# Patient Record
Sex: MALE | Race: WHITE | NOT HISPANIC OR LATINO | Employment: OTHER | URBAN - METROPOLITAN AREA
[De-identification: names, ages, dates, MRNs, and addresses within clinical notes are randomized per-mention and may not be internally consistent; named-entity substitution may affect disease eponyms.]

---

## 2018-01-12 ENCOUNTER — OFFICE VISIT CONVERTED (OUTPATIENT)
Dept: SURGERY | Facility: CLINIC | Age: 78
End: 2018-01-12
Attending: UROLOGY

## 2018-03-29 ENCOUNTER — OFFICE VISIT CONVERTED (OUTPATIENT)
Dept: CARDIOLOGY | Facility: CLINIC | Age: 78
End: 2018-03-29
Attending: INTERNAL MEDICINE

## 2018-10-30 ENCOUNTER — OFFICE VISIT CONVERTED (OUTPATIENT)
Dept: CARDIOLOGY | Facility: CLINIC | Age: 78
End: 2018-10-30
Attending: INTERNAL MEDICINE

## 2019-02-26 ENCOUNTER — OFFICE VISIT CONVERTED (OUTPATIENT)
Dept: SURGERY | Facility: CLINIC | Age: 79
End: 2019-02-26
Attending: UROLOGY

## 2019-06-07 ENCOUNTER — OFFICE VISIT CONVERTED (OUTPATIENT)
Dept: CARDIOLOGY | Facility: CLINIC | Age: 79
End: 2019-06-07
Attending: INTERNAL MEDICINE

## 2019-12-19 ENCOUNTER — CONVERSION ENCOUNTER (OUTPATIENT)
Dept: CARDIOLOGY | Facility: CLINIC | Age: 79
End: 2019-12-19

## 2019-12-19 ENCOUNTER — OFFICE VISIT CONVERTED (OUTPATIENT)
Dept: CARDIOLOGY | Facility: CLINIC | Age: 79
End: 2019-12-19
Attending: INTERNAL MEDICINE

## 2020-03-13 ENCOUNTER — CONVERSION ENCOUNTER (OUTPATIENT)
Dept: SURGERY | Facility: CLINIC | Age: 80
End: 2020-03-13

## 2020-03-13 ENCOUNTER — OFFICE VISIT CONVERTED (OUTPATIENT)
Dept: UROLOGY | Facility: CLINIC | Age: 80
End: 2020-03-13
Attending: UROLOGY

## 2020-07-23 ENCOUNTER — CONVERSION ENCOUNTER (OUTPATIENT)
Dept: CARDIOLOGY | Facility: CLINIC | Age: 80
End: 2020-07-23

## 2020-07-23 ENCOUNTER — OFFICE VISIT CONVERTED (OUTPATIENT)
Dept: CARDIOLOGY | Facility: CLINIC | Age: 80
End: 2020-07-23
Attending: INTERNAL MEDICINE

## 2020-07-31 ENCOUNTER — OFFICE VISIT CONVERTED (OUTPATIENT)
Dept: CARDIOLOGY | Facility: CLINIC | Age: 80
End: 2020-07-31
Attending: INTERNAL MEDICINE

## 2020-07-31 ENCOUNTER — HOSPITAL ENCOUNTER (OUTPATIENT)
Dept: CARDIOLOGY | Facility: HOSPITAL | Age: 80
Discharge: HOME | End: 2020-07-31
Attending: INTERNAL MEDICINE

## 2020-09-09 ENCOUNTER — OFFICE VISIT CONVERTED (OUTPATIENT)
Dept: UROLOGY | Facility: CLINIC | Age: 80
End: 2020-09-09
Attending: UROLOGY

## 2021-03-04 ENCOUNTER — CONVERSION ENCOUNTER (OUTPATIENT)
Dept: CARDIOLOGY | Facility: CLINIC | Age: 81
End: 2021-03-04

## 2021-03-04 ENCOUNTER — OFFICE VISIT CONVERTED (OUTPATIENT)
Dept: CARDIOLOGY | Facility: CLINIC | Age: 81
End: 2021-03-04
Attending: INTERNAL MEDICINE

## 2021-03-16 ENCOUNTER — CONVERSION ENCOUNTER (OUTPATIENT)
Dept: SURGERY | Facility: CLINIC | Age: 81
End: 2021-03-16

## 2021-03-16 ENCOUNTER — OFFICE VISIT CONVERTED (OUTPATIENT)
Dept: UROLOGY | Facility: CLINIC | Age: 81
End: 2021-03-16
Attending: UROLOGY

## 2021-03-16 ENCOUNTER — HOSPITAL ENCOUNTER (OUTPATIENT)
Dept: LAB | Facility: HOSPITAL | Age: 81
Discharge: HOME | End: 2021-03-16
Attending: INTERNAL MEDICINE

## 2021-03-16 LAB
BILIRUB UR QL STRIP: NEGATIVE
BNP SERPL-MCNC: 1281 PG/ML (ref 0–1800)
COLOR UR: YELLOW
CONV CLARITY OF URINE: CLEAR
CONV PROTEIN IN URINE BY AUTOMATED TEST STRIP: NEGATIVE
CONV UROBILINOGEN IN URINE BY AUTOMATED TEST STRIP: NORMAL
GLUCOSE UR QL: NEGATIVE
HGB UR QL STRIP: NEGATIVE
KETONES UR QL STRIP: NEGATIVE
LEUKOCYTE ESTERASE UR QL STRIP: NEGATIVE
NITRITE UR QL STRIP: NEGATIVE
PH UR STRIP.AUTO: 6 [PH]
SP GR UR: 1.03

## 2021-05-13 NOTE — PROGRESS NOTES
Progress Note      Patient Name: Justyn Galo   Patient ID: [id number]   Sex: Male   YOB: 1940    Primary Care Provider: Martin Monahan MD   Referring Provider: Saqib Ortega MD    Visit Date: July 31, 2020    Provider: Saqib Ortega MD   Location: [facility]   Location Address: [address] [place] [address], [facility]   [place]  [id number]   Location Phone: [phone]          Chief Complaint  · Right lower leg edema and pain.       History Of Present Illness  REFERRING CARE PROVIDER: Saqib Ortega MD   Justyn Galo is a 80 year old /White male with a history of paroxysmal atrial fibrillation, hypothyroidism, CVA, and sleep apnea who, after being seen last time, developed some pain in his right foot along with edema and swelling. He was seen in the emergency room and treated for cellulitis. The patient, prior to this, had not been having any ambulatory discomfort in the leg and started rather abruptly about a week ago. He denies fever, or chills.   PAST MEDICAL HISTORY: CVA; Paroxysmal atrial fibrillation; Hypothyroidism.   FAMILY HISTORY: Positive for hypertension and heart disease. Negative for diabetes mellitus.   PSYCHOSOCIAL HISTORY: Previously smoked, but quit. Rarely consumes alcohol. Admits mood changes and depression.   CURRENT MEDICATIONS: Metoprolol 50 mg b.i.d.; amiodarone 200 mg b.i.d.; Xarelto which was originally listed at 10 mg but in talking to them believe, he states, he was actually taking 20 mg daily; escitalopram 10 mg daily; tamsulosin 0.4 mg b.i.d.; levothyroxine 50 mcg daily; finasteride 5 mg daily; Centrum Silver daily; hydrocodone-APAP 5-325 mg 1-2 q. 6 h. p.r.n.; cephalexin 500 mg 1 capsules q. 6 h.; trimethoprim sulfamethoxazole 1 tablet q. 12 h. x10 days.       Review of Systems  · Cardiovascular  o Admits  o : palpitations (fast, fluttering, or skipping beats), swelling (feet, ankles, hands), shortness of breath while walking or lying  "flat  o Denies  o : chest pain or angina pectoris   · Respiratory  o Denies  o : chronic or frequent cough, asthma or wheezing      Vitals  Date Time BP Position Site L\R Cuff Size HR RR TEMP (F) WT  HT  BMI kg/m2 BSA m2 O2 Sat        07/31/2020 10:55 /68 Sitting    77 - R   189lbs 4oz 6'  2\" 24.3 2.12           Physical Examination  · Constitutional  o Appearance  o : Awake, alert, in no acute distress.   · Eyes  o Conjunctivae  o : Normal.  · Ears, Nose, Mouth and Throat  o Oral Cavity  o :   § Oral Mucosa  § : Normal.  · Neck  o Inspection/Palpation  o : No JVD. Good carotid upstroke. No thyromegaly.  · Respiratory  o Respiratory  o : Good respiratory effort. Clear to percussion and auscultation.  · Cardiovascular  o Heart  o :   § Auscultation of Heart  § : Irregularly irregular.  o Peripheral Vascular System  o :   § Extremities  § : Patient's right foot is erythematous with a somewhat clearly demarcated area over mid way up his foot. There are no areas of obvious skin breakdown. Not able to palpate his dorsalis pedis or posterior tibialis well on the right. On the left side, they were mildly able to be palpated, +1 bilaterally.   · Gastrointestinal  o Abdominal Examination  o : Soft. No tenderness or masses felt. No hepatosplenomegaly. Abdominal aorta is not palpable.  · Imaging  o Imaging  o : Foot X-ray from the 24th shows soft tissue changes around the 1st metatarsal phalangeal joint; no acute fracture; possibly inflammatory process.  · Labs  o Labs  o : WBC 7.34.          Assessment     ASSESSMENT & PLAN:    Right foot swelling and erythema.  Patient has symptomatically improved.  Pain is resolved.  He states swelling is improved, as well, although this is my first time visualizing the area.  Patient still has a few days left on his antibiotics, although there is no obvious area of skin breakdown from a cellulitis standpoint.  Given his clinical response, this does seem the possible underlying " issue.  Patient also does have mmbrnyyjn-jf-gurivme pulses, though, but no ongoing pain.  We will get a lower extremity ultrasound of his legs to evaluate for peripheral vascular disease, as well.  In the meantime, instructed him to make sure that he is taking 20 mg of Xarelto when he goes home and that he is taking it with a meal.        Saqib Ortega MD  JH:vm             Electronically Signed by: Candace Felix-, Other -Author on August 4, 2020 09:05:46 AM  Electronically Co-signed by: Saqib Ortega MD -Reviewer on August 10, 2020 02:10:59 PM

## 2021-05-13 NOTE — PROGRESS NOTES
Progress Note      Patient Name: Justyn Galo   Patient ID: [id number]   Sex: Male   YOB: 1940    Primary Care Provider: Martin Monahan MD    Visit Date: September 9, 2020    Provider: Lukas Bran MD   Location: [facility]   Location Address: [address] [place] [address]  [id number]   Location Phone: [phone]          Chief Complaint  · pt here for urologic issues      History Of Present Illness     78 yo male presents in follow up. He is seen for LUTS and incontience     Patient was started on finasteride and increased Flomax 0.8 mg at his last visit.    currently on Flomax 0.8 mg daily and  Finasteride 5 mg daily.  Has been on this greater than 1 year.  Not sure if this medication is helping or not.  ok stream.  intermittency of stream. Nocturia X  3-2 .  frequency.  With a slow stream and some hesitancy with starting and stopping.    Patient is been having a lot of trouble with falling, he fell 3 times last week.    No Gross hematuria.    Patient wearing 1 pad daily.  Patient having some urge incontinence at times.    Not sure if Flomax is causing any of his dizziness or not.    PVR     8/20  191   3/20  100    No history of kidney stone.    No urologic family history,   Has never had any urologic surgery.    h/o CVA.  Patient does not smoke.  On Xarelto for atrial fibrillation.                 Past Medical History  Atrial Fibrillation; Hypertension; Irregular Heart Beat; Stroke; Urinary frequency; Urinary incontinence         Past Surgical History  Cataract surgery; Leg Surgery         Medication List  escitalopram oxalate 10 mg oral tablet; finasteride 5 mg oral tablet; Flomax 0.4 mg oral capsule; levothyroxine 25 mcg oral tablet; metoprolol succinate 25 mg oral tablet extended release 24 hr; Toprol XL 25 mg oral tablet extended release 24 hr; Xarelto 20 mg oral tablet         Allergy List  Claritin       Allergies Reconciled  [facility]  "History  Cerebrovascular disease; Heart Attack (MI)         Social History  Tobacco (Former)         Review of Systems  · Constitutional  o Denies  o : chills, fever  · Gastrointestinal  o Denies  o : nausea, vomiting      Vitals  Date Time BP Position Site L\R Cuff Size HR RR TEMP (F) WT  HT  BMI kg/m2 BSA m2 O2 Sat HC       09/09/2020 02:47 PM       17  189lbs 0oz 6'  2\" 24.27 2.12           Physical Examination  · Constitutional  o Appearance  o : Well-appearing, well-developed, in no acute distress  · Respiratory  o Respiratory Effort  o : Unlabored breathing  · Cardiovascular  o Heart  o :   § Auscultation of Heart  § : Regular rate and rhythm, no murmurs  · Neurologic  o Mental Status Examination  o :   § Orientation  § : Grossly oriented to person, place and time, judgment and insight intact, normal mood and affect          Results  · In-Office Procedures  o Surgical procedure  § IOP - Bladder Scan/Residual Urine (00775)   § Specimen vol Ur: 191       Assessment  · Lower urinary tract symptoms     788.99/R39.9  · Benign prostatic hyperplasia with weak urinary stream       Benign prostatic hyperplasia with lower urinary tract symptoms     600.01/N40.1  Poor urinary stream     600.01/R39.12    Problems Reconciled  Plan  · Medications  o Medications have been Reconciled  o Transition of Care or Provider Policy  · Instructions  o Electronically Identified Patient Education Materials Provided Electronically       BPH    Patient having more trouble with falls recently.  After discussion I am not sure if it is from Flomax or not but we will take him back down to Flomax 0.4 mg daily.    Continue also finasteride 5 mg daily    Did discuss TURP risk and benefits today.  Because of patient's age and his health at this time we will hold off on any procedures.  He will let me know if he gets worse in the coming months      Follow-up in 6 months with [facility] before    Greater than 15 minutes was used in counseling and " coordination of care, with greater than 51% of this in face-to-face counseling             Electronically Signed by: Lukas Bran MD -Author on September 9, 2020 04:51:44 PM

## 2021-05-13 NOTE — PROGRESS NOTES
Progress Note      Patient Name: Justyn Galo   Patient ID: [id number]   Sex: Male   YOB: 1940    Primary Care Provider: Martin Monahan MD   Referring Provider: Saqib Ortega MD    Visit Date: July 23, 2020    Provider: Saqib Ortega MD   Location: [facility]   Location Address: [address] [place] [address], [facility]   [place]  [id number]   Location Phone: [phone]          Chief Complaint  · Tachycardia   · Not feeling well      History Of Present Illness  REFERRING CARE PROVIDER: Martin Monahan MD   Justyn Galo is a 80 year old /White gentleman with a previous history of paroxysmal atrial fibrillation, hypothyroidism, CVA and sleep apnea, who has had issues with elevated heart rate recently, dizziness especially with position changes and more episodes of falling. He reports increased shortness of breath and just not feeling well in general. He denies any chest pain problems.   PAST MEDICAL HISTORY: Paroxysmal atrial fibrillation; previous cerebrovascular accident; hypothyroidism.   FAMILY HISTORY: Positive for hypertension and heart disease. Negative for diabetes.   PSYCHOSOCIAL HISTORY: Positive for mood changes and depression. He rarely drinks alcohol. He previously used tobacco but quit.   CURRENT MEDICATIONS: include Metoprolol 50 mg b.i.d.; Xarelto 20 mg daily; Escitalopram; Tamsulosin; Synthroid 50 mcg daily; Finasteride 5 mg daily; Centrum Silver. The dosage and frequency of the medications were reviewed with the patient.       Review of Systems  · Cardiovascular  o Admits  o : palpitations (fast, fluttering, or skipping beats), swelling (feet, ankles, hands), shortness of breath while walking or lying flat  o Denies  o : chest pain or angina pectoris   · Respiratory  o Denies  o : chronic or frequent cough, asthma or wheezing      Vitals  Date Time BP Position Site L\R Cuff Size HR RR TEMP (F) WT  HT  BMI kg/m2 BSA m2 O2 Sat HC       07/23/2020 01:20 PM  "144/100 Sitting    136 - R   195lbs 16oz 6'  2\" 25.16 2.15     07/23/2020 01:20 /96 Sitting    120 - R                 Physical Examination  · Constitutional  o Appearance  o : Awake, alert, in no acute distress.   · Eyes  o Conjunctivae  o : Normal.  · Ears, Nose, Mouth and Throat  o Oral Cavity  o :   § Oral Mucosa  § : Normal.  · Neck  o Inspection/Palpation  o : No JVD. Good carotid upstroke. No thyromegaly.  · Respiratory  o Respiratory  o : Good respiratory effort. Clear to percussion and auscultation.  · Cardiovascular  o Heart  o :   § Auscultation of Heart  § : S1, S2 normal. Irregularly irregular without murmurs, gallops, or rubs.  o Peripheral Vascular System  o :   § Extremities  § : Good femoral and pedal pulses. +1 lower-extremity edema.  · Gastrointestinal  o Abdominal Examination  o : Soft. No tenderness or masses felt. No hepatosplenomegaly. Abdominal aorta is not palpable.          Assessment     ASSESSMENT AND PLAN:    1.  Atrial fibrillation, persistent now in nature and elevated rate:  Discussed with him the possibility of a        cardioversion.  He wished to try chemically converting first.  Will start him on Amlodipine 200 mg b.i.d. and       have him follow back up next week to see if he has returned back to normal sinus rhythm or not.  Patient is       on Xarelto for CVA prevention.  2.  CVA.    MD Sandra Ji/kendrick         This note was transcribed by Kim Maxwell.  kendrick/sandra   The above service was transcribed by Kim Maxwell, and I attest to the accuracy of the note.  SANDRA.               Electronically Signed by: Kim Maxwell-, -Author on July 27, 2020 10:16:30 AM  Electronically Co-signed by: Saqib Ortega MD -Reviewer on July 27, 2020 10:21:03 AM  "

## 2021-05-14 VITALS — BODY MASS INDEX: 26.37 KG/M2 | RESPIRATION RATE: 13 BRPM | WEIGHT: 205.5 LBS | HEIGHT: 74 IN

## 2021-05-14 VITALS — BODY MASS INDEX: 24.26 KG/M2 | WEIGHT: 189 LBS | HEIGHT: 74 IN | RESPIRATION RATE: 17 BRPM

## 2021-05-14 VITALS
SYSTOLIC BLOOD PRESSURE: 106 MMHG | HEIGHT: 74 IN | WEIGHT: 208 LBS | HEART RATE: 98 BPM | BODY MASS INDEX: 26.69 KG/M2 | DIASTOLIC BLOOD PRESSURE: 70 MMHG

## 2021-05-14 NOTE — PROGRESS NOTES
Progress Note      Patient Name: Justyn Galo   Patient ID: [id number]   Sex: Male   YOB: 1940    Primary Care Provider: Martin Monahan MD    Visit Date: March 16, 2021    Provider: Lukas Bran MD   Location: [facility]   Location Address: [address] [place] [address]  [id number]   Location Phone: [phone]          Chief Complaint  · urological issues      History Of Present Illness     79 yo male presents in follow up. He is seen for LUTS and incontinence     Patient is being worked up by cardiology for shortness of air currently.     on finasteride and Flomax 0.4 mg at his last visit.  Was on 0.8 but had to drop back down as we are little worried this is causing him to have some falls.    Patient can have to wait for initiation of stream several minutes sometimes.  No Straining.  Ok stream.  intermittency of stream. Nocturia X  3-2 .  frequency.   slow stream and some hesitancy with starting and stopping. 1 pad a day.  About the same.    Incontinence is insensate.    Had trouble with falls back last year has been doing better.    No Gross hematuria/UTI    PVR     3/21  108  8/20  191   3/20  100    No history of kidney stone.    No urologic family history,   Has never had any urologic surgery.    No CAD. h/o CVA.  Patient does not smoke.  On Xarelto for atrial fibrillation.                 Past Medical History  Atrial Fibrillation; Hypertension; Irregular Heart Beat; Stroke; Urinary Frequency; Urinary incontinence         Past Surgical History  Cataract surgery; Leg Surgery         Medication List  escitalopram oxalate 10 mg oral tablet; finasteride 5 mg oral tablet; Flomax 0.4 mg oral capsule; levothyroxine 25 mcg oral tablet; metoprolol succinate 25 mg oral tablet extended release 24 hr; Toprol XL 25 mg oral tablet extended release 24 hr; Xarelto 20 mg oral tablet         Allergy List  Claritin       Allergies Reconciled  Family Medical History  Cerebrovascular  "disease; Heart Attack (MI)         Social History  Alcohol (Current some day); Tobacco (Former)         Review of Systems  · Constitutional  o Denies  o : fatigue, night sweats  · Eyes  o Denies  o : double vision, blurred vision  · HENT  o Denies  o : vertigo, recent head injury  · Breasts  o Denies  o : abnormal changes in breast size, additional breast symptoms except as noted in the HPI  · Cardiovascular  o Denies  o : chest pain, irregular heart beats  · Respiratory  o Denies  o : shortness of breath, productive cough  · Gastrointestinal  o Denies  o : nausea, vomiting  · Genitourinary  o Admits  o : urgency, nocturia, incontinence, difficulty voiding, decreased stream  o Denies  o : dysuria, urinary retention  · Integument  o Denies  o : hair growth change, new skin lesions  · Neurologic  o Denies  o : altered mental status, seizures  · Musculoskeletal  o Denies  o : joint swelling, limitation of motion  · Endocrine  o Denies  o : cold intolerance, heat intolerance  · Heme-Lymph  o Denies  o : petechiae, lymph node enlargement or tenderness  · Allergic-Immunologic  o Denies  o : frequent illnesses      Vitals  Date Time BP Position Site L\R Cuff Size HR RR TEMP (F) WT  HT  BMI kg/m2 BSA m2 O2 Sat FR L/min FiO2        03/16/2021 10:54 AM       13  205lbs 8oz 6'  2\" 26.38 2.21             Physical Examination  · Constitutional  o Appearance  o : Well-appearing, well-developed, in no acute distress  · Respiratory  o Respiratory Effort  o : Unlabored breathing  · Neurologic  o Mental Status Examination  o :   § Orientation  § : Grossly oriented to person, place and time, judgment and insight intact, normal mood and affect          Results  · In-Office Procedures  o Surgical procedure  § IOP - Bladder Scan/Residual Urine (78301)   § Specimen vol Ur: 108   o Lab procedure  § Automated Dipstick Urinalysis (Surg Spec) WITHOUT Micro HMH (81782)   § Color Ur: Yellow   § Clarity Ur: Clear   § Glucose Ur Ql Strip: " Negative   § Bilirub Ur Ql Strip: Negative   § Ketones Ur Ql Strip: Negative   § Sp Gr Ur Qn: 1.030   § Hgb Ur Ql Strip: Negative   § pH Ur-LsCnc: 6.0   § Prot Ur Ql Strip: Negative   § Urobilinogen Ur Strip-mCnc: 0.2 E.U./dL   § Nitrite Ur Ql Strip: Negative   § WBC Est Ur Ql Strip: Negative       Assessment  · Lower urinary tract symptoms     788.99/R39.9  · Benign prostatic hyperplasia with weak urinary stream       Benign prostatic hyperplasia with lower urinary tract symptoms     600.01/N40.1  Poor urinary stream     600.01/R39.12      Plan  · Medications  o Medications have been Reconciled  o Transition of Care or Provider Policy  · Instructions  o Electronically Identified Patient Education Materials Provided Electronically       BPH    Cont Flomax 0.4 and finasteride 5 daily.  Refilled today.    Patient having trouble with shortness of air and other issues with his medical comorbidities.  I did discuss at this time because he has several things going on I think staying on medication is most reasonable option unless he starts getting worse for BPH symptoms we will continue meds.    Follow-up in 1 year with [facility]                 Electronically Signed by: Lukas Bran MD -Author on March 16, 2021 11:27:08 AM

## 2021-05-14 NOTE — PROGRESS NOTES
"   Progress Note      Patient Name: Justyn Galo   Patient ID: [id number]   Sex: Male   YOB: 1940    Primary Care Provider: Martin Monahan MD    Visit Date: March 4, 2021    Provider: Saqib Ortega MD   Location: [facility]   Location Address: [address] [place] [address], [facility]   [place], [place]  [id number]   Location Phone: [phone]          Chief Complaint     Atrial fibrillation.       History Of Present Illness  REFERRING CARE PROVIDER: REFERRING CARE PROVIDER NAME   Justyn Galo is a 80 year old /White male with paroxysmal atrial fibrillation, hyperthyroidism, CVA, and sleep apnea who has been having some dizziness and fall spells. The dizziness seems to be somewhat positional and worsening with head turns. The patient does report some PND symptoms as well and shortness of breath at night.   PAST MEDICAL HISTORY: CVA; Paroxysmal atrial fibrillation; Hypothyroidism.   PSYCHOSOCIAL HISTORY: Rarely uses alcohol. Previous use of tobacco, but quit.   CURRENT MEDICATIONS: Medication list was reviewed and is as documented.      ALLERGIES: No known drug allergies.       Review of Systems  · Cardiovascular  o Admits  o : swelling (feet, ankles, hands), shortness of breath while walking or lying flat  o Denies  o : palpitations (fast, fluttering, or skipping beats), chest pain or angina pectoris   · Respiratory  o Denies  o : chronic or frequent cough      Vitals  Date Time BP Position Site L\R Cuff Size HR RR TEMP (F) WT  HT  BMI kg/m2 BSA m2 O2 Sat FR L/min FiO2 HC       03/04/2021 01:05 /70 Sitting    98 - R   208lbs 0oz 6'  2\" 26.71 2.22             Physical Examination  · Constitutional  o Appearance  o : Awake, alert, in no acute distress.   · Eyes  o Conjunctivae  o : Normal.  · Ears, Nose, Mouth and Throat  o Oral Cavity  o :   § Oral Mucosa  § : Normal.  · Neck  o Inspection/Palpation  o : No JVD. Good carotid upstroke. No thyromegaly.  · Respiratory  o Respiratory  o : Good " respiratory effort. Clear to percussion and auscultation.  · Cardiovascular  o Heart  o :   § Auscultation of Heart  § : S1, S2 normal. Regular rate and rhythm without murmurs, gallops, or rubs.  o Peripheral Vascular System  o :   § Extremities  § : Good femoral and pedal pulses. Lower extremity edema on the left plus 1, trace on the right.  · Gastrointestinal  o Abdominal Examination  o : Soft. No tenderness or masses felt. No hepatosplenomegaly. Abdominal aorta is not palpable.          Assessment     1.  Paroxysmal atrial fibrillation. Symptomatic, remains in normal sinus rhythm. Xarelto for CVA prevention.  2.  Dizziness. Symptoms have resolved. Since decreasing tamsulosin, feel they may have been orthostatic.  3.  Shortness of breath and some PND symptoms. We will check a ProBNP level.        Saqib Ortega MD  /               Electronically Signed by: Monique Marie-, OT -Author on March 10, 2021 11:52:11 AM  Electronically Co-signed by: Saqib Ortega MD -Reviewer on March 18, 2021 04:36:26 PM

## 2021-05-15 VITALS
HEIGHT: 74 IN | BODY MASS INDEX: 25.93 KG/M2 | HEART RATE: 120 BPM | WEIGHT: 202 LBS | DIASTOLIC BLOOD PRESSURE: 118 MMHG | SYSTOLIC BLOOD PRESSURE: 150 MMHG

## 2021-05-15 VITALS
BODY MASS INDEX: 25.67 KG/M2 | SYSTOLIC BLOOD PRESSURE: 130 MMHG | HEIGHT: 74 IN | WEIGHT: 200 LBS | HEART RATE: 96 BPM | DIASTOLIC BLOOD PRESSURE: 88 MMHG

## 2021-05-15 VITALS — HEIGHT: 74 IN | BODY MASS INDEX: 26.31 KG/M2 | RESPIRATION RATE: 16 BRPM | WEIGHT: 205 LBS

## 2021-05-15 VITALS
SYSTOLIC BLOOD PRESSURE: 144 MMHG | BODY MASS INDEX: 25.15 KG/M2 | HEIGHT: 74 IN | WEIGHT: 196 LBS | DIASTOLIC BLOOD PRESSURE: 100 MMHG | HEART RATE: 136 BPM

## 2021-05-15 VITALS
HEIGHT: 74 IN | BODY MASS INDEX: 24.29 KG/M2 | WEIGHT: 189.25 LBS | DIASTOLIC BLOOD PRESSURE: 68 MMHG | HEART RATE: 77 BPM | SYSTOLIC BLOOD PRESSURE: 105 MMHG

## 2021-05-16 VITALS — WEIGHT: 201 LBS | HEIGHT: 74 IN | BODY MASS INDEX: 25.8 KG/M2 | RESPIRATION RATE: 12 BRPM

## 2021-05-16 VITALS
WEIGHT: 191 LBS | HEART RATE: 106 BPM | SYSTOLIC BLOOD PRESSURE: 112 MMHG | DIASTOLIC BLOOD PRESSURE: 78 MMHG | BODY MASS INDEX: 24.51 KG/M2 | HEIGHT: 74 IN

## 2021-05-16 VITALS
DIASTOLIC BLOOD PRESSURE: 82 MMHG | BODY MASS INDEX: 25.28 KG/M2 | SYSTOLIC BLOOD PRESSURE: 130 MMHG | WEIGHT: 197 LBS | HEIGHT: 74 IN | HEART RATE: 60 BPM

## 2021-05-16 VITALS — BODY MASS INDEX: 25.03 KG/M2 | WEIGHT: 195 LBS | RESPIRATION RATE: 16 BRPM | HEIGHT: 74 IN

## 2021-09-30 ENCOUNTER — APPOINTMENT (OUTPATIENT)
Dept: GENERAL RADIOLOGY | Facility: HOSPITAL | Age: 81
End: 2021-09-30

## 2021-09-30 ENCOUNTER — APPOINTMENT (OUTPATIENT)
Dept: CT IMAGING | Facility: HOSPITAL | Age: 81
End: 2021-09-30

## 2021-09-30 ENCOUNTER — HOSPITAL ENCOUNTER (EMERGENCY)
Facility: HOSPITAL | Age: 81
Discharge: HOME | End: 2021-09-30
Attending: EMERGENCY MEDICINE | Admitting: EMERGENCY MEDICINE

## 2021-09-30 VITALS
HEIGHT: 74 IN | WEIGHT: 205 LBS | RESPIRATION RATE: 18 BRPM | HEART RATE: 86 BPM | BODY MASS INDEX: 26.31 KG/M2 | OXYGEN SATURATION: 94 % | TEMPERATURE: 98.5 F | SYSTOLIC BLOOD PRESSURE: 132 MMHG | DIASTOLIC BLOOD PRESSURE: 98 MMHG
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
## 2021-09-30 DIAGNOSIS — S00.83XA CONTUSION OF FACE, INITIAL ENCOUNTER: ICD-10-CM

## 2021-09-30 LAB
ALBUMIN SERPL-MCNC: 4 G/DL (ref 3.5–5.2)
ALBUMIN/GLOB SERPL: 1.4 G/DL
ALP SERPL-CCNC: 116 U/L (ref 39–117)
ALT SERPL W P-5'-P-CCNC: 13 U/L (ref 1–41)
ANION GAP SERPL CALCULATED.3IONS-SCNC: 10.3 MMOL/L (ref 5–15)
ANISOCYTOSIS BLD QL: NORMAL
AST SERPL-CCNC: 23 U/L (ref 1–40)
BASOPHILS # BLD AUTO: 0.03 10*3/MM3 (ref 0–0.2)
BASOPHILS NFR BLD AUTO: 0.4 % (ref 0–1.5)
BILIRUB SERPL-MCNC: 1.2 MG/DL (ref 0–1.2)
BILIRUB UR QL STRIP: NEGATIVE
BUN SERPL-MCNC: 14 MG/DL (ref 8–23)
BUN/CREAT SERPL: 13.5 (ref 7–25)
CALCIUM SPEC-SCNC: 9.3 MG/DL (ref 8.6–10.5)
CHLORIDE SERPL-SCNC: 103 MMOL/L (ref 98–107)
CLARITY UR: CLEAR
CO2 SERPL-SCNC: 24.7 MMOL/L (ref 22–29)
COLOR UR: YELLOW
CREAT SERPL-MCNC: 1.04 MG/DL (ref 0.76–1.27)
DEPRECATED RDW RBC AUTO: 49.9 FL (ref 37–54)
EOSINOPHIL # BLD AUTO: 0.3 10*3/MM3 (ref 0–0.4)
EOSINOPHIL NFR BLD AUTO: 3.9 % (ref 0.3–6.2)
ERYTHROCYTE [DISTWIDTH] IN BLOOD BY AUTOMATED COUNT: 13.1 % (ref 12.3–15.4)
GFR SERPL CREATININE-BSD FRML MDRD: 69 ML/MIN/1.73
GLOBULIN UR ELPH-MCNC: 2.9 GM/DL
GLUCOSE SERPL-MCNC: 104 MG/DL (ref 65–99)
GLUCOSE UR STRIP-MCNC: NEGATIVE MG/DL
HCT VFR BLD AUTO: 34.9 % (ref 37.5–51)
HGB BLD-MCNC: 11.6 G/DL (ref 13–17.7)
HGB UR QL STRIP.AUTO: NEGATIVE
HOLD SPECIMEN: NORMAL
HOLD SPECIMEN: NORMAL
IMM GRANULOCYTES # BLD AUTO: 0.02 10*3/MM3 (ref 0–0.05)
IMM GRANULOCYTES NFR BLD AUTO: 0.3 % (ref 0–0.5)
KETONES UR QL STRIP: NEGATIVE
LEUKOCYTE ESTERASE UR QL STRIP.AUTO: NEGATIVE
LYMPHOCYTES # BLD AUTO: 1.58 10*3/MM3 (ref 0.7–3.1)
LYMPHOCYTES NFR BLD AUTO: 20.4 % (ref 19.6–45.3)
MACROCYTES BLD QL SMEAR: NORMAL
MAGNESIUM SERPL-MCNC: 1.9 MG/DL (ref 1.6–2.4)
MCH RBC QN AUTO: 34.7 PG (ref 26.6–33)
MCHC RBC AUTO-ENTMCNC: 33.2 G/DL (ref 31.5–35.7)
MCV RBC AUTO: 104.5 FL (ref 79–97)
MONOCYTES # BLD AUTO: 0.65 10*3/MM3 (ref 0.1–0.9)
MONOCYTES NFR BLD AUTO: 8.4 % (ref 5–12)
NEUTROPHILS NFR BLD AUTO: 5.18 10*3/MM3 (ref 1.7–7)
NEUTROPHILS NFR BLD AUTO: 66.6 % (ref 42.7–76)
NITRITE UR QL STRIP: NEGATIVE
NRBC BLD AUTO-RTO: 0 /100 WBC (ref 0–0.2)
PH UR STRIP.AUTO: 6 [PH] (ref 5–8)
PLATELET # BLD AUTO: 126 10*3/MM3 (ref 140–450)
PMV BLD AUTO: 10.5 FL (ref 6–12)
POTASSIUM SERPL-SCNC: 4.1 MMOL/L (ref 3.5–5.2)
PROT SERPL-MCNC: 6.9 G/DL (ref 6–8.5)
PROT UR QL STRIP: NEGATIVE
QT INTERVAL: 347 MS
RBC # BLD AUTO: 3.34 10*6/MM3 (ref 4.14–5.8)
SMALL PLATELETS BLD QL SMEAR: NORMAL
SODIUM SERPL-SCNC: 138 MMOL/L (ref 136–145)
SP GR UR STRIP: 1.01 (ref 1–1.03)
TROPONIN T SERPL-MCNC: <0.01 NG/ML (ref 0–0.03)
UROBILINOGEN UR QL STRIP: NORMAL
WBC # BLD AUTO: 7.76 10*3/MM3 (ref 3.4–10.8)
WBC MORPH BLD: NORMAL
WHOLE BLOOD HOLD SPECIMEN: NORMAL
WHOLE BLOOD HOLD SPECIMEN: NORMAL

## 2021-09-30 PROCEDURE — 93005 ELECTROCARDIOGRAM TRACING: CPT

## 2021-09-30 PROCEDURE — 99284 EMERGENCY DEPT VISIT MOD MDM: CPT

## 2021-09-30 PROCEDURE — 70450 CT HEAD/BRAIN W/O DYE: CPT

## 2021-09-30 PROCEDURE — 85025 COMPLETE CBC W/AUTO DIFF WBC: CPT

## 2021-09-30 PROCEDURE — 70486 CT MAXILLOFACIAL W/O DYE: CPT

## 2021-09-30 PROCEDURE — 71045 X-RAY EXAM CHEST 1 VIEW: CPT

## 2021-09-30 PROCEDURE — 83735 ASSAY OF MAGNESIUM: CPT

## 2021-09-30 PROCEDURE — 80053 COMPREHEN METABOLIC PANEL: CPT

## 2021-09-30 PROCEDURE — 93010 ELECTROCARDIOGRAM REPORT: CPT | Performed by: INTERNAL MEDICINE

## 2021-09-30 PROCEDURE — 93005 ELECTROCARDIOGRAM TRACING: CPT | Performed by: EMERGENCY MEDICINE

## 2021-09-30 PROCEDURE — 85007 BL SMEAR W/DIFF WBC COUNT: CPT

## 2021-09-30 PROCEDURE — 81003 URINALYSIS AUTO W/O SCOPE: CPT | Performed by: EMERGENCY MEDICINE

## 2021-09-30 PROCEDURE — 84484 ASSAY OF TROPONIN QUANT: CPT

## 2021-09-30 RX ORDER — SODIUM CHLORIDE 0.9 % (FLUSH) 0.9 %
10 SYRINGE (ML) INJECTION AS NEEDED
Status: DISCONTINUED | OUTPATIENT
Start: 2021-09-30 | End: 2021-09-30 | Stop reason: HOSPADM

## 2021-10-14 ENCOUNTER — OFFICE VISIT (OUTPATIENT)
Dept: CARDIOLOGY | Facility: CLINIC | Age: 81
End: 2021-10-14

## 2021-10-14 VITALS
SYSTOLIC BLOOD PRESSURE: 147 MMHG | BODY MASS INDEX: 26.44 KG/M2 | WEIGHT: 206 LBS | HEIGHT: 74 IN | HEART RATE: 95 BPM | DIASTOLIC BLOOD PRESSURE: 95 MMHG

## 2021-10-14 DIAGNOSIS — I10 ESSENTIAL HYPERTENSION: ICD-10-CM

## 2021-10-14 DIAGNOSIS — I48.0 PAROXYSMAL ATRIAL FIBRILLATION (HCC): Primary | ICD-10-CM

## 2021-10-14 DIAGNOSIS — R55 SYNCOPE AND COLLAPSE: ICD-10-CM

## 2021-10-14 PROBLEM — Z86.73 HISTORY OF CVA (CEREBROVASCULAR ACCIDENT): Status: ACTIVE | Noted: 2021-10-14

## 2021-10-14 PROBLEM — E03.9 HYPOTHYROIDISM: Status: ACTIVE | Noted: 2021-10-14

## 2021-10-14 PROCEDURE — 93000 ELECTROCARDIOGRAM COMPLETE: CPT | Performed by: INTERNAL MEDICINE

## 2021-10-14 PROCEDURE — 99214 OFFICE O/P EST MOD 30 MIN: CPT | Performed by: NURSE PRACTITIONER

## 2021-10-14 RX ORDER — METOPROLOL SUCCINATE 100 MG/1
100 TABLET, EXTENDED RELEASE ORAL 2 TIMES DAILY
Qty: 180 TABLET | Refills: 3 | Status: SHIPPED | OUTPATIENT
Start: 2021-10-14 | End: 2022-07-20

## 2021-10-14 RX ORDER — ACETAMINOPHEN 500 MG
500 TABLET ORAL EVERY 6 HOURS PRN
COMMUNITY

## 2021-10-14 NOTE — PROGRESS NOTES
"Chief Complaint  Atrial Fibrillation and Hypertension    Subjective            History of Present Illness  Justyn Galo is a 81-year-old white/ male patient who presents to the office today with complaint of syncope and collapse.  His wife is with him to help provide information.  The wife reports three episodes in the last month where the patient has become dizzy and falling.  She gives examples like \"got up from kitchen chair and fell\", \"just walking and was assisted to sitting position\", and \"taking out garbage and fell due to dizziness.\"  They deny any changes in medications.  He reports compliance with all of his medications.  He reports when he checks his blood pressure at home it is \"normal\".  He denies any chest pain, shortness of breath, or palpitations.    PMH  Past Medical History:   Diagnosis Date   • BPH (benign prostatic hyperplasia)    • Disease of thyroid gland    • Essential hypertension 10/14/2021   • History of CVA (cerebrovascular accident)    • Paroxysmal atrial fibrillation 10/14/2021         ALLERGY  Allergies   Allergen Reactions   • Loratadine Other (See Comments)     Nose bleeds          SURGICALHX  Past Surgical History:   Procedure Laterality Date   • LEG SURGERY Right           SOC  Social History     Socioeconomic History   • Marital status:    Tobacco Use   • Smoking status: Former Smoker     Quit date: 10/14/1961     Years since quittin.0   • Smokeless tobacco: Never Used   Vaping Use   • Vaping Use: Never used   Substance and Sexual Activity   • Alcohol use: Never   • Drug use: Never   • Sexual activity: Defer         FAMHX  History reviewed. No pertinent family history.       MEDSIGONLY  Current Outpatient Medications on File Prior to Visit   Medication Sig   • acetaminophen (TYLENOL) 500 MG tablet Take 500 mg by mouth Every 6 (Six) Hours As Needed for Mild Pain .   • Cholecalciferol 125 MCG (5000 UT) tablet    • escitalopram (LEXAPRO) 20 MG tablet    • " "ezetimibe (ZETIA) 10 MG tablet    • finasteride (PROSCAR) 5 MG tablet finasteride 5 mg oral tablet take 1 tablet (5 mg) by oral route once daily for 90 days 3/16/2021  Active   • levothyroxine (SYNTHROID, LEVOTHROID) 50 MCG tablet    • metoprolol succinate XL (TOPROL-XL) 50 MG 24 hr tablet Take 50 mg by mouth 2 (two) times a day.   • multivitamin with minerals (MULTIVITAMIN ADULT PO) Take 1 tablet by mouth Daily.   • rivaroxaban (Xarelto) 20 MG tablet 20 mg.   • tamsulosin (Flomax) 0.4 MG capsule 24 hr capsule Flomax 0.4 mg oral capsule take 1 capsule (0.4 mg) by oral route once daily 1/2 hour following the same meal each day for 90 days 3/16/2021  Active     No current facility-administered medications on file prior to visit.         Objective   /95   Pulse 95   Ht 188 cm (74\")   Wt 93.4 kg (206 lb)   BMI 26.45 kg/m²       Physical Exam  HENT:      Head: Normocephalic.   Cardiovascular:      Rate and Rhythm: Normal rate. Rhythm irregular.      Pulses: Normal pulses.      Heart sounds: Normal heart sounds.   Pulmonary:      Effort: Pulmonary effort is normal.      Breath sounds: Normal breath sounds.   Musculoskeletal:      Cervical back: Neck supple.      Right lower le+ Edema present.      Left lower le+ Pitting Edema present.   Skin:     General: Skin is dry.      Capillary Refill: Capillary refill takes less than 2 seconds.   Neurological:      Mental Status: He is alert and oriented to person, place, and time. Mental status is at baseline.   Psychiatric:         Mood and Affect: Mood normal.         Behavior: Behavior normal.       ECG 12 Lead    Date/Time: 10/14/2021 9:55 AM  Performed by: Peggy Ordonez APRN  Authorized by: Saqib Ortega MD   Comparison: compared with previous ECG   Comparison to previous ECG: Now showing atrial fibrillation (not new), was in sinus rhythm previously  Rhythm: atrial fibrillation  Rate: normal  BPM: 94  ST Segments: ST segments normal  T Waves: T waves " normal    Clinical impression: abnormal EKG          Result Review :   The following data was reviewed by: KAYLEY Barksdale on 10/14/2021:  No results found for: PROBNP  CMP    CMP 9/30/21   Glucose 104 (A)   BUN 14   Creatinine 1.04   eGFR Non African Am 69   Sodium 138   Potassium 4.1   Chloride 103   Calcium 9.3   Albumin 4.00   Total Bilirubin 1.2   Alkaline Phosphatase 116   AST (SGOT) 23   ALT (SGPT) 13   (A) Abnormal value            CBC w/diff    CBC w/Diff 9/30/21   WBC 7.76   RBC 3.34 (A)   Hemoglobin 11.6 (A)   Hematocrit 34.9 (A)   .5 (A)   MCH 34.7 (A)   MCHC 33.2   RDW 13.1   Platelets 126 (A)   Neutrophil Rel % 66.6   Immature Granulocyte Rel % 0.3   Lymphocyte Rel % 20.4   Monocyte Rel % 8.4   Eosinophil Rel % 3.9   Basophil Rel % 0.4   (A) Abnormal value             No results found for: TSH   No results found for: FREET4   No results found for: DDIMERQUANT  Magnesium   Date Value Ref Range Status   09/30/2021 1.9 1.6 - 2.4 mg/dL Final      No results found for: DIGOXIN   Lab Results   Component Value Date    TROPONINT <0.010 09/30/2021           LIPID PANEL   2021-04-21     CHOLESTEROL, TOTAL 197   <200   HDL CHOLESTEROL 40   > OR = 40   TRIGLYCERIDES 201   <150   LDL-CHOLESTEROL 125       CHOL/HDLC RATIO 4.9   <5.0   NON HDL CHOLESTEROL 157   <130            Assessment and Plan    Diagnoses and all orders for this visit:    1. Paroxysmal atrial fibrillation (Primary)  Currently somewhat rate controlled with EKG in office showing atrial fibrillation with rate of 94 bpm.  Increase metoprolol dose from 50 mg daily to 100 mg daily.  Continue Xarelto for CVA prevention.    2. Essential hypertension  Elevated in office today, check blood pressure twice a day for the next two weeks, blood pressure log provided for patient.  Will review log once available to me and will make any necessary medication adjustments at that time.    3. Syncope and collapse  Since this is a recurring issue will  obtain 24-hour Holter monitor to evaluate for any tachycardia arrhythmia, carotid ultrasound to evaluate for any stenosis, and echocardiogram since he does have bilateral lower extremity on exam to evaluate left ventricular systolic function.  -     Holter Monitor - 24 Hour; Future  -     Duplex Carotid Ultrasound CAR; Future  -     Adult Transthoracic Echo Complete W/ Cont if Necessary Per Protocol; Future    Other orders  -     metoprolol succinate XL (TOPROL-XL) 100 MG 24 hr tablet; Take 1 tablet by mouth 2 (two) times a day.  Dispense: 180 tablet; Refill: 3  -     ECG 12 Lead            Follow Up   No follow-ups on file.    Patient was given instructions and counseling regarding his condition or for health maintenance advice. Please see specific information pulled into the AVS if appropriate.     Justyn Galo  reports that he quit smoking about 60 years ago. He has never used smokeless tobacco.         Peggy Ordonez, APRN  10/14/21  09:58 EDT    Dictated Utilizing Dragon Dictation

## 2021-10-14 NOTE — PATIENT INSTRUCTIONS
"Low-Sodium Eating Plan  Sodium, which is an element that makes up salt, helps you maintain a healthy balance of fluids in your body. Too much sodium can increase your blood pressure and cause fluid and waste to be held in your body.  Your health care provider or dietitian may recommend following this plan if you have high blood pressure (hypertension), kidney disease, liver disease, or heart failure. Eating less sodium can help lower your blood pressure, reduce swelling, and protect your heart, liver, and kidneys.  What are tips for following this plan?  Reading food labels  · The Nutrition Facts label lists the amount of sodium in one serving of the food. If you eat more than one serving, you must multiply the listed amount of sodium by the number of servings.  · Choose foods with less than 140 mg of sodium per serving.  · Avoid foods with 300 mg of sodium or more per serving.  Shopping    · Look for lower-sodium products, often labeled as \"low-sodium\" or \"no salt added.\"  · Always check the sodium content, even if foods are labeled as \"unsalted\" or \"no salt added.\"  · Buy fresh foods.  ? Avoid canned foods and pre-made or frozen meals.  ? Avoid canned, cured, or processed meats.  · Buy breads that have less than 80 mg of sodium per slice.    Cooking    · Eat more home-cooked food and less restaurant, buffet, and fast food.  · Avoid adding salt when cooking. Use salt-free seasonings or herbs instead of table salt or sea salt. Check with your health care provider or pharmacist before using salt substitutes.  · Cook with plant-based oils, such as canola, sunflower, or olive oil.    Meal planning  · When eating at a restaurant, ask that your food be prepared with less salt or no salt, if possible. Avoid dishes labeled as brined, pickled, cured, smoked, or made with soy sauce, miso, or teriyaki sauce.  · Avoid foods that contain MSG (monosodium glutamate). MSG is sometimes added to Chinese food, bouillon, and some " "canned foods.  · Make meals that can be grilled, baked, poached, roasted, or steamed. These are generally made with less sodium.  General information  Most people on this plan should limit their sodium intake to 1,500-2,000 mg (milligrams) of sodium each day.  What foods should I eat?  Fruits  Fresh, frozen, or canned fruit. Fruit juice.  Vegetables  Fresh or frozen vegetables. \"No salt added\" canned vegetables. \"No salt added\" tomato sauce and paste. Low-sodium or reduced-sodium tomato and vegetable juice.  Grains  Low-sodium cereals, including oats, puffed wheat and rice, and shredded wheat. Low-sodium crackers. Unsalted rice. Unsalted pasta. Low-sodium bread. Whole-grain breads and whole-grain pasta.  Meats and other proteins  Fresh or frozen (no salt added) meat, poultry, seafood, and fish. Low-sodium canned tuna and salmon. Unsalted nuts. Dried peas, beans, and lentils without added salt. Unsalted canned beans. Eggs. Unsalted nut butters.  Dairy  Milk. Soy milk. Cheese that is naturally low in sodium, such as ricotta cheese, fresh mozzarella, or Swiss cheese. Low-sodium or reduced-sodium cheese. Cream cheese. Yogurt.  Seasonings and condiments  Fresh and dried herbs and spices. Salt-free seasonings. Low-sodium mustard and ketchup. Sodium-free salad dressing. Sodium-free light mayonnaise. Fresh or refrigerated horseradish. Lemon juice. Vinegar.  Other foods  Homemade, reduced-sodium, or low-sodium soups. Unsalted popcorn and pretzels. Low-salt or salt-free chips.  The items listed above may not be a complete list of foods and beverages you can eat. Contact a dietitian for more information.  What foods should I avoid?  Vegetables  Sauerkraut, pickled vegetables, and relishes. Olives. French fries. Onion rings. Regular canned vegetables (not low-sodium or reduced-sodium). Regular canned tomato sauce and paste (not low-sodium or reduced-sodium). Regular tomato and vegetable juice (not low-sodium or reduced-sodium). " Frozen vegetables in sauces.  Grains  Instant hot cereals. Bread stuffing, pancake, and biscuit mixes. Croutons. Seasoned rice or pasta mixes. Noodle soup cups. Boxed or frozen macaroni and cheese. Regular salted crackers. Self-rising flour.  Meats and other proteins  Meat or fish that is salted, canned, smoked, spiced, or pickled. Precooked or cured meat, such as sausages or meat loaves. Garibay. Ham. Pepperoni. Hot dogs. Corned beef. Chipped beef. Salt pork. Jerky. Pickled herring. Anchovies and sardines. Regular canned tuna. Salted nuts.  Dairy  Processed cheese and cheese spreads. Hard cheeses. Cheese curds. Blue cheese. Feta cheese. String cheese. Regular cottage cheese. Buttermilk. Canned milk.  Fats and oils  Salted butter. Regular margarine. Ghee. Garibay fat.  Seasonings and condiments  Onion salt, garlic salt, seasoned salt, table salt, and sea salt. Canned and packaged gravies. Worcestershire sauce. Tartar sauce. Barbecue sauce. Teriyaki sauce. Soy sauce, including reduced-sodium. Steak sauce. Fish sauce. Oyster sauce. Cocktail sauce. Horseradish that you find on the shelf. Regular ketchup and mustard. Meat flavorings and tenderizers. Bouillon cubes. Hot sauce. Pre-made or packaged marinades. Pre-made or packaged taco seasonings. Relishes. Regular salad dressings. Salsa.  Other foods  Salted popcorn and pretzels. Corn chips and puffs. Potato and tortilla chips. Canned or dried soups. Pizza. Frozen entrees and pot pies.  The items listed above may not be a complete list of foods and beverages you should avoid. Contact a dietitian for more information.  Summary  · Eating less sodium can help lower your blood pressure, reduce swelling, and protect your heart, liver, and kidneys.  · Most people on this plan should limit their sodium intake to 1,500-2,000 mg (milligrams) of sodium each day.  · Canned, boxed, and frozen foods are high in sodium. Restaurant foods, fast foods, and pizza are also very high in sodium.  You also get sodium by adding salt to food.  · Try to cook at home, eat more fresh fruits and vegetables, and eat less fast food and canned, processed, or prepared foods.  This information is not intended to replace advice given to you by your health care provider. Make sure you discuss any questions you have with your health care provider.  Document Revised: 01/22/2021 Document Reviewed: 11/18/2020  [employer] Patient Education © 2021 [employer]

## 2021-11-09 ENCOUNTER — TELEPHONE (OUTPATIENT)
Dept: CARDIOLOGY | Facility: CLINIC | Age: 81
End: 2021-11-09

## 2021-11-09 NOTE — TELEPHONE ENCOUNTER
----- Message from KAYLEY Peralta sent at 11/9/2021  9:56 AM EST -----  Notify pt holter result: Baseline rhythm was atrial fibrillation (note new) with average heart rate of 101/maximum heart rate was 138/ minimum heart rate 52 bpm  PVCs were 842 (occassional)  Would recommend increasing metoprolol dose to 100mg BID since average HR over 100 bpm.  Carotid u/s and echo pending

## 2021-11-10 ENCOUNTER — HOSPITAL ENCOUNTER (OUTPATIENT)
Dept: CARDIOLOGY | Facility: HOSPITAL | Age: 81
Discharge: HOME | End: 2021-11-10
Admitting: NURSE PRACTITIONER

## 2021-11-10 DIAGNOSIS — R55 SYNCOPE AND COLLAPSE: ICD-10-CM

## 2021-11-10 LAB
BH CV ECHO MEAS - AO MAX PG: 5 MMHG
BH CV ECHO MEAS - AO MEAN PG: 3 MMHG
BH CV ECHO MEAS - AO ROOT DIAM: 3.1 CM
BH CV ECHO MEAS - AO V2 MAX: 115 CM/SEC
BH CV ECHO MEAS - AVA PLANIMETRY TRACED: 1.4 CM2
BH CV ECHO MEAS - EF(MOD-BP): 53 %
BH CV ECHO MEAS - LA DIMENSION(2D): 4.6 CM
BH CV ECHO MEAS - LV V1 MAX: 50 CM/SEC
BH CV ECHO MEAS - LVIDD: 5 CM
BH CV ECHO MEAS - LVIDS: 2.8 CM
BH CV ECHO MEAS - LVOT DIAM: 2 CM
BH CV ECHO MEAS - MV DEC TIME: 151 MSEC
BH CV ECHO MEAS - MV E MAX VEL: 79 CM/SEC
IVRT: 45 MSEC
LEFT ATRIUM VOLUME INDEX: 35 ML/M2
MAXIMAL PREDICTED HEART RATE: 140 BPM
STRESS TARGET HR: 119 BPM

## 2021-11-10 PROCEDURE — 93306 TTE W/DOPPLER COMPLETE: CPT

## 2021-11-10 PROCEDURE — 93306 TTE W/DOPPLER COMPLETE: CPT | Performed by: SPECIALIST

## 2021-11-11 ENCOUNTER — TELEPHONE (OUTPATIENT)
Dept: CARDIOLOGY | Facility: CLINIC | Age: 81
End: 2021-11-11

## 2021-11-11 NOTE — TELEPHONE ENCOUNTER
----- Message from KAYLEY Peralta sent at 11/10/2021 12:28 PM EST -----  Notify pt echo result: Normal left ventricular systolic function. EF 53%.  Biatrial enlargement. Mild mitral regurgitation. Moderate tricuspid regurgitation. Will monitor with repeat echo in 1 year, no sign of heart failure. Keep January follow up

## 2021-12-28 ENCOUNTER — TELEPHONE (OUTPATIENT)
Dept: CARDIOLOGY | Facility: CLINIC | Age: 81
End: 2021-12-28

## 2021-12-28 NOTE — TELEPHONE ENCOUNTER
----- Message from KAYLEY Peralta sent at 12/28/2021 10:07 AM EST -----  Notify pt carotid u/s show: Mild plaquing bilaterally.  There is no significant internal carotid and vertebral stenosis. Keep January follow up

## 2021-12-31 ENCOUNTER — HOSPITAL ENCOUNTER (EMERGENCY)
Facility: HOSPITAL | Age: 81
Discharge: HOME | End: 2021-12-31
Attending: EMERGENCY MEDICINE | Admitting: EMERGENCY MEDICINE

## 2021-12-31 VITALS
HEIGHT: 74 IN | WEIGHT: 206 LBS | TEMPERATURE: 97.3 F | RESPIRATION RATE: 16 BRPM | BODY MASS INDEX: 26.44 KG/M2 | SYSTOLIC BLOOD PRESSURE: 149 MMHG | DIASTOLIC BLOOD PRESSURE: 91 MMHG | OXYGEN SATURATION: 94 % | HEART RATE: 83 BPM

## 2021-12-31 DIAGNOSIS — R39.11 BENIGN PROSTATIC HYPERPLASIA WITH URINARY HESITANCY: ICD-10-CM

## 2021-12-31 DIAGNOSIS — N40.1 BENIGN PROSTATIC HYPERPLASIA WITH URINARY HESITANCY: ICD-10-CM

## 2021-12-31 DIAGNOSIS — R53.1 GENERALIZED WEAKNESS: Primary | ICD-10-CM

## 2021-12-31 LAB
ALBUMIN SERPL-MCNC: 4.5 G/DL (ref 3.5–5.2)
ALBUMIN/GLOB SERPL: 1.5 G/DL
ALP SERPL-CCNC: 128 U/L (ref 39–117)
ALT SERPL W P-5'-P-CCNC: 13 U/L (ref 1–41)
ANION GAP SERPL CALCULATED.3IONS-SCNC: 15.5 MMOL/L (ref 5–15)
AST SERPL-CCNC: 25 U/L (ref 1–40)
BASOPHILS # BLD AUTO: 0.02 10*3/MM3 (ref 0–0.2)
BASOPHILS NFR BLD AUTO: 0.3 % (ref 0–1.5)
BILIRUB SERPL-MCNC: 1 MG/DL (ref 0–1.2)
BILIRUB UR QL STRIP: NEGATIVE
BUN SERPL-MCNC: 24 MG/DL (ref 8–23)
BUN/CREAT SERPL: 21.8 (ref 7–25)
CALCIUM SPEC-SCNC: 9.9 MG/DL (ref 8.6–10.5)
CHLORIDE SERPL-SCNC: 94 MMOL/L (ref 98–107)
CLARITY UR: CLEAR
CO2 SERPL-SCNC: 25.5 MMOL/L (ref 22–29)
COLOR UR: YELLOW
CREAT SERPL-MCNC: 1.1 MG/DL (ref 0.76–1.27)
DEPRECATED RDW RBC AUTO: 49.3 FL (ref 37–54)
EOSINOPHIL # BLD AUTO: 0.15 10*3/MM3 (ref 0–0.4)
EOSINOPHIL NFR BLD AUTO: 1.9 % (ref 0.3–6.2)
ERYTHROCYTE [DISTWIDTH] IN BLOOD BY AUTOMATED COUNT: 13.4 % (ref 12.3–15.4)
GFR SERPL CREATININE-BSD FRML MDRD: 64 ML/MIN/1.73
GLOBULIN UR ELPH-MCNC: 3.1 GM/DL
GLUCOSE SERPL-MCNC: 96 MG/DL (ref 65–99)
GLUCOSE UR STRIP-MCNC: NEGATIVE MG/DL
HCT VFR BLD AUTO: 38.7 % (ref 37.5–51)
HGB BLD-MCNC: 13.1 G/DL (ref 13–17.7)
HGB UR QL STRIP.AUTO: NEGATIVE
HOLD SPECIMEN: NORMAL
HOLD SPECIMEN: NORMAL
IMM GRANULOCYTES # BLD AUTO: 0.02 10*3/MM3 (ref 0–0.05)
IMM GRANULOCYTES NFR BLD AUTO: 0.3 % (ref 0–0.5)
KETONES UR QL STRIP: NEGATIVE
LEUKOCYTE ESTERASE UR QL STRIP.AUTO: NEGATIVE
LYMPHOCYTES # BLD AUTO: 1.3 10*3/MM3 (ref 0.7–3.1)
LYMPHOCYTES NFR BLD AUTO: 16.8 % (ref 19.6–45.3)
MCH RBC QN AUTO: 33.9 PG (ref 26.6–33)
MCHC RBC AUTO-ENTMCNC: 33.9 G/DL (ref 31.5–35.7)
MCV RBC AUTO: 100.3 FL (ref 79–97)
MONOCYTES # BLD AUTO: 0.77 10*3/MM3 (ref 0.1–0.9)
MONOCYTES NFR BLD AUTO: 9.9 % (ref 5–12)
NEUTROPHILS NFR BLD AUTO: 5.49 10*3/MM3 (ref 1.7–7)
NEUTROPHILS NFR BLD AUTO: 70.8 % (ref 42.7–76)
NITRITE UR QL STRIP: NEGATIVE
NRBC BLD AUTO-RTO: 0 /100 WBC (ref 0–0.2)
PH UR STRIP.AUTO: 5.5 [PH] (ref 5–8)
PLATELET # BLD AUTO: 147 10*3/MM3 (ref 140–450)
PMV BLD AUTO: 9.6 FL (ref 6–12)
POTASSIUM SERPL-SCNC: 4.6 MMOL/L (ref 3.5–5.2)
PROT SERPL-MCNC: 7.6 G/DL (ref 6–8.5)
PROT UR QL STRIP: NEGATIVE
RBC # BLD AUTO: 3.86 10*6/MM3 (ref 4.14–5.8)
SODIUM SERPL-SCNC: 135 MMOL/L (ref 136–145)
SP GR UR STRIP: 1.02 (ref 1–1.03)
UROBILINOGEN UR QL STRIP: NORMAL
WBC NRBC COR # BLD: 7.75 10*3/MM3 (ref 3.4–10.8)
WHOLE BLOOD HOLD SPECIMEN: NORMAL
WHOLE BLOOD HOLD SPECIMEN: NORMAL

## 2021-12-31 PROCEDURE — 51798 US URINE CAPACITY MEASURE: CPT

## 2021-12-31 PROCEDURE — 81003 URINALYSIS AUTO W/O SCOPE: CPT | Performed by: EMERGENCY MEDICINE

## 2021-12-31 PROCEDURE — 99283 EMERGENCY DEPT VISIT LOW MDM: CPT

## 2021-12-31 PROCEDURE — 80053 COMPREHEN METABOLIC PANEL: CPT | Performed by: EMERGENCY MEDICINE

## 2021-12-31 PROCEDURE — 85025 COMPLETE CBC W/AUTO DIFF WBC: CPT | Performed by: EMERGENCY MEDICINE

## 2021-12-31 RX ADMIN — SODIUM CHLORIDE 500 ML: 9 INJECTION, SOLUTION INTRAVENOUS at 21:03

## 2022-01-01 NOTE — ED PROVIDER NOTES
Time: 7:03 PM EST  Arrived by: private car  Chief Complaint: Urinary urgency/dysuria/retention  History provided by: Patient and spouse  History is limited by: Poor memory      History of Present Illness:  Patient is a 81 y.o. year old male that presents to the emergency department with difficulty urinating starting this morning.  Patient did also have some mild generalized weakness starting yesterday/last night.  He had felt fine yesterday had been out throughout most of the day per the wife but did get his booster for Covid yesterday.  Afebrile with no increasing cough no nausea vomiting or abdominal pain.  Today patient is having difficulty creating any urine.  Has urinated here in our emergency department but wife states he may not have produced urine since this morning until that time.  No aggravating or alleviating factors.  Now today patient is too weak to stand.  He did have a minor fall this morning but reports no LOC and has 0 symptoms of pain from his fall.  Does not feel like he had any injury from his fall.  Specifically he denies headache or any other pain.    HPI    Similar Symptoms Previously: Yes  Recently seen: Yes      Patient Care Team  Primary Care Provider: Martin Monahan MD    Past Medical History:     Allergies   Allergen Reactions   • Loratadine Other (See Comments)     Nose bleeds     Past Medical History:   Diagnosis Date   • BPH (benign prostatic hyperplasia)    • Disease of thyroid gland    • Essential hypertension 10/14/2021   • History of CVA (cerebrovascular accident) 2012   • Paroxysmal atrial fibrillation 10/14/2021     Past Surgical History:   Procedure Laterality Date   • LEG SURGERY Right      History reviewed. No pertinent family history.    Home Medications:  Prior to Admission medications    Medication Sig Start Date End Date Taking? Authorizing Provider   acetaminophen (TYLENOL) 500 MG tablet Take 500 mg by mouth Every 6 (Six) Hours As Needed for Mild Pain .     Provider, MD Maribel   Cholecalciferol 125 MCG (5000 UT) tablet     Emergency, Nurse EVELYN Marin   escitalopram (LEXAPRO) 20 MG tablet  21   Emergency, Nurse EVELYN Marin   ezetimibe (ZETIA) 10 MG tablet  21   Emergency, Nurse EVELYN Marin   finasteride (PROSCAR) 5 MG tablet finasteride 5 mg oral tablet take 1 tablet (5 mg) by oral route once daily for 90 days 3/16/2021  Active 3/16/21   [place]ica, Nurse Tania RN   levothyroxine (SYNTHROID, LEVOTHROID) 50 MCG tablet  21   Emergency, Nurse Tania RN   metoprolol succinate XL (TOPROL-XL) 100 MG 24 hr tablet Take 1 tablet by mouth 2 (two) times a day. 10/14/21   Peggy Ordonez APRN   multivitamin with minerals (MULTIVITAMIN ADULT PO) Take 1 tablet by mouth Daily.    Emergency, Nurse EVELYN Marin   rivaroxaban (Xarelto) 20 MG tablet 20 mg.    Emergency, Nurse EVELYN Marin   tamsulosin (Flomax) 0.4 MG capsule 24 hr capsule Flomax 0.4 mg oral capsule take 1 capsule (0.4 mg) by oral route once daily 1/2 hour following the same meal each day for 90 days 3/16/2021  Active 3/16/21   Monica, Nurse EVELYN Marin        Social History:   Social History     Tobacco Use   • Smoking status: Former Smoker     Quit date: 10/14/1961     Years since quittin.2   • Smokeless tobacco: Never Used   Vaping Use   • Vaping Use: Never used   Substance Use Topics   • Alcohol use: Never   • Drug use: Never     Recent travel: no     Review of Systems:  Review of Systems   Constitutional: Positive for fatigue. Negative for chills and fever.   HENT: Negative for congestion, rhinorrhea and sore throat.    Eyes: Negative for pain and visual disturbance.   Respiratory: Negative for apnea, cough, chest tightness and shortness of breath.    Cardiovascular: Negative for chest pain and palpitations.   Gastrointestinal: Negative for abdominal pain, diarrhea, nausea and vomiting.   Genitourinary: Positive for decreased urine volume, difficulty urinating, dysuria and urgency.   Musculoskeletal:  "Negative for joint swelling and myalgias.   Skin: Negative for color change.   Neurological: Negative for seizures and headaches.   Psychiatric/Behavioral: Negative.    All other systems reviewed and are negative.       Physical Exam:  /91   Pulse 83   Temp 97.3 °F (36.3 °C) (Oral)   Resp 16   Ht 188 cm (74\")   Wt 93.4 kg (206 lb)   SpO2 94%   BMI 26.45 kg/m²     Physical Exam  Vitals and nursing note reviewed.   Constitutional:       Appearance: Normal appearance.   HENT:      Head: Normocephalic and atraumatic.      Nose: Nose normal.      Mouth/Throat:      Mouth: Mucous membranes are dry.   Eyes:      Extraocular Movements: Extraocular movements intact.      Pupils: Pupils are equal, round, and reactive to light.   Cardiovascular:      Rate and Rhythm: Normal rate and regular rhythm.      Heart sounds: Normal heart sounds.   Pulmonary:      Effort: Pulmonary effort is normal.      Breath sounds: Normal breath sounds.   Abdominal:      General: Bowel sounds are normal.      Palpations: Abdomen is soft.      Tenderness: There is no abdominal tenderness.   Musculoskeletal:         General: No swelling. Normal range of motion.      Cervical back: Normal range of motion and neck supple.   Skin:     General: Skin is warm and dry.      Coloration: Skin is not jaundiced.   Neurological:      General: No focal deficit present.      Mental Status: He is alert and oriented to person, place, and time. Mental status is at baseline.   Psychiatric:         Mood and Affect: Mood normal.         Behavior: Behavior normal.         Judgment: Judgment normal.                Medications in the Emergency Department:  Medications   sodium chloride 0.9 % bolus 500 mL (0 mL Intravenous Stopped 12/31/21 2153)        Labs  Lab Results (last 24 hours)     Procedure Component Value Units Date/Time    CBC & Differential [[id number]]  (Abnormal) Collected: 12/31/21 1900    Specimen: Blood Updated: 12/31/21 1907    Narrative:      " The following orders were created for panel order CBC & Differential.  Procedure                               Abnormality         Status                     ---------                               -----------         ------                     CBC Auto Differential[[id number]]        Abnormal            Final result                 Please view results for these tests on the individual orders.    Comprehensive Metabolic Panel [[id number]]  (Abnormal) Collected: 12/31/21 1900    Specimen: Blood Updated: 12/31/21 1931     Glucose 96 mg/dL      BUN 24 mg/dL      Creatinine 1.10 mg/dL      Sodium 135 mmol/L      Potassium 4.6 mmol/L      Chloride 94 mmol/L      CO2 25.5 mmol/L      Calcium 9.9 mg/dL      Total Protein 7.6 g/dL      Albumin 4.50 g/dL      ALT (SGPT) 13 U/L      AST (SGOT) 25 U/L      Alkaline Phosphatase 128 U/L      Total Bilirubin 1.0 mg/dL      eGFR Non African Amer 64 mL/min/1.73      Globulin 3.1 gm/dL      A/G Ratio 1.5 g/dL      BUN/Creatinine Ratio 21.8     Anion Gap 15.5 mmol/L     Narrative:      GFR Normal >60  Chronic Kidney Disease <60  Kidney Failure <15      CBC Auto Differential [[id number]]  (Abnormal) Collected: 12/31/21 1900    Specimen: Blood Updated: 12/31/21 1907     WBC 7.75 10*3/mm3      RBC 3.86 10*6/mm3      Hemoglobin 13.1 g/dL      Hematocrit 38.7 %      .3 fL      MCH 33.9 pg      MCHC 33.9 g/dL      RDW 13.4 %      RDW-SD 49.3 fl      MPV 9.6 fL      Platelets 147 10*3/mm3      Neutrophil % 70.8 %      Lymphocyte % 16.8 %      Monocyte % 9.9 %      Eosinophil % 1.9 %      Basophil % 0.3 %      Immature Grans % 0.3 %      Neutrophils, Absolute 5.49 10*3/mm3      Lymphocytes, Absolute 1.30 10*3/mm3      Monocytes, Absolute 0.77 10*3/mm3      Eosinophils, Absolute 0.15 10*3/mm3      Basophils, Absolute 0.02 10*3/mm3      Immature Grans, Absolute 0.02 10*3/mm3      nRBC 0.0 /100 WBC     Urinalysis With Culture If Indicated - Urine, Clean Catch [[id number]]  (Normal)  Collected: 12/31/21 2007    Specimen: Urine, Clean Catch Updated: 12/31/21 2019     Color, UA Yellow     Appearance, UA Clear     pH, UA 5.5     Specific Gravity, UA 1.021     Glucose, UA Negative     Ketones, UA Negative     Bilirubin, UA Negative     Blood, UA Negative     Protein, UA Negative     Leuk Esterase, UA Negative     Nitrite, UA Negative     Urobilinogen, UA 0.2 E.U./dL    Narrative:      Urine microscopic not indicated.           Imaging:  No Radiology Exams Resulted Within Past 24 Hours    Procedures:  Procedures    Progress                            Medical Decision Making:  MDM  Number of Diagnoses or Management Options     Amount and/or Complexity of Data Reviewed  Clinical lab tests: reviewed  Decide to obtain previous medical records or to obtain history from someone other than the patient: yes  Independent visualization of images, tracings, or specimens: yes    Risk of Complications, Morbidity, and/or Mortality  Presenting problems: moderate  Management options: low    Patient Progress  Patient progress: stable       Final diagnoses:   Generalized weakness   Benign prostatic hyperplasia with urinary hesitancy        Disposition:  ED Disposition     ED Disposition Condition Comment    Discharge Stable           This medical record created using voice recognition software and may contain unintended errors.         Angel Putnam MD  12/31/21 1756

## 2022-01-01 NOTE — ED NOTES
Bladder scanned pt, 200-215ml residual. Pt was able to urinate 50ml independently.      Puneet Ybarra, RN  12/31/21 2021

## 2022-01-01 NOTE — DISCHARGE INSTRUCTIONS
Return to the emergency department immediately for fever, inability to pass urine, uncontrolled vomiting.  Stay well-hydrated.  Continue to take your Flomax as already prescribed.

## 2022-01-19 ENCOUNTER — TELEPHONE (OUTPATIENT)
Dept: UROLOGY | Facility: CLINIC | Age: 82
End: 2022-01-19

## 2022-01-19 DIAGNOSIS — R31.0 GROSS HEMATURIA: Primary | ICD-10-CM

## 2022-01-19 NOTE — TELEPHONE ENCOUNTER
Pt is having gross hematuria and trouble urinating. Want to be seen asap or talk to someone about what to do.

## 2022-01-20 ENCOUNTER — LAB (OUTPATIENT)
Dept: LAB | Facility: HOSPITAL | Age: 82
End: 2022-01-20

## 2022-01-20 ENCOUNTER — OFFICE VISIT (OUTPATIENT)
Dept: CARDIOLOGY | Facility: CLINIC | Age: 82
End: 2022-01-20

## 2022-01-20 VITALS
BODY MASS INDEX: 25.93 KG/M2 | WEIGHT: 202 LBS | HEIGHT: 74 IN | SYSTOLIC BLOOD PRESSURE: 137 MMHG | HEART RATE: 107 BPM | DIASTOLIC BLOOD PRESSURE: 109 MMHG

## 2022-01-20 DIAGNOSIS — I10 ESSENTIAL HYPERTENSION: ICD-10-CM

## 2022-01-20 DIAGNOSIS — I48.19 ATRIAL FIBRILLATION, PERSISTENT: Primary | ICD-10-CM

## 2022-01-20 DIAGNOSIS — R31.0 GROSS HEMATURIA: ICD-10-CM

## 2022-01-20 LAB
BACTERIA UR QL AUTO: NORMAL /HPF
BILIRUB UR QL STRIP: NEGATIVE
CLARITY UR: CLEAR
COD CRY URNS QL: NORMAL /HPF
COLOR UR: YELLOW
GLUCOSE UR STRIP-MCNC: NEGATIVE MG/DL
HGB UR QL STRIP.AUTO: ABNORMAL
HYALINE CASTS UR QL AUTO: NORMAL /LPF
KETONES UR QL STRIP: NEGATIVE
LEUKOCYTE ESTERASE UR QL STRIP.AUTO: NEGATIVE
NITRITE UR QL STRIP: NEGATIVE
PH UR STRIP.AUTO: 5.5 [PH] (ref 5–8)
PROT UR QL STRIP: NEGATIVE
RBC # UR STRIP: NORMAL /HPF
REF LAB TEST METHOD: NORMAL
SP GR UR STRIP: 1.02 (ref 1–1.03)
SQUAMOUS #/AREA URNS HPF: NORMAL /HPF
STARCH GRANULES URNS QL MICRO: NORMAL /HPF
UROBILINOGEN UR QL STRIP: ABNORMAL
WBC # UR STRIP: NORMAL /HPF

## 2022-01-20 PROCEDURE — 99214 OFFICE O/P EST MOD 30 MIN: CPT | Performed by: INTERNAL MEDICINE

## 2022-01-20 PROCEDURE — 87086 URINE CULTURE/COLONY COUNT: CPT

## 2022-01-20 PROCEDURE — 81001 URINALYSIS AUTO W/SCOPE: CPT

## 2022-01-20 RX ORDER — DILTIAZEM HYDROCHLORIDE 120 MG/1
120 CAPSULE, EXTENDED RELEASE ORAL DAILY
Qty: 90 CAPSULE | Refills: 3 | Status: SHIPPED | OUTPATIENT
Start: 2022-01-20 | End: 2022-07-20

## 2022-01-20 NOTE — PROGRESS NOTES
Chief Complaint  E.R  follow up and Atrial Fibrillation    Subjective    She has had persistent episodes of weakness and falls about 3 episodes since being seen last.  He was seen in the emergency room on December 31 cardiovascular work-up that time was unremarkable other than being atrial fibrillation.  The patient also has reported some hematuria symptoms the wife reports the patient has been fatigued and worn out more lethargic after the episodes of falls especially.    Past Medical History:   Diagnosis Date   • BPH (benign prostatic hyperplasia)    • Disease of thyroid gland    • Essential hypertension 10/14/2021   • History of CVA (cerebrovascular accident) 2012   • Paroxysmal atrial fibrillation 10/14/2021         Current Outpatient Medications:   •  acetaminophen (TYLENOL) 500 MG tablet, Take 500 mg by mouth Every 6 (Six) Hours As Needed for Mild Pain ., Disp: , Rfl:   •  Cholecalciferol 125 MCG (5000 UT) tablet, , Disp: , Rfl:   •  escitalopram (LEXAPRO) 20 MG tablet, , Disp: , Rfl:   •  ezetimibe (ZETIA) 10 MG tablet, , Disp: , Rfl:   •  finasteride (PROSCAR) 5 MG tablet, finasteride 5 mg oral tablet take 1 tablet (5 mg) by oral route once daily for 90 days 3/16/2021  Active, Disp: , Rfl:   •  levothyroxine (SYNTHROID, LEVOTHROID) 50 MCG tablet, , Disp: , Rfl:   •  metoprolol succinate XL (TOPROL-XL) 100 MG 24 hr tablet, Take 1 tablet by mouth 2 (two) times a day., Disp: 180 tablet, Rfl: 3  •  multivitamin with minerals (MULTIVITAMIN ADULT PO), Take 1 tablet by mouth Daily., Disp: , Rfl:   •  rivaroxaban (Xarelto) 20 MG tablet, 20 mg., Disp: , Rfl:   •  tamsulosin (Flomax) 0.4 MG capsule 24 hr capsule, Flomax 0.4 mg oral capsule take 1 capsule (0.4 mg) by oral route once daily 1/2 hour following the same meal each day for 90 days 3/16/2021  Active, Disp: , Rfl:   •  dilTIAZem XR (DILACOR XR) 120 MG 24 hr capsule, Take 1 capsule by mouth Daily., Disp: 90 capsule, Rfl: 3    There are no discontinued  "medications.  Allergies   Allergen Reactions   • Loratadine Other (See Comments)     Nose bleeds        Social History     Tobacco Use   • Smoking status: Former Smoker     Quit date: 10/14/1961     Years since quittin.3   • Smokeless tobacco: Never Used   Vaping Use   • Vaping Use: Never used   Substance Use Topics   • Alcohol use: Never   • Drug use: Never       History reviewed. No pertinent family history.     Objective     BP (!) 137/109 (BP Location: Right arm, Patient Position: Sitting)   Pulse 107   Ht 188 cm (74\")   Wt 91.6 kg (202 lb)   BMI 25.94 kg/m²       Physical Exam    General Appearance:   · no acute distress  · Alert and oriented x3  HENT:   · lips not cyanotic  · Atraumatic  Neck:  · No jvd   · supple  Respiratory:  · no respiratory distress  · normal breath sounds  · no rales  Cardiovascular:  · Irregularly irregular  · no S3, no S4   · no murmur  · no rub  Extremities  · No cyanosis  · lower extremity edema: Mild  Skin:   · warm, dry  · No rashes      Result Review :     No results found for: PROBNP  CMP    CMP 21   Glucose 104 (A) 96   BUN 14 24 (A)   Creatinine 1.04 1.10   eGFR Non African Am 69 64   Sodium 138 135 (A)   Potassium 4.1 4.6   Chloride 103 94 (A)   Calcium 9.3 9.9   Albumin 4.00 4.50   Total Bilirubin 1.2 1.0   Alkaline Phosphatase 116 128 (A)   AST (SGOT) 23 25   ALT (SGPT) 13 13   (A) Abnormal value            CBC w/diff    CBC w/Diff 21   WBC 7.76 7.75   RBC 3.34 (A) 3.86 (A)   Hemoglobin 11.6 (A) 13.1   Hematocrit 34.9 (A) 38.7   .5 (A) 100.3 (A)   MCH 34.7 (A) 33.9 (A)   MCHC 33.2 33.9   RDW 13.1 13.4   Platelets 126 (A) 147   Neutrophil Rel % 66.6 70.8   Immature Granulocyte Rel % 0.3 0.3   Lymphocyte Rel % 20.4 16.8 (A)   Monocyte Rel % 8.4 9.9   Eosinophil Rel % 3.9 1.9   Basophil Rel % 0.4 0.3   (A) Abnormal value             No results found for: TSH   No results found for: FREET4   No results found for: DDIMERQUANT  Magnesium "   Date Value Ref Range Status   09/30/2021 1.9 1.6 - 2.4 mg/dL Final      No results found for: DIGOXIN   Lab Results   Component Value Date    TROPONINT <0.010 09/30/2021             No results found for: POCTROP    Results for orders placed during the hospital encounter of 11/10/21    Adult Transthoracic Echo Complete W/ Cont if Necessary Per Protocol    Interpretation Summary  Fibrocalcific mitral and aortic valves.  Normal left ventricular systolic function.  Biatrial enlargement.  Mild mitral regurgitation.  Moderate tricuspid regurgitation.                 Diagnoses and all orders for this visit:    1. Atrial fibrillation, persistent (HCC) (Primary)  Assessment & Plan:  Mild persistent elevation rate did not feel cardiovascularly there are any issues contributing to his overall weakness and and fall episodes do not feel they are bradycardic or hypotensive driven.  Recommend diltiazem 120 to help with his mildly elevated heart rate.  Patient has been having some mild hematuria is set to see urologist if no solution can be found for his symptoms recommended a trial switching over to Eliquis 5 twice daily      2. Essential hypertension  Assessment & Plan:  In office diastolic elevation but at home per patient's log been well controlled no significant hypotensive episode noted did instruct patient that if he should have another weakness spell that they can check his blood pressure to see if there is any chance of orthostasis      Other orders  -     dilTIAZem XR (DILACOR XR) 120 MG 24 hr capsule; Take 1 capsule by mouth Daily.  Dispense: 90 capsule; Refill: 3          Follow Up     Return in about 6 months (around 7/20/2022) for EKG with F/U, Follow with Peggy Ordonez.          Patient was given instructions and counseling regarding his condition or for health maintenance advice. Please see specific information pulled into the AVS if appropriate.

## 2022-01-20 NOTE — ASSESSMENT & PLAN NOTE
Mild persistent elevation rate did not feel cardiovascularly there are any issues contributing to his overall weakness and and fall episodes do not feel they are bradycardic or hypotensive driven.  Recommend diltiazem 120 to help with his mildly elevated heart rate.  Patient has been having some mild hematuria is set to see urologist if no solution can be found for his symptoms recommended a trial switching over to Eliquis 5 twice daily

## 2022-01-20 NOTE — ASSESSMENT & PLAN NOTE
In office diastolic elevation but at home per patient's log been well controlled no significant hypotensive episode noted did instruct patient that if he should have another weakness spell that they can check his blood pressure to see if there is any chance of orthostasis

## 2022-01-21 ENCOUNTER — OFFICE VISIT (OUTPATIENT)
Dept: UROLOGY | Facility: CLINIC | Age: 82
End: 2022-01-21

## 2022-01-21 VITALS — WEIGHT: 209.8 LBS | BODY MASS INDEX: 26.92 KG/M2 | RESPIRATION RATE: 16 BRPM | HEIGHT: 74 IN

## 2022-01-21 DIAGNOSIS — R31.0 GROSS HEMATURIA: Primary | ICD-10-CM

## 2022-01-21 PROBLEM — I48.91 ATRIAL FIBRILLATION: Status: ACTIVE | Noted: 2021-10-14

## 2022-01-21 LAB
BACTERIA SPEC AEROBE CULT: NORMAL
URINE VOLUME: 175

## 2022-01-21 PROCEDURE — 51798 US URINE CAPACITY MEASURE: CPT | Performed by: NURSE PRACTITIONER

## 2022-01-21 PROCEDURE — 99213 OFFICE O/P EST LOW 20 MIN: CPT | Performed by: NURSE PRACTITIONER

## 2022-01-21 NOTE — PROGRESS NOTES
Chief Complaint: Blood in Urine (see blood in urine off and on)    Subjective         History of Present Illness  Justyn Galo is a 81 y.o. male presents to [facility] to be seen for gross hematuria.    Patients wife states that she has seen blood on the front of his pants and has seen blood in his incontinence brief a few times.     His wife states that he has the urge to void often and cannot and he gets up a lot at night to pee.    He does have hesitancy, slow stream, intermittent and weak stream.     He is on tamsulosin 0.4 mg q day and finasteride.  Wife states he cannot tolerate 2 tamsulosin as it causes severe lethargy.    His urinalysis does reveal large amount of starch however when questioning patient and patient's spouse they do endorse that the patient uses powder to his groin area.    Urinalysis is unremarkable for microscopic hematuria.    He has no HX of renal stones.    Patient has recurrent falls as well .    No known family HX of  malignancies.        Objective     Past Medical History:   Diagnosis Date   • BPH (benign prostatic hyperplasia)    • Disease of thyroid gland    • Essential hypertension 10/14/2021   • History of CVA (cerebrovascular accident) 2012   • Paroxysmal atrial fibrillation 10/14/2021       Past Surgical History:   Procedure Laterality Date   • LEG SURGERY Right          Current Outpatient Medications:   •  acetaminophen (TYLENOL) 500 MG tablet, Take 500 mg by mouth Every 6 (Six) Hours As Needed for Mild Pain ., Disp: , Rfl:   •  Cholecalciferol 125 MCG (5000 UT) tablet, , Disp: , Rfl:   •  dilTIAZem XR (DILACOR XR) 120 MG 24 hr capsule, Take 1 capsule by mouth Daily., Disp: 90 capsule, Rfl: 3  •  escitalopram (LEXAPRO) 20 MG tablet, , Disp: , Rfl:   •  ezetimibe (ZETIA) 10 MG tablet, , Disp: , Rfl:   •  finasteride (PROSCAR) 5 MG tablet, finasteride 5 mg oral tablet take 1 tablet (5 mg) by oral route once daily for 90 days 3/16/2021  Active,  "Disp: , Rfl:   •  levothyroxine (SYNTHROID, LEVOTHROID) 50 MCG tablet, , Disp: , Rfl:   •  metoprolol succinate XL (TOPROL-XL) 100 MG 24 hr tablet, Take 1 tablet by mouth 2 (two) times a day., Disp: 180 tablet, Rfl: 3  •  multivitamin with minerals (MULTIVITAMIN ADULT PO), Take 1 tablet by mouth Daily., Disp: , Rfl:   •  rivaroxaban (Xarelto) 20 MG tablet, 20 mg., Disp: , Rfl:   •  tamsulosin (Flomax) 0.4 MG capsule 24 hr capsule, Flomax 0.4 mg oral capsule take 1 capsule (0.4 mg) by oral route once daily 1/2 hour following the same meal each day for 90 days 3/16/2021  Active, Disp: , Rfl:     Allergies   Allergen Reactions   • Loratadine Other (See Comments) and Unknown - High Severity     Nose bleeds        History reviewed. No pertinent family history.    Social History     Socioeconomic History   • Marital status:    Tobacco Use   • Smoking status: Former Smoker     Quit date: 10/14/1961     Years since quittin.3   • Smokeless tobacco: Never Used   Vaping Use   • Vaping Use: Never used   Substance and Sexual Activity   • Alcohol use: Never   • Drug use: Never   • Sexual activity: Defer       Vital Signs:   Resp 16   Ht 188 cm (74\")   Wt 95.2 kg (209 lb 12.8 oz)   BMI 26.94 kg/m²      Physical Exam     Result Review :   The following data was reviewed by: KAYLEY Foster on 2022:  Results for orders placed or performed in visit on 22   Urinalysis without microscopic (no culture) - Urine, Clean Catch    Specimen: Urine, Clean Catch   Result Value Ref Range    Color, UA Yellow Yellow, Straw    Appearance, UA Clear Clear    pH, UA 5.5 5.0 - 8.0    Specific Gravity, UA 1.024 1.005 - 1.030    Glucose, UA Negative Negative    Ketones, UA Negative Negative    Bilirubin, UA Negative Negative    Blood, UA Small (1+) (A) Negative    Protein, UA Negative Negative    Leuk Esterase, UA Negative Negative    Nitrite, UA Negative Negative    Urobilinogen, UA 0.2 E.U./dL 0.2 - 1.0 E.U./dL "   Urinalysis, Microscopic Only - Urine, Clean Catch    Specimen: Urine, Clean Catch   Result Value Ref Range    RBC, UA None Seen None Seen, 0-2 /HPF    WBC, UA None Seen None Seen, 0-2 /HPF    Bacteria, UA None Seen None Seen /HPF    Squamous Epithelial Cells, UA None Seen None Seen, 0-2 /HPF    Hyaline Casts, UA None Seen None Seen /LPF    Calcium Oxalate Crystals, UA Moderate/2+ None Seen /HPF    Starch, UA Large/3+ None Seen /HPF    Methodology Automated Microscopy           Procedures        Assessment and Plan    Diagnoses and all orders for this visit:    1. Gross hematuria (Primary)  -     CT Abdomen Pelvis With & Without Contrast; Future  -     Cystoscopy; Future  -     Bladder Scan      Discussed with patient and patient's wife given intermittent nature of gross hematuria I would like to do a CT scan of his abdomen and pelvis as well as a cystoscopy.  Also discussed with patient and patient's wife as he is on maximum medical therapy and cannot tolerate increasing his tamsulosin dosage a cystoscopy would also be warranted to see if he would be a candidate for a prostate procedure to help alleviate some of his urinary symptoms.      I spent 10 minutes caring for Justyn on this date of service. This time includes time spent by me in the following activities:reviewing tests, obtaining and/or reviewing a separately obtained history, performing a medically appropriate examination and/or evaluation , counseling and educating the patient/family/caregiver, ordering medications, tests, or procedures, and documenting information in the medical record  Follow Up   Return for With Dr. Bran For Cystoscopy.  Patient was given instructions and counseling regarding his condition or for health maintenance advice. Please see specific information pulled into the AVS if appropriate.         This document has been electronically signed by KAYLEY Foster  January 21, 2022 10:20 EST

## 2022-02-17 ENCOUNTER — HOSPITAL ENCOUNTER (OUTPATIENT)
Dept: CT IMAGING | Facility: HOSPITAL | Age: 82
Discharge: HOME | End: 2022-02-17
Admitting: NURSE PRACTITIONER

## 2022-02-17 DIAGNOSIS — R31.0 GROSS HEMATURIA: ICD-10-CM

## 2022-02-17 LAB — CREAT BLDA-MCNC: 1.1 MG/DL

## 2022-02-17 PROCEDURE — 74178 CT ABD&PLV WO CNTR FLWD CNTR: CPT

## 2022-02-17 PROCEDURE — 0 IOPAMIDOL PER 1 ML: Performed by: NURSE PRACTITIONER

## 2022-02-17 PROCEDURE — 82565 ASSAY OF CREATININE: CPT

## 2022-02-17 RX ADMIN — IOPAMIDOL 100 ML: 755 INJECTION, SOLUTION INTRAVENOUS at 13:17

## 2022-02-18 ENCOUNTER — TELEPHONE (OUTPATIENT)
Dept: UROLOGY | Facility: CLINIC | Age: 82
End: 2022-02-18

## 2022-03-17 PROBLEM — R31.0 GROSS HEMATURIA: Status: ACTIVE | Noted: 2022-03-17

## 2022-03-17 NOTE — PROGRESS NOTES
Procedures       Urinalysis was checked today and was negative for signs of infection      Cytoscopy Procedure:     Procedure: Flexible cytoscope was passed per urethra into the bladder without difficulty after proper consent. The bladder was inspected in a systematic meridian fashion.     3 cm prostate    Minor trabeculations throughout, no pathology    There were no tumors, lesions, stones, or other abnormalities noted within the bladder. Of note, there was no increased vascularity as well. Both ureteral orifices were identified and were normal in appearance. The flexible cytoscope was removed. The patient tolerated the procedure well.     2/22 CT uro-- tiny nonobstructing stone upper left kidney, negative otherwise.  Right side not opacified on delayed      Right side delayed not complete, discussed this with the patient after discussion of risk and benefits we will do office cystoscopy and not cystoscopy with bilateral retrogrades.  Risk and benefits discussed, accepts risk.  Because of his age I do think not doing anesthesia is reasonable.    Plan      Continue Flomax and finasteride      Follow-up with nurse practitioner in 1 year with UA with micro    This document has been electronically signed by Lukas Bran MD  March 17, 2022 07:24 EDT

## 2022-03-18 ENCOUNTER — OFFICE VISIT (OUTPATIENT)
Dept: UROLOGY | Facility: CLINIC | Age: 82
End: 2022-03-18

## 2022-03-18 DIAGNOSIS — R31.0 GROSS HEMATURIA: Primary | ICD-10-CM

## 2022-03-18 PROCEDURE — 52000 CYSTOURETHROSCOPY: CPT | Performed by: UROLOGY

## 2022-04-12 RX ORDER — RIVAROXABAN 20 MG/1
TABLET, FILM COATED ORAL
Qty: 90 TABLET | Refills: 1 | Status: SHIPPED | OUTPATIENT
Start: 2022-04-12 | End: 2022-10-28

## 2022-04-15 ENCOUNTER — OFFICE VISIT (OUTPATIENT)
Dept: SLEEP MEDICINE | Facility: HOSPITAL | Age: 82
End: 2022-04-15

## 2022-04-15 VITALS
WEIGHT: 203 LBS | HEART RATE: 105 BPM | TEMPERATURE: 97 F | DIASTOLIC BLOOD PRESSURE: 81 MMHG | OXYGEN SATURATION: 97 % | BODY MASS INDEX: 26.05 KG/M2 | SYSTOLIC BLOOD PRESSURE: 127 MMHG | HEIGHT: 74 IN

## 2022-04-15 DIAGNOSIS — G47.8 NON-RESTORATIVE SLEEP: ICD-10-CM

## 2022-04-15 DIAGNOSIS — G47.10 HYPERSOMNIA: Primary | ICD-10-CM

## 2022-04-15 DIAGNOSIS — G47.30 SLEEP APNEA, UNSPECIFIED TYPE: ICD-10-CM

## 2022-04-15 DIAGNOSIS — G47.54 PARASOMNIA IN CONDITIONS CLASSIFIED ELSEWHERE: ICD-10-CM

## 2022-04-15 DIAGNOSIS — G47.411 CATAPLEXY: ICD-10-CM

## 2022-04-15 PROCEDURE — 99204 OFFICE O/P NEW MOD 45 MIN: CPT | Performed by: FAMILY MEDICINE

## 2022-04-15 NOTE — PROGRESS NOTES
[facility]ultation       Reason for Consultation: SHELLEY      Patient Care Team:  Martin Monahan MD as PCP - General (Internal Medicine)  Ilana Sanchez APRN as Nurse Practitioner (Nurse Practitioner)  Abigail Savage MD as Consulting Physician (Sleep Medicine)      History of present illness:  Thank you for asking me to see your patient.  The patient is a 81 y.o. male With atrial fibrillation hypertension hypothyroidism history of CVA presents today for discussing obstructive sleep apnea diagnosis.  Patient reports almost 5 years ago he had a sleep study where he was prescribed a Pap breathing machine; reports when he did use Pap breathing machine it did not help him.  He is awake only 4 to 6 hours most days because he is sleeping most the time otherwise.  Overweight BMI 26.1.  Bedtime 10:30 PM to 11:30 PM sleep latency 5 to 10 minutes wake anytime between 11 AM to 1 PM sometimes sleeps until 2 PM average hours slept varies naps all day long no rotating shifts.    Reports hypersomnia nonrestorative sleep does not drive due to sleepiness has had near accidents due to sleepiness; also reports some snoring witnessed apneas waking up gasping for breath dry mouth sudden episodes of sleep during the day possible sleep paralysis episodes nocturia 3-4 times a night; sleep talks all night long; whole body jerks during sleep as well.  No family history of sleep apnea he is aware of.      ESS: 24    Social History: Retired no tobacco use mostly drinks decaffeinated beverages no drug use    Allergies:  Loratadine    Family History: SHELLEY no       Current Outpatient Medications:   •  acetaminophen (TYLENOL) 500 MG tablet, Take 500 mg by mouth Every 6 (Six) Hours As Needed for Mild Pain ., Disp: , Rfl:   •  Cholecalciferol 125 MCG (5000 UT) tablet, , Disp: , Rfl:   •  dilTIAZem XR (DILACOR XR) 120 MG 24 hr capsule, Take 1 capsule by mouth Daily., Disp: 90 capsule, Rfl: 3  •  escitalopram  "(LEXAPRO) 20 MG tablet, , Disp: , Rfl:   •  ezetimibe (ZETIA) 10 MG tablet, , Disp: , Rfl:   •  finasteride (PROSCAR) 5 MG tablet, finasteride 5 mg oral tablet take 1 tablet (5 mg) by oral route once daily for 90 days 3/16/2021  Active, Disp: , Rfl:   •  levothyroxine (SYNTHROID, LEVOTHROID) 50 MCG tablet, , Disp: , Rfl:   •  metoprolol succinate XL (TOPROL-XL) 100 MG 24 hr tablet, Take 1 tablet by mouth 2 (two) times a day., Disp: 180 tablet, Rfl: 3  •  multivitamin with minerals (MULTIVITAMIN ADULT PO), Take 1 tablet by mouth Daily., Disp: , Rfl:   •  tamsulosin (Flomax) 0.4 MG capsule 24 hr capsule, Flomax 0.4 mg oral capsule take 1 capsule (0.4 mg) by oral route once daily 1/2 hour following the same meal each day for 90 days 3/16/2021  Active, Disp: , Rfl:   •  Xarelto 20 MG tablet, TAKE ONE TABLET BY MOUTH DAILY, Disp: 90 tablet, Rfl: 1    Vital Signs:    Vitals:    04/15/22 0900   BP: 127/81   Pulse: 105   Temp: 97 °F (36.1 °C)   SpO2: 97%   Weight: 92.1 kg (203 lb)   Height: 188 cm (74\")      Body mass index is 26.06 kg/m².         REVIEW OF SYSTEMS.  Full review of systems available on the intake form which is scanned in the media tab.  The relevant positive are noted below  1. Daytime excessive sleepiness with [place] Sleepiness Scale :Total score: 24   2. Snoring  3. Irregular heartbeat      Physical exam:  Vitals:    04/15/22 0900   BP: 127/81   Pulse: 105   Temp: 97 °F (36.1 °C)   SpO2: 97%   Weight: 92.1 kg (203 lb)   Height: 188 cm (74\")    Body mass index is 26.06 kg/m².    HEENT: Head is atraumatic, normocephalic  Eyes: pupils are round equal and reacting to light and accommodation, conjunctiva normal  Throat: tongue normal, oral airway Mallampati class 2-3  NECK: , trachea is in the midline, thyroid not enlarged  RESPIRATORY SYSTEM: Regular respirations  CARDIOVASULAR SYSTEM: Regular rate  EXTREMITES: No cyanosis, clubbing  NEUROLOGICAL SYSTEM: Oriented x 3, no gross motor defects, gait " normal      Impression:  1. Hypersomnia    2. Non-restorative sleep    3. Sleep apnea, unspecified type    4. Parasomnia in conditions classified elsewhere    5. Cataplexy        Plan:    Good sleep hygiene measures should be maintained.  Weight loss would be beneficial in this patient who is overweight BMI 26.1.    I discussed the pathophysiology of obstructive sleep apnea with the patient.  We discussed the adverse outcomes associated with untreated sleep-disordered breathing.  We discussed treatment modalities of obstructive sleep apnea including CPAP device as well as oral mandibular advancement device. Sleep study will be scheduled to establish definitive diagnosis of sleep disorder breathing.  Weight loss will be strongly beneficial in order to reduce the severity of sleep-disordered breathing.  Patient has narrow oropharyngeal structure.  Caution during activities that require prolonged concentration is strongly advised.  Patient will be notified of sleep study results after sleep study is completed.  If sleep apnea is only mild,  oral mandibular advancement device may be one of the treatment options.  However if sleep apnea is moderately severe, CPAP treatment will be strongly encouraged.  The patient is not opposed to treatment with CPAP device if we confirm significant obstructive sleep apnea on polysomnography.     Significant hypersomnia nonrestorative sleep; may be dementia aspect contributing to symptoms.  Given information on neuropsychology testing to follow-up with primary care about.  Wife concerned about narcolepsy.  Discussed even if he has narcolepsy he may not be able to tolerate stimulant medications due to his cardiac history.  However testing may give us an answer in terms of his hypersomnia nonrestorative sleep.  We will follow-up MSLT testing.  Needs one-to-one.    Thank you for allowing me to participate in your patient's care.    Abigail Savage MD  Sleep Medicine  04/15/22  10:49  EDT

## 2022-06-13 DIAGNOSIS — N40.1 BENIGN PROSTATIC HYPERPLASIA WITH WEAK URINARY STREAM: Primary | ICD-10-CM

## 2022-06-13 DIAGNOSIS — R39.12 BENIGN PROSTATIC HYPERPLASIA WITH WEAK URINARY STREAM: Primary | ICD-10-CM

## 2022-06-13 RX ORDER — FINASTERIDE 5 MG/1
TABLET, FILM COATED ORAL
Qty: 90 TABLET | Refills: 4 | Status: SHIPPED | OUTPATIENT
Start: 2022-06-13 | End: 2023-03-21 | Stop reason: SDUPTHER

## 2022-06-13 RX ORDER — TAMSULOSIN HYDROCHLORIDE 0.4 MG/1
CAPSULE ORAL
Qty: 90 CAPSULE | Refills: 4 | Status: SHIPPED | OUTPATIENT
Start: 2022-06-13 | End: 2023-03-21 | Stop reason: SDUPTHER

## 2022-06-28 ENCOUNTER — HOSPITAL ENCOUNTER (OUTPATIENT)
Dept: SLEEP MEDICINE | Facility: HOSPITAL | Age: 82
Discharge: HOME | End: 2022-06-28
Admitting: FAMILY MEDICINE

## 2022-06-28 DIAGNOSIS — G47.10 HYPERSOMNIA: ICD-10-CM

## 2022-06-28 DIAGNOSIS — G47.30 SLEEP APNEA, UNSPECIFIED TYPE: ICD-10-CM

## 2022-06-28 DIAGNOSIS — G47.8 NON-RESTORATIVE SLEEP: ICD-10-CM

## 2022-06-28 DIAGNOSIS — G47.411 CATAPLEXY: ICD-10-CM

## 2022-06-28 DIAGNOSIS — G47.54 PARASOMNIA IN CONDITIONS CLASSIFIED ELSEWHERE: ICD-10-CM

## 2022-06-28 PROCEDURE — 95810 POLYSOM 6/> YRS 4/> PARAM: CPT

## 2022-06-28 PROCEDURE — 95810 POLYSOM 6/> YRS 4/> PARAM: CPT | Performed by: FAMILY MEDICINE

## 2022-06-29 ENCOUNTER — HOSPITAL ENCOUNTER (OUTPATIENT)
Dept: SLEEP MEDICINE | Facility: HOSPITAL | Age: 82
End: 2022-06-29

## 2022-07-01 ENCOUNTER — TELEPHONE (OUTPATIENT)
Dept: SLEEP MEDICINE | Facility: HOSPITAL | Age: 82
End: 2022-07-01

## 2022-07-01 NOTE — TELEPHONE ENCOUNTER
Called pt and went over his SS results.  Pt wants to talk to his wife before making a decision on his treatments options.  He will call back and advise how he wants to proceed.

## 2022-07-20 ENCOUNTER — OFFICE VISIT (OUTPATIENT)
Dept: CARDIOLOGY | Facility: CLINIC | Age: 82
End: 2022-07-20

## 2022-07-20 VITALS
SYSTOLIC BLOOD PRESSURE: 126 MMHG | BODY MASS INDEX: 26.21 KG/M2 | DIASTOLIC BLOOD PRESSURE: 85 MMHG | WEIGHT: 204.2 LBS | HEART RATE: 107 BPM | HEIGHT: 74 IN

## 2022-07-20 DIAGNOSIS — I10 PRIMARY HYPERTENSION: ICD-10-CM

## 2022-07-20 DIAGNOSIS — I48.19 ATRIAL FIBRILLATION, PERSISTENT: Primary | ICD-10-CM

## 2022-07-20 PROCEDURE — 99213 OFFICE O/P EST LOW 20 MIN: CPT | Performed by: NURSE PRACTITIONER

## 2022-07-20 RX ORDER — CARVEDILOL 25 MG/1
25 TABLET ORAL 2 TIMES DAILY
Qty: 180 TABLET | Refills: 3 | Status: SHIPPED | OUTPATIENT
Start: 2022-07-20

## 2022-07-20 NOTE — PROGRESS NOTES
Chief Complaint  Atrial Fibrillation and Hypertension    Subjective            History of Present Illness  Justyn Galo is an 82-year-old white/ male patient who presents to the office today for follow-up.  He has persistent atrial fibrillation and hypertension.  He reports that he continues to have heart racing episodes.  He takes metoprolol 100 mg twice daily.  He tried taking the diltiazem prescribed by Dr. Ortega in the past but had adverse effects with it.  He reports compliance with all of his other medications.  He denies any chest pain, shortness of breath, lightheadedness/dizziness, or edema.    PMH  Past Medical History:   Diagnosis Date   • Atrial fibrillation, persistent  10/14/2021   • BPH (benign prostatic hyperplasia)    • Disease of thyroid gland    • Essential hypertension 10/14/2021   • History of CVA (cerebrovascular accident)    • Paroxysmal atrial fibrillation 10/14/2021         ALLERGY  Allergies   Allergen Reactions   • Loratadine Other (See Comments) and Unknown - High Severity     Nose bleeds          SURGICALHX  Past Surgical History:   Procedure Laterality Date   • LEG SURGERY Right           SOC  Social History     Socioeconomic History   • Marital status:    Tobacco Use   • Smoking status: Former Smoker     Quit date: 10/14/1961     Years since quittin.8   • Smokeless tobacco: Never Used   Vaping Use   • Vaping Use: Never used   Substance and Sexual Activity   • Alcohol use: Never   • Drug use: Never   • Sexual activity: Defer         FAMHX  History reviewed. No pertinent family history.       MEDSIGONLY  Current Outpatient Medications on File Prior to Visit   Medication Sig   • acetaminophen (TYLENOL) 500 MG tablet Take 500 mg by mouth Every 6 (Six) Hours As Needed for Mild Pain .   • Cholecalciferol 125 MCG (5000 UT) tablet    • escitalopram (LEXAPRO) 20 MG tablet    • ezetimibe (ZETIA) 10 MG tablet    • finasteride (PROSCAR) 5 MG tablet TAKE ONE TABLET BY  "MOUTH DAILY   • levothyroxine (SYNTHROID, LEVOTHROID) 50 MCG tablet    • metoprolol succinate XL (TOPROL-XL) 100 MG 24 hr tablet Take 1 tablet by mouth 2 (two) times a day.   • multivitamin with minerals tablet tablet Take 1 tablet by mouth Daily.   • tamsulosin (FLOMAX) 0.4 MG capsule 24 hr capsule TAKE ONE CAPSULE BY MOUTH DAILY 30 MINUTES FOLLOWING THE SAME MEAL EACH DAY   • Xarelto 20 MG tablet TAKE ONE TABLET BY MOUTH DAILY   • [DISCONTINUED] dilTIAZem XR (DILACOR XR) 120 MG 24 hr capsule Take 1 capsule by mouth Daily.     No current facility-administered medications on file prior to visit.         Objective   /85   Pulse 107   Ht 188 cm (74\")   Wt 92.6 kg (204 lb 3.2 oz)   BMI 26.22 kg/m²     Physical Exam  HENT:      Head: Normocephalic.   Neck:      Vascular: No carotid bruit.   Cardiovascular:      Rate and Rhythm: Tachycardia present. Rhythm irregular.      Pulses: Normal pulses.      Heart sounds: Normal heart sounds. No murmur heard.  Pulmonary:      Effort: Pulmonary effort is normal.      Breath sounds: Normal breath sounds.   Musculoskeletal:      Cervical back: Neck supple.      Right lower leg: No edema.      Left lower leg: No edema.   Skin:     General: Skin is dry.      Capillary Refill: Capillary refill takes less than 2 seconds.   Neurological:      Mental Status: He is alert and oriented to person, place, and time.   Psychiatric:         Behavior: Behavior normal.       Result Review :   The following data was reviewed by: KAYLEY Barksdale on 07/20/2022:  No results found for: PROBNP  CMP    CMP 12/31/21 2/17/22   Glucose 96    BUN 24 (A)    Creatinine 1.10 1.10   eGFR Non African Am 64    Sodium 135 (A)    Potassium 4.6    Chloride 94 (A)    Calcium 9.9    Albumin 4.50    Total Bilirubin 1.0    Alkaline Phosphatase 128 (A)    AST (SGOT) 25    ALT (SGPT) 13    (A) Abnormal value       Comments are available for some flowsheets but are not being displayed.           CBC w/diff  "   CBC w/Diff 12/31/21   WBC 7.75   RBC 3.86 (A)   Hemoglobin 13.1   Hematocrit 38.7   .3 (A)   MCH 33.9 (A)   MCHC 33.9   RDW 13.4   Platelets 147   Neutrophil Rel % 70.8   Immature Granulocyte Rel % 0.3   Lymphocyte Rel % 16.8 (A)   Monocyte Rel % 9.9   Eosinophil Rel % 1.9   Basophil Rel % 0.3   (A) Abnormal value             No results found for: TSH   No results found for: FREET4   No results found for: DDIMERQUANT  Magnesium   Date Value Ref Range Status   09/30/2021 1.9 1.6 - 2.4 mg/dL Final      No results found for: DIGOXIN   Lab Results   Component Value Date    TROPONINT <0.010 09/30/2021           Results for orders placed during the hospital encounter of 11/10/21    Adult Transthoracic Echo Complete W/ Cont if Necessary Per Protocol    Interpretation Summary  Fibrocalcific mitral and aortic valves.  Normal left ventricular systolic function.  Biatrial enlargement.  Mild mitral regurgitation.  Moderate tricuspid regurgitation.       Assessment and Plan    Diagnoses and all orders for this visit:    1. Atrial fibrillation, persistent  (Primary)  Change metoprolol to carvedilol 25 mg twice daily. Check blood pressure twice a day for the next two weeks, blood pressure log provided for patient.  Will review log once available to me to make sure that blood pressure and heart rate are well controlled.  Continue Xarelto for CVA prevention.    2. Primary hypertension  Currently controlled, switching medication to carvedilol, continue to monitor blood pressure.    Other orders  -     carvedilol (COREG) 25 MG tablet; Take 1 tablet by mouth 2 (Two) Times a Day.  Dispense: 180 tablet; Refill: 3            Follow Up   Return in about 6 months (around 1/20/2023) for Follow up with Dr Ortega.    Patient was given instructions and counseling regarding his condition or for health maintenance advice. Please see specific information pulled into the AVS if appropriate.     Justyn Galo  reports that he quit smoking  about 60 years ago. He has never used smokeless tobacco.           Peggy Ordonez, KAYLEY  07/20/22  10:15 EDT    Dictated Utilizing Dragon Dictation

## 2022-08-17 ENCOUNTER — TELEPHONE (OUTPATIENT)
Dept: CARDIOLOGY | Facility: CLINIC | Age: 82
End: 2022-08-17

## 2022-08-17 NOTE — TELEPHONE ENCOUNTER
Heart rate is improved and blood pressures are varied but show pretty good control, continue current meds   room air

## 2022-08-18 NOTE — TELEPHONE ENCOUNTER
I called patient again but he was unavailable. I left a detailed message with this information and a call back number.

## 2022-10-28 RX ORDER — RIVAROXABAN 20 MG/1
TABLET, FILM COATED ORAL
Qty: 90 TABLET | Refills: 1 | Status: SHIPPED | OUTPATIENT
Start: 2022-10-28

## 2022-11-28 ENCOUNTER — TRANSCRIBE ORDERS (OUTPATIENT)
Dept: ADMINISTRATIVE | Facility: HOSPITAL | Age: 82
End: 2022-11-28

## 2022-11-28 DIAGNOSIS — J34.89 NASAL OBSTRUCTION: Primary | ICD-10-CM

## 2022-11-28 DIAGNOSIS — I69.391 DYSPHAGIA DUE TO OLD STROKE: ICD-10-CM

## 2022-12-29 ENCOUNTER — APPOINTMENT (OUTPATIENT)
Dept: GENERAL RADIOLOGY | Facility: HOSPITAL | Age: 82
End: 2022-12-29
Payer: MEDICARE

## 2022-12-29 ENCOUNTER — HOSPITAL ENCOUNTER (INPATIENT)
Facility: HOSPITAL | Age: 82
LOS: 11 days | Discharge: HOME | End: 2023-01-09
Attending: EMERGENCY MEDICINE | Admitting: STUDENT IN AN ORGANIZED HEALTH CARE EDUCATION/TRAINING PROGRAM
Payer: MEDICARE

## 2022-12-29 DIAGNOSIS — R26.2 DIFFICULTY WALKING: ICD-10-CM

## 2022-12-29 DIAGNOSIS — R13.12 OROPHARYNGEAL DYSPHAGIA: ICD-10-CM

## 2022-12-29 DIAGNOSIS — Z86.73 HISTORY OF CVA (CEREBROVASCULAR ACCIDENT): ICD-10-CM

## 2022-12-29 DIAGNOSIS — R53.1 GENERALIZED WEAKNESS: ICD-10-CM

## 2022-12-29 DIAGNOSIS — I48.91 ATRIAL FIBRILLATION WITH RAPID VENTRICULAR RESPONSE: Primary | ICD-10-CM

## 2022-12-29 LAB
ALBUMIN SERPL-MCNC: 4 G/DL (ref 3.5–5.2)
ALBUMIN/GLOB SERPL: 1.4 G/DL
ALP SERPL-CCNC: 111 U/L (ref 39–117)
ALT SERPL W P-5'-P-CCNC: 15 U/L (ref 1–41)
ANION GAP SERPL CALCULATED.3IONS-SCNC: 11 MMOL/L (ref 5–15)
ANION GAP SERPL CALCULATED.3IONS-SCNC: 8.7 MMOL/L (ref 5–15)
AST SERPL-CCNC: 25 U/L (ref 1–40)
BASOPHILS # BLD AUTO: 0.01 10*3/MM3 (ref 0–0.2)
BASOPHILS # BLD AUTO: 0.01 10*3/MM3 (ref 0–0.2)
BASOPHILS NFR BLD AUTO: 0.2 % (ref 0–1.5)
BASOPHILS NFR BLD AUTO: 0.2 % (ref 0–1.5)
BILIRUB SERPL-MCNC: 0.9 MG/DL (ref 0–1.2)
BILIRUB UR QL STRIP: NEGATIVE
BUN SERPL-MCNC: 20 MG/DL (ref 8–23)
BUN SERPL-MCNC: 20 MG/DL (ref 8–23)
BUN/CREAT SERPL: 19.6 (ref 7–25)
BUN/CREAT SERPL: 21.3 (ref 7–25)
CALCIUM SPEC-SCNC: 9.2 MG/DL (ref 8.6–10.5)
CALCIUM SPEC-SCNC: 9.4 MG/DL (ref 8.6–10.5)
CHLORIDE SERPL-SCNC: 101 MMOL/L (ref 98–107)
CHLORIDE SERPL-SCNC: 101 MMOL/L (ref 98–107)
CLARITY UR: CLEAR
CO2 SERPL-SCNC: 25 MMOL/L (ref 22–29)
CO2 SERPL-SCNC: 25.3 MMOL/L (ref 22–29)
COLOR UR: YELLOW
CREAT SERPL-MCNC: 0.94 MG/DL (ref 0.76–1.27)
CREAT SERPL-MCNC: 1.02 MG/DL (ref 0.76–1.27)
DEPRECATED RDW RBC AUTO: 48.5 FL (ref 37–54)
DEPRECATED RDW RBC AUTO: 49.3 FL (ref 37–54)
EGFRCR SERPLBLD CKD-EPI 2021: 73.8 ML/MIN/1.73
EGFRCR SERPLBLD CKD-EPI 2021: 81.4 ML/MIN/1.73
EOSINOPHIL # BLD AUTO: 0.01 10*3/MM3 (ref 0–0.4)
EOSINOPHIL # BLD AUTO: 0.03 10*3/MM3 (ref 0–0.4)
EOSINOPHIL NFR BLD AUTO: 0.2 % (ref 0.3–6.2)
EOSINOPHIL NFR BLD AUTO: 0.6 % (ref 0.3–6.2)
ERYTHROCYTE [DISTWIDTH] IN BLOOD BY AUTOMATED COUNT: 13.3 % (ref 12.3–15.4)
ERYTHROCYTE [DISTWIDTH] IN BLOOD BY AUTOMATED COUNT: 13.4 % (ref 12.3–15.4)
GLOBULIN UR ELPH-MCNC: 2.9 GM/DL
GLUCOSE BLDC GLUCOMTR-MCNC: 128 MG/DL (ref 70–99)
GLUCOSE SERPL-MCNC: 127 MG/DL (ref 65–99)
GLUCOSE SERPL-MCNC: 140 MG/DL (ref 65–99)
GLUCOSE UR STRIP-MCNC: NEGATIVE MG/DL
HCT VFR BLD AUTO: 33.9 % (ref 37.5–51)
HCT VFR BLD AUTO: 35.7 % (ref 37.5–51)
HGB BLD-MCNC: 11.6 G/DL (ref 13–17.7)
HGB BLD-MCNC: 12 G/DL (ref 13–17.7)
HGB UR QL STRIP.AUTO: NEGATIVE
HOLD SPECIMEN: NORMAL
HOLD SPECIMEN: NORMAL
IMM GRANULOCYTES # BLD AUTO: 0.01 10*3/MM3 (ref 0–0.05)
IMM GRANULOCYTES # BLD AUTO: 0.02 10*3/MM3 (ref 0–0.05)
IMM GRANULOCYTES NFR BLD AUTO: 0.2 % (ref 0–0.5)
IMM GRANULOCYTES NFR BLD AUTO: 0.4 % (ref 0–0.5)
KETONES UR QL STRIP: NEGATIVE
LEUKOCYTE ESTERASE UR QL STRIP.AUTO: NEGATIVE
LYMPHOCYTES # BLD AUTO: 0.48 10*3/MM3 (ref 0.7–3.1)
LYMPHOCYTES # BLD AUTO: 0.58 10*3/MM3 (ref 0.7–3.1)
LYMPHOCYTES NFR BLD AUTO: 11.8 % (ref 19.6–45.3)
LYMPHOCYTES NFR BLD AUTO: 8.8 % (ref 19.6–45.3)
MAGNESIUM SERPL-MCNC: 1.7 MG/DL (ref 1.6–2.4)
MAGNESIUM SERPL-MCNC: 1.7 MG/DL (ref 1.6–2.4)
MCH RBC QN AUTO: 33.4 PG (ref 26.6–33)
MCH RBC QN AUTO: 34.1 PG (ref 26.6–33)
MCHC RBC AUTO-ENTMCNC: 33.6 G/DL (ref 31.5–35.7)
MCHC RBC AUTO-ENTMCNC: 34.2 G/DL (ref 31.5–35.7)
MCV RBC AUTO: 99.4 FL (ref 79–97)
MCV RBC AUTO: 99.7 FL (ref 79–97)
MONOCYTES # BLD AUTO: 0.3 10*3/MM3 (ref 0.1–0.9)
MONOCYTES # BLD AUTO: 0.4 10*3/MM3 (ref 0.1–0.9)
MONOCYTES NFR BLD AUTO: 5.5 % (ref 5–12)
MONOCYTES NFR BLD AUTO: 8.1 % (ref 5–12)
NEUTROPHILS NFR BLD AUTO: 3.89 10*3/MM3 (ref 1.7–7)
NEUTROPHILS NFR BLD AUTO: 4.66 10*3/MM3 (ref 1.7–7)
NEUTROPHILS NFR BLD AUTO: 79.1 % (ref 42.7–76)
NEUTROPHILS NFR BLD AUTO: 84.9 % (ref 42.7–76)
NITRITE UR QL STRIP: NEGATIVE
NRBC BLD AUTO-RTO: 0 /100 WBC (ref 0–0.2)
NRBC BLD AUTO-RTO: 0 /100 WBC (ref 0–0.2)
PH UR STRIP.AUTO: 6 [PH] (ref 5–8)
PHOSPHATE SERPL-MCNC: 2.8 MG/DL (ref 2.5–4.5)
PLATELET # BLD AUTO: 116 10*3/MM3 (ref 140–450)
PLATELET # BLD AUTO: 118 10*3/MM3 (ref 140–450)
PMV BLD AUTO: 9 FL (ref 6–12)
PMV BLD AUTO: 9.2 FL (ref 6–12)
POTASSIUM SERPL-SCNC: 3.9 MMOL/L (ref 3.5–5.2)
POTASSIUM SERPL-SCNC: 4 MMOL/L (ref 3.5–5.2)
PROT SERPL-MCNC: 6.9 G/DL (ref 6–8.5)
PROT UR QL STRIP: ABNORMAL
RBC # BLD AUTO: 3.4 10*6/MM3 (ref 4.14–5.8)
RBC # BLD AUTO: 3.59 10*6/MM3 (ref 4.14–5.8)
SODIUM SERPL-SCNC: 135 MMOL/L (ref 136–145)
SODIUM SERPL-SCNC: 137 MMOL/L (ref 136–145)
SP GR UR STRIP: 1.02 (ref 1–1.03)
TROPONIN T SERPL-MCNC: <0.01 NG/ML (ref 0–0.03)
TSH SERPL DL<=0.05 MIU/L-ACNC: 1.83 UIU/ML (ref 0.27–4.2)
UROBILINOGEN UR QL STRIP: ABNORMAL
WBC NRBC COR # BLD: 4.92 10*3/MM3 (ref 3.4–10.8)
WBC NRBC COR # BLD: 5.48 10*3/MM3 (ref 3.4–10.8)
WHOLE BLOOD HOLD COAG: NORMAL
WHOLE BLOOD HOLD COAG: NORMAL
WHOLE BLOOD HOLD SPECIMEN: NORMAL

## 2022-12-29 PROCEDURE — 85025 COMPLETE CBC W/AUTO DIFF WBC: CPT | Performed by: EMERGENCY MEDICINE

## 2022-12-29 PROCEDURE — 83735 ASSAY OF MAGNESIUM: CPT | Performed by: STUDENT IN AN ORGANIZED HEALTH CARE EDUCATION/TRAINING PROGRAM

## 2022-12-29 PROCEDURE — 85025 COMPLETE CBC W/AUTO DIFF WBC: CPT | Performed by: STUDENT IN AN ORGANIZED HEALTH CARE EDUCATION/TRAINING PROGRAM

## 2022-12-29 PROCEDURE — 93010 ELECTROCARDIOGRAM REPORT: CPT | Performed by: INTERNAL MEDICINE

## 2022-12-29 PROCEDURE — 93005 ELECTROCARDIOGRAM TRACING: CPT | Performed by: EMERGENCY MEDICINE

## 2022-12-29 PROCEDURE — 99285 EMERGENCY DEPT VISIT HI MDM: CPT

## 2022-12-29 PROCEDURE — 84443 ASSAY THYROID STIM HORMONE: CPT | Performed by: STUDENT IN AN ORGANIZED HEALTH CARE EDUCATION/TRAINING PROGRAM

## 2022-12-29 PROCEDURE — 82962 GLUCOSE BLOOD TEST: CPT

## 2022-12-29 PROCEDURE — 83735 ASSAY OF MAGNESIUM: CPT | Performed by: EMERGENCY MEDICINE

## 2022-12-29 PROCEDURE — 84484 ASSAY OF TROPONIN QUANT: CPT | Performed by: EMERGENCY MEDICINE

## 2022-12-29 PROCEDURE — 80053 COMPREHEN METABOLIC PANEL: CPT | Performed by: EMERGENCY MEDICINE

## 2022-12-29 PROCEDURE — 93005 ELECTROCARDIOGRAM TRACING: CPT

## 2022-12-29 PROCEDURE — 81003 URINALYSIS AUTO W/O SCOPE: CPT | Performed by: EMERGENCY MEDICINE

## 2022-12-29 PROCEDURE — 92610 EVALUATE SWALLOWING FUNCTION: CPT

## 2022-12-29 PROCEDURE — 84100 ASSAY OF PHOSPHORUS: CPT | Performed by: STUDENT IN AN ORGANIZED HEALTH CARE EDUCATION/TRAINING PROGRAM

## 2022-12-29 PROCEDURE — 71045 X-RAY EXAM CHEST 1 VIEW: CPT

## 2022-12-29 RX ORDER — HYDROCODONE BITARTRATE AND ACETAMINOPHEN 5; 325 MG/1; MG/1
1 TABLET ORAL EVERY 4 HOURS PRN
Status: ACTIVE | OUTPATIENT
Start: 2022-12-29 | End: 2023-01-05

## 2022-12-29 RX ORDER — ONDANSETRON 2 MG/ML
4 INJECTION INTRAMUSCULAR; INTRAVENOUS EVERY 6 HOURS PRN
Status: DISCONTINUED | OUTPATIENT
Start: 2022-12-29 | End: 2023-01-10 | Stop reason: HOSPADM

## 2022-12-29 RX ORDER — NALOXONE HCL 0.4 MG/ML
0.4 VIAL (ML) INJECTION
Status: DISCONTINUED | OUTPATIENT
Start: 2022-12-29 | End: 2023-01-10 | Stop reason: HOSPADM

## 2022-12-29 RX ORDER — SODIUM CHLORIDE 0.9 % (FLUSH) 0.9 %
10 SYRINGE (ML) INJECTION AS NEEDED
Status: DISCONTINUED | OUTPATIENT
Start: 2022-12-29 | End: 2023-01-10 | Stop reason: HOSPADM

## 2022-12-29 RX ORDER — ACETAMINOPHEN 160 MG/5ML
650 SOLUTION ORAL EVERY 4 HOURS PRN
Status: DISCONTINUED | OUTPATIENT
Start: 2022-12-29 | End: 2023-01-10 | Stop reason: HOSPADM

## 2022-12-29 RX ORDER — ESCITALOPRAM OXALATE 10 MG/1
20 TABLET ORAL DAILY
Status: DISCONTINUED | OUTPATIENT
Start: 2022-12-29 | End: 2023-01-10 | Stop reason: HOSPADM

## 2022-12-29 RX ORDER — CHOLECALCIFEROL (VITAMIN D3) 125 MCG
5 CAPSULE ORAL NIGHTLY PRN
Status: DISCONTINUED | OUTPATIENT
Start: 2022-12-29 | End: 2023-01-10 | Stop reason: HOSPADM

## 2022-12-29 RX ORDER — FINASTERIDE 5 MG/1
5 TABLET, FILM COATED ORAL DAILY
Status: DISCONTINUED | OUTPATIENT
Start: 2022-12-29 | End: 2023-01-10 | Stop reason: HOSPADM

## 2022-12-29 RX ORDER — TAMSULOSIN HYDROCHLORIDE 0.4 MG/1
0.4 CAPSULE ORAL
Status: DISCONTINUED | OUTPATIENT
Start: 2022-12-30 | End: 2023-01-10 | Stop reason: HOSPADM

## 2022-12-29 RX ORDER — FAMOTIDINE 20 MG/1
40 TABLET, FILM COATED ORAL DAILY
Status: DISCONTINUED | OUTPATIENT
Start: 2022-12-29 | End: 2023-01-10 | Stop reason: HOSPADM

## 2022-12-29 RX ORDER — ALUMINA, MAGNESIA, AND SIMETHICONE 2400; 2400; 240 MG/30ML; MG/30ML; MG/30ML
15 SUSPENSION ORAL EVERY 6 HOURS PRN
Status: DISCONTINUED | OUTPATIENT
Start: 2022-12-29 | End: 2023-01-10 | Stop reason: HOSPADM

## 2022-12-29 RX ORDER — CARVEDILOL 25 MG/1
25 TABLET ORAL 2 TIMES DAILY
Status: DISCONTINUED | OUTPATIENT
Start: 2022-12-29 | End: 2023-01-10 | Stop reason: HOSPADM

## 2022-12-29 RX ORDER — SODIUM CHLORIDE 0.9 % (FLUSH) 0.9 %
10 SYRINGE (ML) INJECTION EVERY 12 HOURS SCHEDULED
Status: DISCONTINUED | OUTPATIENT
Start: 2022-12-29 | End: 2023-01-10 | Stop reason: HOSPADM

## 2022-12-29 RX ORDER — SODIUM CHLORIDE 9 MG/ML
40 INJECTION, SOLUTION INTRAVENOUS AS NEEDED
Status: DISCONTINUED | OUTPATIENT
Start: 2022-12-29 | End: 2023-01-10 | Stop reason: HOSPADM

## 2022-12-29 RX ORDER — DILTIAZEM HCL IN NACL,ISO-OSM 125 MG/125
5-15 PLASTIC BAG, INJECTION (ML) INTRAVENOUS
Status: DISCONTINUED | OUTPATIENT
Start: 2022-12-29 | End: 2022-12-30

## 2022-12-29 RX ORDER — ENOXAPARIN SODIUM 100 MG/ML
40 INJECTION SUBCUTANEOUS EVERY 24 HOURS
Status: DISCONTINUED | OUTPATIENT
Start: 2022-12-29 | End: 2022-12-29

## 2022-12-29 RX ORDER — ACETAMINOPHEN 650 MG/1
650 SUPPOSITORY RECTAL EVERY 4 HOURS PRN
Status: DISCONTINUED | OUTPATIENT
Start: 2022-12-29 | End: 2023-01-10 | Stop reason: HOSPADM

## 2022-12-29 RX ORDER — POLYETHYLENE GLYCOL 3350 17 G/17G
17 POWDER, FOR SOLUTION ORAL DAILY PRN
Status: DISCONTINUED | OUTPATIENT
Start: 2022-12-29 | End: 2023-01-10 | Stop reason: HOSPADM

## 2022-12-29 RX ORDER — BISACODYL 5 MG/1
5 TABLET, DELAYED RELEASE ORAL DAILY PRN
Status: DISCONTINUED | OUTPATIENT
Start: 2022-12-29 | End: 2023-01-10 | Stop reason: HOSPADM

## 2022-12-29 RX ORDER — LEVOTHYROXINE SODIUM 0.05 MG/1
50 TABLET ORAL
Status: DISCONTINUED | OUTPATIENT
Start: 2022-12-29 | End: 2023-01-10 | Stop reason: HOSPADM

## 2022-12-29 RX ORDER — BISACODYL 10 MG
10 SUPPOSITORY, RECTAL RECTAL DAILY PRN
Status: DISCONTINUED | OUTPATIENT
Start: 2022-12-29 | End: 2023-01-10 | Stop reason: HOSPADM

## 2022-12-29 RX ORDER — FERROUS SULFATE 325(65) MG
325 TABLET ORAL DAILY
Status: DISCONTINUED | OUTPATIENT
Start: 2022-12-29 | End: 2023-01-03

## 2022-12-29 RX ORDER — HYDROCODONE BITARTRATE AND ACETAMINOPHEN 10; 325 MG/1; MG/1
1 TABLET ORAL EVERY 4 HOURS PRN
Status: ACTIVE | OUTPATIENT
Start: 2022-12-29 | End: 2023-01-05

## 2022-12-29 RX ORDER — ACETAMINOPHEN 325 MG/1
650 TABLET ORAL EVERY 4 HOURS PRN
Status: DISCONTINUED | OUTPATIENT
Start: 2022-12-29 | End: 2023-01-10 | Stop reason: HOSPADM

## 2022-12-29 RX ORDER — FERROUS SULFATE 325(65) MG
325 TABLET ORAL DAILY
COMMUNITY

## 2022-12-29 RX ORDER — DILTIAZEM HYDROCHLORIDE 5 MG/ML
10 INJECTION INTRAVENOUS ONCE
Status: COMPLETED | OUTPATIENT
Start: 2022-12-29 | End: 2022-12-29

## 2022-12-29 RX ORDER — TAMSULOSIN HYDROCHLORIDE 0.4 MG/1
0.4 CAPSULE ORAL DAILY
Status: DISCONTINUED | OUTPATIENT
Start: 2022-12-29 | End: 2022-12-29

## 2022-12-29 RX ORDER — AMOXICILLIN 250 MG
2 CAPSULE ORAL 2 TIMES DAILY
Status: DISCONTINUED | OUTPATIENT
Start: 2022-12-29 | End: 2023-01-10 | Stop reason: HOSPADM

## 2022-12-29 RX ORDER — ALPRAZOLAM 0.25 MG/1
0.5 TABLET ORAL EVERY 8 HOURS PRN
Status: DISPENSED | OUTPATIENT
Start: 2022-12-29 | End: 2023-01-05

## 2022-12-29 RX ADMIN — DILTIAZEM HYDROCHLORIDE 10 MG: 5 INJECTION INTRAVENOUS at 02:24

## 2022-12-29 RX ADMIN — FAMOTIDINE 40 MG: 20 TABLET, FILM COATED ORAL at 11:20

## 2022-12-29 RX ADMIN — TAMSULOSIN HYDROCHLORIDE 0.4 MG: 0.4 CAPSULE ORAL at 11:19

## 2022-12-29 RX ADMIN — LEVOTHYROXINE SODIUM 50 MCG: 25 TABLET ORAL at 11:20

## 2022-12-29 RX ADMIN — Medication 10 ML: at 20:57

## 2022-12-29 RX ADMIN — RIVAROXABAN 20 MG: 20 TABLET, FILM COATED ORAL at 19:12

## 2022-12-29 RX ADMIN — Medication 10 ML: at 20:58

## 2022-12-29 RX ADMIN — DILTIAZEM HYDROCHLORIDE 5 MG/HR: 5 INJECTION, SOLUTION INTRAVENOUS at 03:35

## 2022-12-29 RX ADMIN — CARVEDILOL 25 MG: 25 TABLET, FILM COATED ORAL at 11:25

## 2022-12-29 RX ADMIN — ESCITALOPRAM OXALATE 20 MG: 10 TABLET ORAL at 11:20

## 2022-12-29 RX ADMIN — Medication 10 ML: at 15:30

## 2022-12-29 RX ADMIN — FERROUS SULFATE TAB 325 MG (65 MG ELEMENTAL FE) 325 MG: 325 (65 FE) TAB at 11:20

## 2022-12-29 RX ADMIN — SENNOSIDES AND DOCUSATE SODIUM 2 TABLET: 8.6; 5 TABLET ORAL at 20:54

## 2022-12-29 RX ADMIN — FINASTERIDE 5 MG: 5 TABLET, FILM COATED ORAL at 11:19

## 2022-12-29 RX ADMIN — CARVEDILOL 25 MG: 25 TABLET, FILM COATED ORAL at 20:54

## 2022-12-29 NOTE — H&P
[facility]   HISTORY AND PHYSICAL    Patient Name: Justyn Galo  : 1940  MRN: [id number]  Primary Care Physician:  Martin Monahan MD  Date of admission: 2022    Subjective   Subjective     Chief Complaint: Weakness and dizziness    HPI:    Justyn Galo is a 82 y.o. male with past medical history of hypothyroidism, chronic A. fib on anticoagulation, CVA with left-sided residual weakness, anxiety/depression, BPH who came in due to ongoing weakness and dizziness for the last few days which has been worse compared to before.  He had difficulty getting up from his toilet seat and needed help from his neighbor.  During that time he states that he felt like things were spinning around and was nauseous.  Patient was noted to be in A. fib with RVR.  He was started on Cardizem drip in the ER and was quickly weaned off by the morning.  He spontaneously reverted back labs have otherwise been unremarkable.    Review of Systems  Constitutional:        Weakness tiredness fatigue  Eyes:                       No blurry vision, eye discharge, eye irritation, eye pain  HEENT:                   No acute hair loss, earache and discharge, nasal congestion or discharge, sore throat, postnasal drip  Respiratory:           No shortness of breath coughing sputum production wheezing hemoptysis pleuritic chest pain  Cardiovascular:     No chest pain, orthopnea, PND, dizziness, palpitation, lower extremity edema  Gastrointestinal:   No nausea vomiting diarrhea abdominal pain constipation  Genitourinary:       No urinary incontinence, hesitancy, frequency, urgency, dysuria  Neurological:        No confusion, headache, focal weakness, numbness, dysphasia  Hematologic:         No bruising, bleeding, pallor, lymphadenopathy  Endocrine:            No coldness, hot flashes, polyuria, abnormal hair growth  Musculoskeletal:    No body pains, aches, arthritic pains, muscle pain ,muscle wasting  Psychiatric:          No  "low or high mood, anxiety, hallucinations, delusions  Skin.                      No rash, ulcers, bruising, itching    Personal History     Past Medical History:   Diagnosis Date   • Atrial fibrillation, persistent  10/14/2021   • BPH (benign prostatic hyperplasia)    • Disease of thyroid gland    • Essential hypertension 10/14/2021   • Falls frequently     SPOUSE REPORTS MULTIPLE FALLS \"EVERY WEEK. I'VE LOST COUNT.\"   • Generalized weakness    • History of CVA (cerebrovascular accident) 2012    RESIDUAL OF LEFT SIDE WEAKNESS   • Paroxysmal atrial fibrillation 10/14/2021   • Runny nose     CHRONIC RUNNY NOSE REPORTED PER SPOUSE.   • Urgency of urination     WEARS BRIEFS AT HOME       Past Surgical History:   Procedure Laterality Date   • LEG SURGERY Right        Family History: family history is not on file. Otherwise pertinent FHx was reviewed and not pertinent to current issue.    Social History:  reports that he quit smoking about 61 years ago. His smoking use included cigarettes. He has never used smokeless tobacco. He reports that he does not drink alcohol and does not use drugs.    Home Medications:  Cholecalciferol, acetaminophen, carvedilol, escitalopram, ezetimibe, ferrous sulfate, finasteride, levothyroxine, multivitamin with minerals, rivaroxaban, and tamsulosin      Allergies:  Allergies   Allergen Reactions   • Loratadine Other (See Comments) and Unknown - Low Severity     PATIENT & SPOUSE SAID IF HE TAKES IT TO LONG (OR TO MANY DAYS IN A ROW) IT DRIES HIM OUT & MAKES HIS NOSE BLEED.       Objective   Objective     Vitals:   Temp:  [97.2 °F (36.2 °C)-98.5 °F (36.9 °C)] 98.5 °F (36.9 °C)  Heart Rate:  [57-98] 98  Resp:  [17-20] 18  BP: ()/() 161/111  Physical Exam               Constitutional:         Awake, alert responsive, conversant, no obvious distress   Eyes:                       PERRLA, sclerae anicteric, no conjunctival injection   HEENT:                   Moist mucous membranes, no " nasal or eye discharge, no throat congestion   Neck:                      Supple, no thyromegaly, no lymphadenopathy, trachea midline, no elevated JVD   Respiratory:           Clear to auscultation bilaterally, nonlabored respirations    Cardiovascular:     RRR, no murmurs, rubs, or gallops, palpable pedal pulses bilaterally,No bilateral ankle edema   Gastrointestinal:   Positive bowel sounds, soft, nontender, nondistended, no organomegaly   Musculoskeletal:   No clubbing or cyanosis to extremities, muscle wasting, joint swelling, muscle weakness   Psychiatric:             Appropriate affect, cooperative   Neurologic:            Awake alert ,oriented x 3, strength symmetric in all extremities, Cranial Nerves grossly intact to confrontation, speech clear   Skin:                      No rashes, bruising, skin ulcers, petechiae or ecchymosis    Result Review    Result Review:  I have personally reviewed the results from the time of this admission to 12/30/2022 09:58 EST and agree with these findings:  [x]  Laboratory  [x]  Microbiology  [x]  Radiology  [x]  EKG/Telemetry   [x]  Cardiology/Vascular   [x]  Pathology  [x]  Old records  []  Other:    Assessment & Plan   Assessment / Plan     Active Hospital Problems:  Active Hospital Problems    Diagnosis    • **Atrial fibrillation with rapid ventricular response (HCC)    • Hypertension    • Atrial fibrillation, persistent     • Hypothyroidism    • History of CVA (cerebrovascular accident)    82 y.o. male with past medical history of hypothyroidism, chronic A. fib on anticoagulation, CVA with left-sided residual weakness, anxiety/depression, BPH who came in due to ongoing weakness and dizziness for the last few days noted to be with A. fib with RVR.  It resolved quickly with diltiazem    Plan:   Continue diltiazem wean down  PT eval  Carvedilol 25 twice daily  Continue levothyroxine 50.  TSH was normal  Continue Xarelto 20 daily, tamsulosin 0.4 daily and finasteride 5  daily and escitalopram 20 daily  Will hold off cardiology consult at this time    DVT prophylaxis:  Medical DVT prophylaxis orders are present.    CODE STATUS:    Code Status (Patient has no pulse and is not breathing): CPR (Attempt to Resuscitate)  Medical Interventions (Patient has pulse or is breathing): Full Support    Admission Status:  I believe this patient meets obs status.    Electronically signed by Vazquez Fermin MD, 12/29/22, 8:28 AM EST.

## 2022-12-29 NOTE — ED PROVIDER NOTES
"Time: 1:33 AM EST    Chief Complaint: generalized weakness, dizziness  History provided by: patient  History is limited by: N/A     History of Present Illness:  Patient is a 82 y.o. year old male who presents to the emergency department with generalized weakness and dizziness. Pt reports he has been experiencing generalized weakness and dizziness for the past few days. Wife reports the they needed help from a neighbor today to get the pt off of the toilet. He states he feels like things are \"spinning.\" He reports he was feeling nauseous earlier, but he denies nausea now. Wife at bedside notes the pt's cardiologist is Dr. Ortega.      HPI    Similar Symptoms Previously: no  Recently seen: no      Patient Care Team  Primary Care Provider: Martin Monahan MD    Past Medical History:     Allergies   Allergen Reactions   • Loratadine Other (See Comments) and Unknown - High Severity     Nose bleeds     Past Medical History:   Diagnosis Date   • Atrial fibrillation, persistent  10/14/2021   • BPH (benign prostatic hyperplasia)    • Disease of thyroid gland    • Essential hypertension 10/14/2021   • History of CVA (cerebrovascular accident) 2012   • Paroxysmal atrial fibrillation 10/14/2021     Past Surgical History:   Procedure Laterality Date   • LEG SURGERY Right      History reviewed. No pertinent family history.    Home Medications:  Prior to Admission medications    Medication Sig Start Date End Date Taking? Authorizing Provider   acetaminophen (TYLENOL) 500 MG tablet Take 500 mg by mouth Every 6 (Six) Hours As Needed for Mild Pain .    Provider, MD Maribel   carvedilol (COREG) 25 MG tablet Take 1 tablet by mouth 2 (Two) Times a Day. 7/20/22   Peggy Ordonez APRN   Cholecalciferol 125 MCG (5000 UT) tablet     Emergency, Nurse Tania RN   escitalopram (LEXAPRO) 20 MG tablet  7/20/21   Emergency, Nurse Tania RN   ezetimibe (ZETIA) 10 MG tablet  7/20/21   Emergency, Nurse Tania RN   finasteride (PROSCAR) 5 " "MG tablet TAKE ONE TABLET BY MOUTH DAILY 22   Lukas Bran MD   levothyroxine (SYNTHROID, LEVOTHROID) 50 MCG tablet  21   Emergency, Nurse Tania RN   multivitamin with minerals tablet tablet Take 1 tablet by mouth Daily.    Emergency, Nurse Tania RN   tamsulosin (FLOMAX) 0.4 MG capsule 24 hr capsule TAKE ONE CAPSULE BY MOUTH DAILY 30 MINUTES FOLLOWING THE SAME MEAL EACH DAY 22   Lukas Bran MD   Xarelto 20 MG tablet TAKE ONE TABLET BY MOUTH DAILY 10/28/22   Saqib Ortega MD        Social History:   Social History     Tobacco Use   • Smoking status: Former     Types: Cigarettes     Quit date: 10/14/1961     Years since quittin.2   • Smokeless tobacco: Never   Vaping Use   • Vaping Use: Never used   Substance Use Topics   • Alcohol use: Never   • Drug use: Never         Review of Systems:  Review of Systems   Constitutional: Negative for chills and fever.   HENT: Negative for sore throat.    Eyes: Negative for photophobia.   Respiratory: Negative for shortness of breath.    Cardiovascular: Negative for chest pain.   Gastrointestinal: Positive for nausea. Negative for abdominal pain, diarrhea and vomiting.   Genitourinary: Negative for dysuria.   Musculoskeletal: Negative for neck pain.   Skin: Negative for wound.   Neurological: Positive for dizziness (vertiginous) and weakness (generalized). Negative for headaches.   All other systems reviewed and are negative.       Physical Exam:  /86   Pulse 105   Temp 99 °F (37.2 °C) (Oral)   Resp 14   Ht 185.4 cm (73\")   Wt 93.9 kg (207 lb)   SpO2 91%   BMI 27.31 kg/m²     Physical Exam  Vitals and nursing note reviewed.   Constitutional:       General: He is not in acute distress.  HENT:      Head: Normocephalic and atraumatic.   Eyes:      Extraocular Movements: Extraocular movements intact.   Cardiovascular:      Rate and Rhythm: Tachycardia present. Rhythm irregularly irregular.      Heart sounds: Normal heart sounds. "   Pulmonary:      Effort: Pulmonary effort is normal. No respiratory distress.      Breath sounds: Normal breath sounds.   Abdominal:      General: Abdomen is flat. Bowel sounds are normal.      Palpations: Abdomen is soft.      Tenderness: There is no abdominal tenderness.   Musculoskeletal:         General: Normal range of motion.      Cervical back: Normal range of motion and neck supple.      Right lower leg: No edema.      Left lower leg: No edema.   Skin:     General: Skin is warm and dry.      Capillary Refill: Capillary refill takes less than 2 seconds.   Neurological:      Mental Status: He is alert and oriented to person, place, and time. Mental status is at baseline.                Medications in the Emergency Department:  Medications   sodium chloride 0.9 % flush 10 mL (has no administration in time range)   dilTIAZem (CARDIZEM) 125 mg in 125 mL sodium chloride  infusion (5 mg/hr Intravenous New Bag 12/29/22 0335)   dilTIAZem (CARDIZEM) injection 10 mg (10 mg Intravenous Given 12/29/22 0224)        Labs  Lab Results (last 24 hours)     Procedure Component Value Units Date/Time    POC Glucose Once [[id number]]  (Abnormal) Collected: 12/29/22 0046    Specimen: Blood Updated: 12/29/22 0053     Glucose 128 mg/dL      Comment: Serial Number: [id number]:  [id number]       CBC & Differential [[id number]]  (Abnormal) Collected: 12/29/22 0054    Specimen: Blood Updated: 12/29/22 0100    Narrative:      The following orders were created for panel order CBC & Differential.  Procedure                               Abnormality         Status                     ---------                               -----------         ------                     CBC Auto Differential[[id number]]        Abnormal            Final result                 Please view results for these tests on the individual orders.    Comprehensive Metabolic Panel [[id number]]  (Abnormal) Collected: 12/29/22 0054    Specimen: Blood Updated: 12/29/22  0126     Glucose 127 mg/dL      BUN 20 mg/dL      Creatinine 1.02 mg/dL      Sodium 137 mmol/L      Potassium 4.0 mmol/L      Chloride 101 mmol/L      CO2 25.0 mmol/L      Calcium 9.4 mg/dL      Total Protein 6.9 g/dL      Albumin 4.0 g/dL      ALT (SGPT) 15 U/L      AST (SGOT) 25 U/L      Alkaline Phosphatase 111 U/L      Total Bilirubin 0.9 mg/dL      Globulin 2.9 gm/dL      A/G Ratio 1.4 g/dL      BUN/Creatinine Ratio 19.6     Anion Gap 11.0 mmol/L      eGFR 73.8 mL/min/1.73      Comment: National Kidney Foundation and American Society of Nephrology (ASN) Task Force recommended calculation based on the Chronic Kidney Disease Epidemiology Collaboration (CKD-EPI) equation refit without adjustment for race.       Narrative:      GFR Normal >60  Chronic Kidney Disease <60  Kidney Failure <15    The GFR formula is only valid for adults with stable renal function between ages 18 and 70.    Troponin [[id number]]  (Normal) Collected: 12/29/22 0054    Specimen: Blood Updated: 12/29/22 0125     Troponin T <0.010 ng/mL     Narrative:      Troponin T Reference Range:  <= 0.03 ng/mL-   Negative for AMI  >0.03 ng/mL-     Abnormal for myocardial necrosis.  Clinicians would have to utilize clinical acumen, EKG, Troponin and serial changes to determine if it is an Acute Myocardial Infarction or myocardial injury due to an underlying chronic condition.       Results may be falsely decreased if patient taking Biotin.      Magnesium [[id number]]  (Normal) Collected: 12/29/22 0054    Specimen: Blood Updated: 12/29/22 0126     Magnesium 1.7 mg/dL     CBC Auto Differential [[id number]]  (Abnormal) Collected: 12/29/22 0054    Specimen: Blood Updated: 12/29/22 0100     WBC 5.48 10*3/mm3      RBC 3.59 10*6/mm3      Hemoglobin 12.0 g/dL      Hematocrit 35.7 %      MCV 99.4 fL      MCH 33.4 pg      MCHC 33.6 g/dL      RDW 13.3 %      RDW-SD 48.5 fl      MPV 9.2 fL      Platelets 118 10*3/mm3      Neutrophil % 84.9 %      Lymphocyte % 8.8 %       Monocyte % 5.5 %      Eosinophil % 0.2 %      Basophil % 0.2 %      Immature Grans % 0.4 %      Neutrophils, Absolute 4.66 10*3/mm3      Lymphocytes, Absolute 0.48 10*3/mm3      Monocytes, Absolute 0.30 10*3/mm3      Eosinophils, Absolute 0.01 10*3/mm3      Basophils, Absolute 0.01 10*3/mm3      Immature Grans, Absolute 0.02 10*3/mm3      nRBC 0.0 /100 WBC     Urinalysis With Culture If Indicated - Urine, Clean Catch [[id number]]  (Abnormal) Collected: 12/29/22 0120    Specimen: Urine, Clean Catch Updated: 12/29/22 0129     Color, UA Yellow     Appearance, UA Clear     pH, UA 6.0     Specific Gravity, UA 1.022     Glucose, UA Negative     Ketones, UA Negative     Bilirubin, UA Negative     Blood, UA Negative     Protein, UA Trace     Leuk Esterase, UA Negative     Nitrite, UA Negative     Urobilinogen, UA 0.2 E.U./dL    Narrative:      In absence of clinical symptoms, the presence of pyuria, bacteria, and/or nitrites on the urinalysis result does not correlate with infection.  Urine microscopic not indicated.           Imaging:  XR Chest 1 View    Result Date: 12/29/2022  PROCEDURE: XR CHEST 1 VW  COMPARISON: [facility], , XR CHEST 1 VW, 9/30/2021, 13:26.  INDICATIONS: Weak/Dizzy/AMS triage protocol  FINDINGS:  Stable bibasilar streaky opacities, likely scarring or atelectasis.  Lung volumes remain low.  Cardiac silhouette is unchanged.  There is no pneumothorax or pleural effusion.  No focal airspace consolidation.  No acute osseous abnormalities.       Stable hypoinflated lungs with bibasilar streaky opacities, likely scarring or atelectasis.       ELLE PHOENIX MD       Electronically Signed and Approved By: ELLE PHOENIX MD on 12/29/2022 at 1:21               Procedures:  Critical Care  Performed by: Robert Velazquez DO  Authorized by: Robert Velazquez DO     Critical care provider statement:     Critical care time (minutes):  40    Critical care was necessary to treat or prevent imminent or  life-threatening deterioration of the following conditions:  Cardiac failure    Critical care was time spent personally by me on the following activities:  Development of treatment plan with patient or surrogate, evaluation of patient's response to treatment, examination of patient, obtaining history from patient or surrogate, ordering and performing treatments and interventions, ordering and review of laboratory studies, ordering and review of radiographic studies, pulse oximetry, re-evaluation of patient's condition and review of old charts    Care discussed with: admitting provider          Progress                            Medical Decision Making:  MDM   82-year-old male patient presents with generalized weakness.  On exam the patient's heart rhythm is atrial fibrillation with rapid ventricular response.  Patient was given Cardizem and placed on a Cardizem drip.  This gradually improved his heart rate.  Patient will be admitted for further evaluation and treatment.        Final diagnoses:   Atrial fibrillation with rapid ventricular response (HCC)   Generalized weakness        Disposition:  ED Disposition     ED Disposition   Decision to Admit    Condition   --    Comment   Level of Care: Telemetry [5]   Diagnosis: Atrial fibrillation with rapid ventricular response (HCC) [142363]   Admitting Physician: BEBO ENRIQUEZ [[id number]]   Attending Physician: BEBO ENRIQUEZ [[id number]]   Certification: I Certify That Inpatient Hospital Services Are Medically Necessary For Greater Than 2 Midnights               This medical record created using voice recognition software.        Documentation assistance provided by Gaston Miguel acting as scribe for Robert Velazquez MD. Information recorded by the scribe was done at my direction and has been verified and validated by me.       Gaston Miguel  12/29/22 0141       Robert Velazquez DO  12/29/22 0608

## 2022-12-29 NOTE — PLAN OF CARE
Goal Outcome Evaluation:    AAO X4. TOLERATING DIET, ROOM AIR, & ACTIVITY TO BSC WITH 1-2 PERSON ASSIST.     CARDIZEM GTT OFF TODAY BUT PRN ON EMAR FOR RVR. CURRENT CM: A.FIB CVR; RATE 69.    PT IN TELEMETRY OVERFLOW BED; RM# 366.

## 2022-12-29 NOTE — THERAPY EVALUATION
"Acute Care - Speech Language Pathology   Swallow Initial Evaluation [facility]     Patient Name: Justyn Galo  : 1940  MRN: [id number]  Today's Date: 2022               Admit Date: 2022    Visit Dx:     ICD-10-CM ICD-9-CM   1. Atrial fibrillation with rapid ventricular response (HCC)  I48.91 427.31   2. Generalized weakness  R53.1 780.79   3. Oropharyngeal dysphagia  R13.12 787.22     Patient Active Problem List   Diagnosis   • Atrial fibrillation, persistent    • Hypertension   • History of CVA (cerebrovascular accident)   • Hypothyroidism   • Gross hematuria   • Atrial fibrillation with rapid ventricular response (HCC)     Past Medical History:   Diagnosis Date   • Atrial fibrillation, persistent  10/14/2021   • BPH (benign prostatic hyperplasia)    • Disease of thyroid gland    • Essential hypertension 10/14/2021   • Falls frequently     SPOUSE REPORTS MULTIPLE FALLS \"EVERY WEEK. I'VE LOST COUNT.\"   • Generalized weakness    • History of CVA (cerebrovascular accident)    • Paroxysmal atrial fibrillation 10/14/2021   • Runny nose     CHRONIC RUNNY NOSE REPORTED PER SPOUSE.   • Urgency of urination     WEARS BRIEFS AT HOME     Past Surgical History:   Procedure Laterality Date   • LEG SURGERY Right            Inpatient Speech Pathology Dysphagia Evaluation        PAIN SCALE: None indicated.    PRECAUTIONS/CONTRAINDICATIONS: Standard    SUSPECTED ABUSE/NEGLECT/EXPLOITATION: None indicated.    SOCIAL/PSYCHOLOGICAL NEEDS/BARRIERS: None indicated.    PAST SOCIAL HISTORY: 82-year-old male lives at home with his wife    PRIOR FUNCTION: On regular diet    PATIENT GOALS/EXPECTATIONS: Continue eating orally, return home    HISTORY: 82-year-old male with the above diagnosis referred for speech therapy evaluation to assess for swallowing.  Per report wife reports patient having difficulty with swallow at times at home.  Patient states that he occasionally gets choked if he takes too much.  Patient " further states that he was scheduled for a swallow test early January.    CURRENT DIET LEVEL: Regular    OBJECTIVE:    TEST ADMINISTERED: Clinical dysphagia evaluation    COGNITION/SAFETY AWARENESS: Patient followed basic directions and answer simple questions    BEHAVIORAL OBSERVATIONS: Alert and cooperative    ORAL MOTOR EXAM: Some missing dentition, strength/range of motion grossly within functional limits.    VOICE QUALITY: Adequate    REFLEX EXAM: Deferred    POSTURE: Sitting upright in bed    FEEDING/SWALLOWING FUNCTION: Assessed with nectar liquid, thin liquids, puréed solids, crunchy solid.    CLINICAL OBSERVATIONS: Nectar liquid by cup with delay, vocal quality remaining clear to cervical auscultation.  Thin liquid by cup and by straw with delay, vocal quality remaining clear to cervical auscultation.  Purée solid with swallow completed with laryngeal elevation noted to palpation.  Crunchy solid with extended chewing followed by swallow completed clearing the oral cavity.    DYSPHAGIA CRITERIA: Swallow delay, risk of aspiration.    FUNCTIONAL ASSESSMENT INSTRUMENT: Patient currently scored a level 5 of 7 on Functional Communication Measures for swallowing indicating a 20-39% limitation in function.    ASSESSMENT/ PLAN OF CARE:  Pt presents with limitations, noted below, that impede his ability to swallow safely and maintain nutrition. The skills of a therapist will be required to safely and effectively implement the following treatment plan to restore maximal level of function.    PROBLEMS:  1. Swallow delay, risk of aspiration                       LTG 1: 30 days: Patient will increase score for swallowing on the Functional Communication Measures to level 6 of 7 indicating a 1-19 limitation in function.                       STG 1a: 14 days: Patient will tolerate diet regular solids and thin liquid utilizing appropriate positioning and strategies with minimal assistance and min to no signs or symptoms of  aspiration.                       STG 1b: 14 days: Patient will tolerate 8 of 10 trials of thin liquids with min to no signs or symptoms of aspiration.                       STG 1c: 14 days: Patient/family education.                       TREATMENT: Dysphagia therapy to address swallow function through exercises and education of strategies.     FREQUENCY/DURATION: Once daily 5 times per week    REHAB POTENTIAL:  Pt has fair to good rehab potential.  The following limitations may influence improvement/ length of tx: Medical status.    RECOMMENDATIONS:   1.   DIET: Mechanical soft solids with ground meats and gravies, thin liquid.    2.  POSITION: Positioning fully upright for all p.o. intake and 30 minutes following.    3.  COMPENSATORY STRATEGIES: Alternate small bites and small sips of solids and liquids at a slow rate.      Pt/responsible party agrees with plan of care and has been informed of all alternatives, risks and benefits.                            Anticipated Discharge Disposition (SLP): home with assist (12/29/22 5625)                                                            EDUCATION  The patient has been educated in the following areas:   Modified Diet Instruction.              Time Calculation:    Time Calculation- SLP     Row Name 12/29/22 7167             Time Calculation- SLP    SLP Start Time 1200  -TB      SLP Stop Time 1300  -TB      SLP Time Calculation (min) 60 min  -TB      SLP Received On 12/29/22  -TB         Untimed Charges    SLP Eval/Re-eval  ST Eval Oral Pharyng Swallow - 58857  -TB      85454-KD Eval Oral Pharyng Swallow Minutes 60  -TB         Total Minutes    Untimed Charges Total Minutes 60  -TB       Total Minutes 60  -TB            User Key  (r) = Recorded By, (t) = Taken By, (c) = Cosigned By    Initials Name Provider Type    TB Kiah Franco SLP Speech and Language Pathologist                Therapy Charges for Today     Code Description Service Date Service Provider  Modifiers Qty    [id number]  ST EVAL ORAL PHARYNG SWALLOW 4 12/29/2022 Kiah Franco, SLP GN 1               ELVIRA Castillo  12/29/2022

## 2022-12-29 NOTE — CASE MANAGEMENT/SOCIAL WORK
Discharge Planning Assessment   [facility]     Patient Name: Justyn Galo  MRN: [id number]  Today's Date: 12/29/2022    Admit Date: 12/29/2022    Plan: RN CM met with patient at bedside, reviewed facesheet and discharge planning role. Patient reports he is weak but is usually IADL with the help of his wife at home. Patient uses a rolling walker. Discharge planning at this time is uncertain, H&P is not dictacted. RN CM will follow   Discharge Needs Assessment     Row Name 12/29/22 1641       Living Environment    People in Home spouse    Current Living Arrangements home    Primary Care Provided by self;spouse/significant other    Provides Primary Care For no one    Family Caregiver if Needed spouse    Quality of Family Relationships helpful;involved;supportive    Able to Return to Prior Arrangements yes       Resource/Environmental Concerns    Resource/Environmental Concerns none    Transportation Concerns none       Transition Planning    Patient/Family Anticipates Transition to home with family;inpatient rehabilitation facility;home with help/services       Discharge Needs Assessment    Readmission Within the Last 30 Days no previous admission in last 30 days    Equipment Currently Used at Home walker, rolling    Concerns to be Addressed discharge planning    Anticipated Changes Related to Illness none    Equipment Needed After Discharge none    Discharge Facility/Level of Care Needs home with home health;rehabilitation facility    Provided Post Acute Provider List? N/A    Provided Post Acute Provider Quality & Resource List? N/A               Discharge Plan     Row Name 12/29/22 8917       Plan    Plan RN CM met with patient at bedside, reviewed facesheet and discharge planning role. Patient reports he is weak but is usually IADL with the help of his wife at home. Patient uses a rolling walker. Discharge planning at this time is uncertain, H&P is not dictacted. RN CM will follow              Continued Care and  Services - Admitted Since 12/29/2022    Coordination has not been started for this encounter.       Expected Discharge Date and Time     Expected Discharge Date Expected Discharge Time    Dec 31, 2022          Demographic Summary     Row Name 12/29/22 1640       General Information    Admission Type inpatient    Arrived From emergency department;home    Referral Source admission list    Reason for Consult discharge planning    Preferred Language English       Contact Information    Permission Granted to Share Info With family/designee    Contact Information Obtained for                Functional Status     Row Name 12/29/22 1640       Functional Status    Usual Activity Tolerance fair    Current Activity Tolerance fair       Assessment of Health Literacy    How often do you have someone help you read hospital materials? Occasionally    How often do you have problems learning about your medical condition because of difficulty understanding written information? Occasionally    How often do you have a problem understanding what is told to you about your medical condition? Occasionally    How confident are you filling out medical forms by yourself? Somewhat    Health Literacy Fair       Functional Status, IADL    Medications assistive person;independent    Meal Preparation assistive person    Housekeeping assistive person    Laundry assistive person    Shopping assistive person       Mental Status    General Appearance WDL WDL       Mental Status Summary    Recent Changes in Mental Status/Cognitive Functioning no changes               Psychosocial    No documentation.                Abuse/Neglect    No documentation.                Legal    No documentation.                Substance Abuse    No documentation.                Patient Forms    No documentation.                   Bre Lockwood, EVELYN

## 2022-12-30 LAB
ANION GAP SERPL CALCULATED.3IONS-SCNC: 5.9 MMOL/L (ref 5–15)
BASOPHILS # BLD AUTO: 0.02 10*3/MM3 (ref 0–0.2)
BASOPHILS NFR BLD AUTO: 0.4 % (ref 0–1.5)
BUN SERPL-MCNC: 20 MG/DL (ref 8–23)
BUN/CREAT SERPL: 18 (ref 7–25)
CALCIUM SPEC-SCNC: 9.1 MG/DL (ref 8.6–10.5)
CHLORIDE SERPL-SCNC: 103 MMOL/L (ref 98–107)
CO2 SERPL-SCNC: 25.1 MMOL/L (ref 22–29)
CREAT SERPL-MCNC: 1.11 MG/DL (ref 0.76–1.27)
DEPRECATED RDW RBC AUTO: 49.8 FL (ref 37–54)
EGFRCR SERPLBLD CKD-EPI 2021: 66.7 ML/MIN/1.73
EOSINOPHIL # BLD AUTO: 0.16 10*3/MM3 (ref 0–0.4)
EOSINOPHIL NFR BLD AUTO: 3.4 % (ref 0.3–6.2)
ERYTHROCYTE [DISTWIDTH] IN BLOOD BY AUTOMATED COUNT: 13.4 % (ref 12.3–15.4)
GLUCOSE SERPL-MCNC: 112 MG/DL (ref 65–99)
HCT VFR BLD AUTO: 33.4 % (ref 37.5–51)
HGB BLD-MCNC: 11.3 G/DL (ref 13–17.7)
IMM GRANULOCYTES # BLD AUTO: 0.02 10*3/MM3 (ref 0–0.05)
IMM GRANULOCYTES NFR BLD AUTO: 0.4 % (ref 0–0.5)
LYMPHOCYTES # BLD AUTO: 1.21 10*3/MM3 (ref 0.7–3.1)
LYMPHOCYTES NFR BLD AUTO: 25.9 % (ref 19.6–45.3)
MAGNESIUM SERPL-MCNC: 1.9 MG/DL (ref 1.6–2.4)
MCH RBC QN AUTO: 33.9 PG (ref 26.6–33)
MCHC RBC AUTO-ENTMCNC: 33.8 G/DL (ref 31.5–35.7)
MCV RBC AUTO: 100.3 FL (ref 79–97)
MONOCYTES # BLD AUTO: 0.64 10*3/MM3 (ref 0.1–0.9)
MONOCYTES NFR BLD AUTO: 13.7 % (ref 5–12)
NEUTROPHILS NFR BLD AUTO: 2.62 10*3/MM3 (ref 1.7–7)
NEUTROPHILS NFR BLD AUTO: 56.2 % (ref 42.7–76)
NRBC BLD AUTO-RTO: 0 /100 WBC (ref 0–0.2)
PHOSPHATE SERPL-MCNC: 2.9 MG/DL (ref 2.5–4.5)
PLATELET # BLD AUTO: 114 10*3/MM3 (ref 140–450)
PMV BLD AUTO: 9.5 FL (ref 6–12)
POTASSIUM SERPL-SCNC: 3.8 MMOL/L (ref 3.5–5.2)
QT INTERVAL: 344 MS
RBC # BLD AUTO: 3.33 10*6/MM3 (ref 4.14–5.8)
SODIUM SERPL-SCNC: 134 MMOL/L (ref 136–145)
WBC NRBC COR # BLD: 4.67 10*3/MM3 (ref 3.4–10.8)

## 2022-12-30 PROCEDURE — 84100 ASSAY OF PHOSPHORUS: CPT | Performed by: STUDENT IN AN ORGANIZED HEALTH CARE EDUCATION/TRAINING PROGRAM

## 2022-12-30 PROCEDURE — 36415 COLL VENOUS BLD VENIPUNCTURE: CPT | Performed by: STUDENT IN AN ORGANIZED HEALTH CARE EDUCATION/TRAINING PROGRAM

## 2022-12-30 PROCEDURE — 80048 BASIC METABOLIC PNL TOTAL CA: CPT | Performed by: STUDENT IN AN ORGANIZED HEALTH CARE EDUCATION/TRAINING PROGRAM

## 2022-12-30 PROCEDURE — 83735 ASSAY OF MAGNESIUM: CPT | Performed by: STUDENT IN AN ORGANIZED HEALTH CARE EDUCATION/TRAINING PROGRAM

## 2022-12-30 PROCEDURE — 97161 PT EVAL LOW COMPLEX 20 MIN: CPT

## 2022-12-30 PROCEDURE — 85025 COMPLETE CBC W/AUTO DIFF WBC: CPT | Performed by: STUDENT IN AN ORGANIZED HEALTH CARE EDUCATION/TRAINING PROGRAM

## 2022-12-30 RX ADMIN — FAMOTIDINE 40 MG: 20 TABLET, FILM COATED ORAL at 08:33

## 2022-12-30 RX ADMIN — CARVEDILOL 25 MG: 25 TABLET, FILM COATED ORAL at 20:30

## 2022-12-30 RX ADMIN — Medication 10 ML: at 20:30

## 2022-12-30 RX ADMIN — CARVEDILOL 25 MG: 25 TABLET, FILM COATED ORAL at 08:33

## 2022-12-30 RX ADMIN — LEVOTHYROXINE SODIUM 50 MCG: 25 TABLET ORAL at 05:30

## 2022-12-30 RX ADMIN — SENNOSIDES AND DOCUSATE SODIUM 2 TABLET: 8.6; 5 TABLET ORAL at 08:33

## 2022-12-30 RX ADMIN — RIVAROXABAN 20 MG: 20 TABLET, FILM COATED ORAL at 17:03

## 2022-12-30 RX ADMIN — FINASTERIDE 5 MG: 5 TABLET, FILM COATED ORAL at 08:33

## 2022-12-30 RX ADMIN — SODIUM CHLORIDE, POTASSIUM CHLORIDE, SODIUM LACTATE AND CALCIUM CHLORIDE 500 ML: 600; 310; 30; 20 INJECTION, SOLUTION INTRAVENOUS at 11:26

## 2022-12-30 RX ADMIN — FERROUS SULFATE TAB 325 MG (65 MG ELEMENTAL FE) 325 MG: 325 (65 FE) TAB at 08:33

## 2022-12-30 RX ADMIN — ESCITALOPRAM OXALATE 20 MG: 10 TABLET ORAL at 08:33

## 2022-12-30 RX ADMIN — Medication 10 ML: at 08:34

## 2022-12-30 RX ADMIN — TAMSULOSIN HYDROCHLORIDE 0.4 MG: 0.4 CAPSULE ORAL at 17:03

## 2022-12-30 NOTE — CONSULTS
"Nutrition Services    Patient Name: Justyn Galo  YOB: 1940  MRN: [id number]  Admission date: 12/29/2022      CLINICAL NUTRITION ASSESSMENT      Reason for Assessment  Identified at risk by screening criteria, MST score 2+, \"Unsure\" unintentional weight loss     H&P:    Past Medical History:   Diagnosis Date   • Atrial fibrillation, persistent  10/14/2021   • BPH (benign prostatic hyperplasia)    • Disease of thyroid gland    • Essential hypertension 10/14/2021   • Falls frequently     SPOUSE REPORTS MULTIPLE FALLS \"EVERY WEEK. I'VE LOST COUNT.\"   • Generalized weakness    • History of CVA (cerebrovascular accident) 2012    RESIDUAL OF LEFT SIDE WEAKNESS   • Paroxysmal atrial fibrillation 10/14/2021   • Runny nose     CHRONIC RUNNY NOSE REPORTED PER SPOUSE.   • Urgency of urination     WEARS BRIEFS AT HOME        Current Problems:   Active Hospital Problems    Diagnosis    • **Atrial fibrillation with rapid ventricular response (HCC)    • Hypertension    • Atrial fibrillation, persistent     • Hypothyroidism    • History of CVA (cerebrovascular accident)         Nutrition/Diet History         Narrative     Pt is 82 y.o. male admitted for atrial fibrillation w/ hx of stroke, high blood pressure and underactive thyroid. Wt has been steady for 1 year and current PO intake is 100%. No nutrition interventions at this time. RD will CTM per protocol.     Anthropometrics        Current Height, Weight Height: 185.4 cm (73\")  Weight: 92.4 kg (203 lb 11.3 oz)   Current BMI Body mass index is 26.88 kg/m².       Weight Hx  Wt Readings from Last 30 Encounters:   12/30/22 0500 92.4 kg (203 lb 11.3 oz)   12/29/22 0021 93.9 kg (207 lb)   07/20/22 1004 92.6 kg (204 lb 3.2 oz)   04/15/22 0900 92.1 kg (203 lb)   01/21/22 0956 95.2 kg (209 lb 12.8 oz)   01/20/22 1009 91.6 kg (202 lb)   12/31/21 1828 93.4 kg (206 lb)   12/28/21 0851 93.4 kg (206 lb)   10/14/21 0940 93.4 kg (206 lb)   09/30/21 1216 93 kg (205 lb) "   09/30/21 1129 93 kg (205 lb)   03/16/21 0000 93.2 kg (205 lb 8 oz)   03/04/21 0000 94.3 kg (208 lb)   09/09/20 0000 85.7 kg (189 lb)   07/31/20 0000 85.8 kg (189 lb 4 oz)   07/23/20 0000 88.9 kg (196 lb)   03/13/20 0000 93 kg (205 lb)   12/19/19 0000 91.6 kg (202 lb)   06/07/19 0000 90.7 kg (200 lb)   02/26/19 0000 91.2 kg (201 lb)   10/30/18 0000 86.6 kg (191 lb)   03/29/18 0000 89.4 kg (197 lb)   01/12/18 0000 88.5 kg (195 lb)            Wt Change Observation Wt steady for 1 yr     Estimated/Assessed Needs       Energy Requirements 30-35 IBW   EST Needs (kcal/day) 3297-6168       Protein Requirements 1.25-1.5 IBW   EST Daily Needs (g/day) 99.9-120       Fluid Requirements  ml per kcal    Estimated Needs (mL/day) 2300      Labs/Medications         Pertinent Labs Reviewed.   Results from last 7 days   Lab Units 12/30/22 0433 12/29/22 0821 12/29/22 0054   SODIUM mmol/L 134* 135* 137   POTASSIUM mmol/L 3.8 3.9 4.0   CHLORIDE mmol/L 103 101 101   CO2 mmol/L 25.1 25.3 25.0   BUN mg/dL 20 20 20   CREATININE mg/dL 1.11 0.94 1.02   CALCIUM mg/dL 9.1 9.2 9.4   BILIRUBIN mg/dL  --   --  0.9   ALK PHOS U/L  --   --  111   ALT (SGPT) U/L  --   --  15   AST (SGOT) U/L  --   --  25   GLUCOSE mg/dL 112* 140* 127*     Results from last 7 days   Lab Units 12/30/22 0433 12/29/22 0821 12/29/22 0054 12/29/22  0054   MAGNESIUM mg/dL 1.9 1.7  --  1.7   PHOSPHORUS mg/dL 2.9 2.8   < >  --    HEMOGLOBIN g/dL 11.3* 11.6*  --  12.0*   HEMATOCRIT % 33.4* 33.9*  --  35.7*    < > = values in this interval not displayed.     No results found for: COVID19  No results found for: HGBA1C      Pertinent Medications Reviewed.     Current Nutrition Orders & Evaluation of Intake       Oral Nutrition     Current PO Diet Diet: Cardiac Diets; Healthy Heart (2-3 Na+); Texture: Mechanical Ground (NDD 2); Fluid Consistency: Thin (IDDSI 0)   Supplement No active supplement orders       Malnutrition Severity Assessment                Nutrition Diagnosis          Nutrition Dx Problem 1 No nutrition diagnosis at this time        Nutrition Intervention         No nutrition interventions currently     Medical Nutrition Therapy/Nutrition Education          Learner     Readiness Patient and N/A  N/A     Method     Response N/A  N/A     Monitor/Evaluation        Monitor Per protocol, PO intake       Nutrition Discharge Plan         No nutrition discharge needs identified at this time       Electronically signed by:  Diego Tapia RD  12/30/22 12:17 EST

## 2022-12-30 NOTE — PROGRESS NOTES
[facility]     Progress Note    Patient Name: Justyn Galo  : 1940  MRN: [id number]  Primary Care Physician:  Martin Monahan MD  Date of admission: 2022    Subjective   Patient subjectively feels weak this morning  No longer in A. fib for the last 24 hours  Blood pressures have been relatively soft  States that when he gets up and walks sometimes he feels dizzy lightheaded  No other acute events overnight    Facility-Administered Medications as of 2022   Medication Dose Route Frequency Provider Last Rate Last Admin   • acetaminophen (TYLENOL) tablet 650 mg  650 mg Oral Q4H PRN Vazquez Fermin MD        Or   • acetaminophen (TYLENOL) 160 MG/5ML solution 650 mg  650 mg Oral Q4H PRN Vazquez Fermin MD        Or   • acetaminophen (TYLENOL) suppository 650 mg  650 mg Rectal Q4H PRN Vazquez Fermin MD       • ALPRAZolam (XANAX) tablet 0.5 mg  0.5 mg Oral Q8H PRN Vazquez Fermin MD       • aluminum-magnesium hydroxide-simethicone (MAALOX MAX) 400-400-40 MG/5ML suspension 15 mL  15 mL Oral Q6H PRN Vazquez Fermin MD       • sennosides-docusate (PERICOLACE) 8.6-50 MG per tablet 2 tablet  2 tablet Oral BID Vazquez Fermin MD   2 tablet at 22 0833    And   • polyethylene glycol (MIRALAX) packet 17 g  17 g Oral Daily PRN Vazquez Fermin MD        And   • bisacodyl (DULCOLAX) EC tablet 5 mg  5 mg Oral Daily PRN Vazquez Fermin MD        And   • bisacodyl (DULCOLAX) suppository 10 mg  10 mg Rectal Daily PRN Vazquez Fermin MD       • carvedilol (COREG) tablet 25 mg  25 mg Oral BID Vazquez Fermin MD   25 mg at 22 0833   • [COMPLETED] dilTIAZem (CARDIZEM) injection 10 mg  10 mg Intravenous Once Robert Velazquez DO   10 mg at 22 0224   • escitalopram (LEXAPRO) tablet 20 mg  20 mg Oral Daily Vazquez Fermin MD   20 mg at 22 0833   • famotidine (PEPCID) tablet 40 mg  40 mg Oral Daily Vazquez Fermin MD   40 mg at  12/30/22 0833   • ferrous sulfate tablet 325 mg  325 mg Oral Daily Vazquez Fermin MD   325 mg at 12/30/22 0833   • finasteride (PROSCAR) tablet 5 mg  5 mg Oral Daily Vazquez Fermin MD   5 mg at 12/30/22 0833   • HYDROcodone-acetaminophen (NORCO)  MG per tablet 1 tablet  1 tablet Oral Q4H PRN Vazquez Fermin MD       • HYDROcodone-acetaminophen (NORCO) 5-325 MG per tablet 1 tablet  1 tablet Oral Q4H PRN Vazquez Fermin MD       • HYDROmorphone (DILAUDID) injection 0.5 mg  0.5 mg Intravenous Q2H PRN Vazquez Fermin MD        And   • naloxone (NARCAN) injection 0.4 mg  0.4 mg Intravenous Q5 Min PRN Vazquez Fermin MD       • lactated ringers bolus 500 mL  500 mL Intravenous Once Vazquez Fermin MD       • levothyroxine (SYNTHROID, LEVOTHROID) tablet 50 mcg  50 mcg Oral Q AM Vazquez Fermin MD   50 mcg at 12/30/22 0530   • melatonin tablet 5 mg  5 mg Oral Nightly PRN Vazquez Fermin MD       • ondansetron (ZOFRAN) injection 4 mg  4 mg Intravenous Q6H PRN Vazquez Fermin MD       • rivaroxaban (XARELTO) tablet 20 mg  20 mg Oral Daily With Dinner Vazquez Fermin MD   20 mg at 12/29/22 1912   • sodium chloride 0.9 % flush 10 mL  10 mL Intravenous PRN Vazquez Fermin MD   10 mL at 12/29/22 2057   • sodium chloride 0.9 % flush 10 mL  10 mL Intravenous PRN Vazquez Fermin MD   10 mL at 12/29/22 1530   • sodium chloride 0.9 % flush 10 mL  10 mL Intravenous Q12H Vazquez Fermin MD   10 mL at 12/30/22 0834   • sodium chloride 0.9 % infusion 40 mL  40 mL Intravenous PRN Vazquez Fermin MD       • tamsulosin (FLOMAX) 24 hr capsule 0.4 mg  0.4 mg Oral Daily With Dinner Vazquez Fermin MD              Review of Systems  Constitutional:        Weakness tiredness fatigue  Eyes:                       No blurry vision, eye discharge, eye irritation, eye pain  HEENT:                   No acute hair loss, earache and discharge, nasal congestion or  discharge, sore throat, postnasal drip  Respiratory:           No shortness of breath coughing sputum production wheezing hemoptysis pleuritic chest pain  Cardiovascular:     No chest pain, orthopnea, PND, dizziness, palpitation, lower extremity edema  Gastrointestinal:   No nausea vomiting diarrhea abdominal pain constipation  Genitourinary:       No urinary incontinence, hesitancy, frequency, urgency, dysuria  Hematologic:         No bruising, bleeding, pallor, lymphadenopathy  Endocrine:            No coldness, hot flashes, polyuria, abnormal hair growth  Musculoskeletal:    No body pains, aches, arthritic pains, muscle pain ,muscle wasting  Psychiatric:          No low or high mood, anxiety, hallucinations, delusions  Skin.                      No rash, ulcers, bruising, itching  Neurological:        No confusion, headache, focal weakness, numbness, dysphasia    Objective   Objective     Vitals:   Temp:  [97.2 °F (36.2 °C)-98.5 °F (36.9 °C)] 98.5 °F (36.9 °C)  Heart Rate:  [57-98] 98  Resp:  [17-20] 18  BP: ()/() 161/111  Physical Exam    Constitutional: Awake, alert responsive, conversant, no obvious distress              Psychiatric:  Appropriate affect, cooperative   Neurologic:  Awake alert ,oriented x 3, strength symmetric in all extremities, Cranial Nerves grossly intact to confrontation, speech clear   Eyes:   PERRLA, sclerae anicteric, no conjunctival injection   HEENT:  Moist mucous membranes, no nasal or eye discharge, no throat congestion   Neck:   Supple, no thyromegaly, no lymphadenopathy, trachea midline, no elevated JVD   Respiratory:  Clear to auscultation bilaterally, nonlabored respirations    Cardiovascular: RRR, no murmurs, rubs, or gallops, palpable pedal pulses bilaterally, No bilateral ankle edema   Gastrointestinal: Positive bowel sounds, soft, nontender, nondistended, no organomegaly   Musculoskeletal:  No clubbing or cyanosis to extremities,muscle wasting, joint swelling,  muscle weakness             Skin:                      No rashes, bruising, skin ulcers, petechiae or ecchymosis    Result Review    Result Review:  I have personally reviewed the results from the time of this admission to 12/30/2022 10:00 EST and agree with these findings:  [x]  Laboratory  [x]  Microbiology  [x]  Radiology  [x]  EKG/Telemetry   [x]  Cardiology/Vascular   [x]  Pathology  [x]  Old records  []  Other:    Assessment & Plan   Assessment / Plan       Active Hospital Problems:  Active Hospital Problems    Diagnosis    • **Atrial fibrillation with rapid ventricular response (HCC)    • Hypertension    • Atrial fibrillation, persistent     • Hypothyroidism    • History of CVA (cerebrovascular accident)      82 y.o. male with past medical history of hypothyroidism, chronic A. fib on anticoagulation, CVA with left-sided residual weakness, anxiety/depression, BPH who came in due to ongoing weakness and dizziness for the last few days noted to be with A. fib with RVR.  It resolved quickly with diltiazem     Plan:   Continue diltiazem wean down  PT has been ordered and awaiting recommendations.  Patient wishes to go home  Carvedilol 25 twice daily  Continue levothyroxine 50.  TSH was normal  Continue Xarelto 20 daily, tamsulosin 0.4 daily and finasteride 5 daily and escitalopram 20 daily  Will hold off cardiology consult at this time  If okay from PT standpoint and will discharge    Electronically signed by Vazquez Fermin MD, 12/30/22, 10:00 AM EST.

## 2022-12-30 NOTE — PLAN OF CARE
Goal Outcome Evaluation:  Plan of Care Reviewed With: patient           Outcome Evaluation: Presents with decreased strength, transfers, and functional mobility.  He will benefit from skilled physical therapy services to address these deficits.  Patient will also benefit from placement in a rehab facility to progress towards prior level of function.

## 2022-12-30 NOTE — PLAN OF CARE
Goal Outcome Evaluation:               Pt remains on room air. No complaints of pain or soa. Family requesting inpatient rehab at [facility]. Md and case management aware.

## 2022-12-30 NOTE — THERAPY EVALUATION
"Acute Care - Physical Therapy Initial Evaluation   [facility]     Patient Name: Justyn Galo  : 1940  MRN: [id number]  Today's Date: 2022      Visit Dx:     ICD-10-CM ICD-9-CM   1. Atrial fibrillation with rapid ventricular response (HCC)  I48.91 427.31   2. Generalized weakness  R53.1 780.79   3. Oropharyngeal dysphagia  R13.12 787.22   4. Difficulty walking  R26.2 719.7     Patient Active Problem List   Diagnosis   • Atrial fibrillation, persistent    • Hypertension   • History of CVA (cerebrovascular accident)   • Hypothyroidism   • Gross hematuria   • Atrial fibrillation with rapid ventricular response (HCC)     Past Medical History:   Diagnosis Date   • Atrial fibrillation, persistent  10/14/2021   • BPH (benign prostatic hyperplasia)    • Disease of thyroid gland    • Essential hypertension 10/14/2021   • Falls frequently     SPOUSE REPORTS MULTIPLE FALLS \"EVERY WEEK. I'VE LOST COUNT.\"   • Generalized weakness    • History of CVA (cerebrovascular accident) 2012    RESIDUAL OF LEFT SIDE WEAKNESS   • Paroxysmal atrial fibrillation 10/14/2021   • Runny nose     CHRONIC RUNNY NOSE REPORTED PER SPOUSE.   • Urgency of urination     WEARS BRIEFS AT HOME     Past Surgical History:   Procedure Laterality Date   • LEG SURGERY Right      PT Assessment (last 12 hours)     PT Evaluation and Treatment     Row Name 22 1400          Physical Therapy Time and Intention    Subjective Information no complaints  -DP     Document Type evaluation  -DP     Mode of Treatment individual therapy;physical therapy  -DP     Patient Effort adequate  -DP     Row Name 22 1400          General Information    Patient Profile Reviewed yes  -DP     Patient Observations alert;cooperative;agree to therapy  -DP     Prior Level of Function independent:;gait;bed mobility;ADL's;transfer  -DP     Equipment Currently Used at Home walker, rolling  -DP     Existing Precautions/Restrictions fall  -DP     Barriers to Rehab none " identified  -DP     Row Name 12/30/22 1400          Living Environment    Current Living Arrangements home  -DP     Home Accessibility stairs to enter home  -DP     People in Home spouse  -DP     Row Name 12/30/22 1400          Range of Motion (ROM)    Range of Motion bilateral lower extremities;ROM is WFL  -DP     Row Name 12/30/22 1400          Strength (Manual Muscle Testing)    Strength (Manual Muscle Testing) bilateral lower extremities  4-/5  -DP     Row Name 12/30/22 1400          Bed Mobility    Bed Mobility supine-sit-supine  -DP     Supine-Sit-Supine [place] (Bed Mobility) minimum assist (75% patient effort)  -DP     Row Name 12/30/22 1400          Transfers    Transfers sit-stand transfer  -DP     Row Name 12/30/22 1400          Sit-Stand Transfer    Sit-Stand [place] (Transfers) contact guard;minimum assist (75% patient effort)  -DP     Row Name 12/30/22 1400          Gait/Stairs (Locomotion)    Gait/Stairs Locomotion gait/ambulation assistive device  -DP     [place] Level (Gait) contact guard  -DP     Assistive Device (Gait) walker, front-wheeled  -DP     Distance in Feet (Gait) 8  -DP     Row Name 12/30/22 1400          Balance    Balance Assessment standing dynamic balance  -DP     Dynamic Standing Balance contact guard  -DP     Row Name 12/30/22 1400          Plan of Care Review    Plan of Care Reviewed With patient  -DP     Outcome Evaluation Presents with decreased strength, transfers, and functional mobility.  He will benefit from skilled physical therapy services to address these deficits.  Patient will also benefit from placement in a rehab facility to progress towards prior level of function.  -DP     Row Name 12/30/22 1400          Therapy Assessment/Plan (PT)    Rehab Potential (PT) good, to achieve stated therapy goals  -DP     Criteria for Skilled Interventions Met (PT) yes;meets criteria  -DP     Therapy Frequency (PT) daily  -DP     Predicted Duration of Therapy  Intervention (PT) 10 days  -DP     Problem List (PT) problems related to;balance;strength  -DP     Activity Limitations Related to Problem List (PT) unable to transfer safely;unable to ambulate safely  -DP     Row Name 12/30/22 1400          PT Evaluation Complexity    History, PT Evaluation Complexity no personal factors and/or comorbidities  -DP     Examination of Body Systems (PT Eval Complexity) total of 4 or more elements  -DP     Clinical Presentation (PT Evaluation Complexity) stable  -DP     Clinical Decision Making (PT Evaluation Complexity) low complexity  -DP     Overall Complexity (PT Evaluation Complexity) low complexity  -DP     Row Name 12/30/22 1400          Physical Therapy Goals    Transfer Goal Selection (PT) transfer, PT goal 1  -DP     Gait Training Goal Selection (PT) gait training, PT goal 1  -DP     Row Name 12/30/22 1400          Transfer Goal 1 (PT)    Activity/Assistive Device (Transfer Goal 1, PT) transfers, all  -DP     [place] Level/Cues Needed (Transfer Goal 1, PT) modified independence  -DP     Time Frame (Transfer Goal 1, PT) 10 days  -DP     Row Name 12/30/22 1400          Gait Training Goal 1 (PT)    Activity/Assistive Device (Gait Training Goal 1, PT) assistive device use;walker, rolling  -DP     [place] Level (Gait Training Goal 1, PT) modified independence  -DP     Distance (Gait Training Goal 1, PT) 200  -DP     Time Frame (Gait Training Goal 1, PT) 10 days  -DP           User Key  (r) = Recorded By, (t) = Taken By, (c) = Cosigned By    Initials Name Provider Type    Jillian Carter, PT Physical Therapist                  PT Recommendation and Plan  Anticipated Discharge Disposition (PT): inpatient rehabilitation facility, sub acute care setting, skilled nursing facility  Planned Therapy Interventions (PT): balance training, bed mobility training, gait training, strengthening, transfer training  Therapy Frequency (PT): daily  Plan of Care Reviewed With:  patient  Outcome Evaluation: Presents with decreased strength, transfers, and functional mobility.  He will benefit from skilled physical therapy services to address these deficits.  Patient will also benefit from placement in a rehab facility to progress towards prior level of function.   Outcome Measures     Row Name 12/30/22 1500             How much help from another person do you currently need...    Turning from your back to your side while in flat bed without using bedrails? 4  -DP      Moving from lying on back to sitting on the side of a flat bed without bedrails? 3  -DP      Moving to and from a bed to a chair (including a wheelchair)? 3  -DP      Standing up from a chair using your arms (e.g., wheelchair, bedside chair)? 3  -DP      Climbing 3-5 steps with a railing? 3  -DP      To walk in hospital room? 3  -DP      AM-PAC 6 Clicks Score (PT) 19  -DP         Functional Assessment    Outcome Measure Options AM-PAC 6 Clicks Basic Mobility (PT)  -DP            User Key  (r) = Recorded By, (t) = Taken By, (c) = Cosigned By    Initials Name Provider Type    Jillian Carter, PT Physical Therapist                 Time Calculation:    PT Charges     Row Name 12/30/22 1503             Time Calculation    PT Received On 12/30/22  -DP      PT Goal Re-Cert Due Date 01/08/23  -DP         Untimed Charges    PT Eval/Re-eval Minutes 40  -DP         Total Minutes    Untimed Charges Total Minutes 40  -DP       Total Minutes 40  -DP            User Key  (r) = Recorded By, (t) = Taken By, (c) = Cosigned By    Initials Name Provider Type    Jillian Carter, PT Physical Therapist              Therapy Charges for Today     Code Description Service Date Service Provider Modifiers Qty    [id number]  PT EVAL LOW COMPLEXITY 3 12/30/2022 Jillian Valladares, PT GP 1          PT G-Codes  Outcome Measure Options: AM-PAC 6 Clicks Basic Mobility (PT)  AM-PAC 6 Clicks Score (PT): 19    Jillian Valladares, PT  12/30/2022

## 2022-12-31 LAB
ANION GAP SERPL CALCULATED.3IONS-SCNC: 8.6 MMOL/L (ref 5–15)
BASOPHILS # BLD AUTO: 0.02 10*3/MM3 (ref 0–0.2)
BASOPHILS NFR BLD AUTO: 0.4 % (ref 0–1.5)
BUN SERPL-MCNC: 16 MG/DL (ref 8–23)
BUN/CREAT SERPL: 17.2 (ref 7–25)
CALCIUM SPEC-SCNC: 9.3 MG/DL (ref 8.6–10.5)
CHLORIDE SERPL-SCNC: 102 MMOL/L (ref 98–107)
CO2 SERPL-SCNC: 27.4 MMOL/L (ref 22–29)
CREAT SERPL-MCNC: 0.93 MG/DL (ref 0.76–1.27)
DEPRECATED RDW RBC AUTO: 49.1 FL (ref 37–54)
EGFRCR SERPLBLD CKD-EPI 2021: 82.5 ML/MIN/1.73
EOSINOPHIL # BLD AUTO: 0.22 10*3/MM3 (ref 0–0.4)
EOSINOPHIL NFR BLD AUTO: 4.4 % (ref 0.3–6.2)
ERYTHROCYTE [DISTWIDTH] IN BLOOD BY AUTOMATED COUNT: 13.2 % (ref 12.3–15.4)
GLUCOSE SERPL-MCNC: 103 MG/DL (ref 65–99)
HCT VFR BLD AUTO: 35.2 % (ref 37.5–51)
HGB BLD-MCNC: 11.9 G/DL (ref 13–17.7)
IMM GRANULOCYTES # BLD AUTO: 0.01 10*3/MM3 (ref 0–0.05)
IMM GRANULOCYTES NFR BLD AUTO: 0.2 % (ref 0–0.5)
LYMPHOCYTES # BLD AUTO: 1.48 10*3/MM3 (ref 0.7–3.1)
LYMPHOCYTES NFR BLD AUTO: 29.3 % (ref 19.6–45.3)
MAGNESIUM SERPL-MCNC: 1.9 MG/DL (ref 1.6–2.4)
MCH RBC QN AUTO: 33.9 PG (ref 26.6–33)
MCHC RBC AUTO-ENTMCNC: 33.8 G/DL (ref 31.5–35.7)
MCV RBC AUTO: 100.3 FL (ref 79–97)
MONOCYTES # BLD AUTO: 0.63 10*3/MM3 (ref 0.1–0.9)
MONOCYTES NFR BLD AUTO: 12.5 % (ref 5–12)
NEUTROPHILS NFR BLD AUTO: 2.69 10*3/MM3 (ref 1.7–7)
NEUTROPHILS NFR BLD AUTO: 53.2 % (ref 42.7–76)
NRBC BLD AUTO-RTO: 0 /100 WBC (ref 0–0.2)
PHOSPHATE SERPL-MCNC: 3.4 MG/DL (ref 2.5–4.5)
PLATELET # BLD AUTO: 122 10*3/MM3 (ref 140–450)
PMV BLD AUTO: 9.5 FL (ref 6–12)
POTASSIUM SERPL-SCNC: 3.8 MMOL/L (ref 3.5–5.2)
RBC # BLD AUTO: 3.51 10*6/MM3 (ref 4.14–5.8)
SODIUM SERPL-SCNC: 138 MMOL/L (ref 136–145)
WBC NRBC COR # BLD: 5.05 10*3/MM3 (ref 3.4–10.8)

## 2022-12-31 PROCEDURE — 83735 ASSAY OF MAGNESIUM: CPT | Performed by: STUDENT IN AN ORGANIZED HEALTH CARE EDUCATION/TRAINING PROGRAM

## 2022-12-31 PROCEDURE — 80048 BASIC METABOLIC PNL TOTAL CA: CPT | Performed by: STUDENT IN AN ORGANIZED HEALTH CARE EDUCATION/TRAINING PROGRAM

## 2022-12-31 PROCEDURE — 36415 COLL VENOUS BLD VENIPUNCTURE: CPT | Performed by: STUDENT IN AN ORGANIZED HEALTH CARE EDUCATION/TRAINING PROGRAM

## 2022-12-31 PROCEDURE — 84100 ASSAY OF PHOSPHORUS: CPT | Performed by: STUDENT IN AN ORGANIZED HEALTH CARE EDUCATION/TRAINING PROGRAM

## 2022-12-31 PROCEDURE — 85025 COMPLETE CBC W/AUTO DIFF WBC: CPT | Performed by: STUDENT IN AN ORGANIZED HEALTH CARE EDUCATION/TRAINING PROGRAM

## 2022-12-31 RX ADMIN — FAMOTIDINE 40 MG: 20 TABLET, FILM COATED ORAL at 08:15

## 2022-12-31 RX ADMIN — CARVEDILOL 25 MG: 25 TABLET, FILM COATED ORAL at 08:15

## 2022-12-31 RX ADMIN — FINASTERIDE 5 MG: 5 TABLET, FILM COATED ORAL at 08:15

## 2022-12-31 RX ADMIN — LEVOTHYROXINE SODIUM 50 MCG: 25 TABLET ORAL at 05:56

## 2022-12-31 RX ADMIN — Medication 10 ML: at 21:23

## 2022-12-31 RX ADMIN — ESCITALOPRAM OXALATE 20 MG: 10 TABLET ORAL at 08:15

## 2022-12-31 RX ADMIN — SENNOSIDES AND DOCUSATE SODIUM 2 TABLET: 8.6; 5 TABLET ORAL at 21:23

## 2022-12-31 RX ADMIN — CARVEDILOL 25 MG: 25 TABLET, FILM COATED ORAL at 21:23

## 2022-12-31 RX ADMIN — TAMSULOSIN HYDROCHLORIDE 0.4 MG: 0.4 CAPSULE ORAL at 17:18

## 2022-12-31 RX ADMIN — Medication 10 ML: at 08:15

## 2022-12-31 RX ADMIN — RIVAROXABAN 20 MG: 20 TABLET, FILM COATED ORAL at 17:18

## 2022-12-31 RX ADMIN — FERROUS SULFATE TAB 325 MG (65 MG ELEMENTAL FE) 325 MG: 325 (65 FE) TAB at 08:15

## 2022-12-31 NOTE — PLAN OF CARE
Goal Outcome Evaluation:               Patient AAO. VSS. Anxious to discharge home. No complaints at this time.

## 2022-12-31 NOTE — PLAN OF CARE
Goal Outcome Evaluation:               Alert and oriented calls for assistance as needed vital signs stable,

## 2022-12-31 NOTE — PROGRESS NOTES
[facility]     Progress Note    Patient Name: Justyn Galo  : 1940  MRN: [id number]  Primary Care Physician:  Martin Monahan MD  Date of admission: 2022    Subjective   Patient is afebrile control  Blood pressures are better controlled now  Per PT noted that patient has to go to rehab  No other major acute events overnight    Facility-Administered Medications as of 2022   Medication Dose Route Frequency Provider Last Rate Last Admin   • acetaminophen (TYLENOL) tablet 650 mg  650 mg Oral Q4H PRN Vazquez Fermin MD        Or   • acetaminophen (TYLENOL) 160 MG/5ML solution 650 mg  650 mg Oral Q4H PRN Vazquez Fermin MD        Or   • acetaminophen (TYLENOL) suppository 650 mg  650 mg Rectal Q4H PRN Vazquez Fermin MD       • ALPRAZolam (XANAX) tablet 0.5 mg  0.5 mg Oral Q8H PRN Vazquez Fermin MD       • aluminum-magnesium hydroxide-simethicone (MAALOX MAX) 400-400-40 MG/5ML suspension 15 mL  15 mL Oral Q6H PRN Vazquez Fermin MD       • sennosides-docusate (PERICOLACE) 8.6-50 MG per tablet 2 tablet  2 tablet Oral BID Vazquez Fermin MD   2 tablet at 22 0833    And   • polyethylene glycol (MIRALAX) packet 17 g  17 g Oral Daily PRN Vazquez Fermin MD        And   • bisacodyl (DULCOLAX) EC tablet 5 mg  5 mg Oral Daily PRN Vazquez Fermin MD        And   • bisacodyl (DULCOLAX) suppository 10 mg  10 mg Rectal Daily PRN Vazquez Fermin MD       • carvedilol (COREG) tablet 25 mg  25 mg Oral BID Vazquez Fermin MD   25 mg at 22 0815   • [COMPLETED] dilTIAZem (CARDIZEM) injection 10 mg  10 mg Intravenous Once Robert Velazquez DO   10 mg at 22 0224   • escitalopram (LEXAPRO) tablet 20 mg  20 mg Oral Daily Vazquez Fermin MD   20 mg at 22   • famotidine (PEPCID) tablet 40 mg  40 mg Oral Daily Vazquez Fermin MD   40 mg at 22   • ferrous sulfate tablet 325 mg  325 mg Oral Daily Vazquez Fermin  MD   325 mg at 12/31/22 0815   • finasteride (PROSCAR) tablet 5 mg  5 mg Oral Daily Vazquez Fermin MD   5 mg at 12/31/22 0815   • HYDROcodone-acetaminophen (NORCO)  MG per tablet 1 tablet  1 tablet Oral Q4H PRN Vazquez Fermin MD       • HYDROcodone-acetaminophen (NORCO) 5-325 MG per tablet 1 tablet  1 tablet Oral Q4H PRN Vazquez Fermin MD       • HYDROmorphone (DILAUDID) injection 0.5 mg  0.5 mg Intravenous Q2H PRN Vazquez Fermin MD        And   • naloxone (NARCAN) injection 0.4 mg  0.4 mg Intravenous Q5 Min PRN Vazquez Fermin MD       • [COMPLETED] lactated ringers bolus 500 mL  500 mL Intravenous Once Vazquez Fermin MD   Stopped at 12/30/22 1326   • levothyroxine (SYNTHROID, LEVOTHROID) tablet 50 mcg  50 mcg Oral Q AM Vazquez Fermin MD   50 mcg at 12/31/22 0556   • melatonin tablet 5 mg  5 mg Oral Nightly PRN Vazquez Fermin MD       • ondansetron (ZOFRAN) injection 4 mg  4 mg Intravenous Q6H PRN Vazquez Fermin MD       • rivaroxaban (XARELTO) tablet 20 mg  20 mg Oral Daily With Dinner Vazquez Fermin MD   20 mg at 12/30/22 1703   • sodium chloride 0.9 % flush 10 mL  10 mL Intravenous PRN Vazquez Fermin MD   10 mL at 12/29/22 2057   • sodium chloride 0.9 % flush 10 mL  10 mL Intravenous PRN Vazquez Fermin MD   10 mL at 12/29/22 1530   • sodium chloride 0.9 % flush 10 mL  10 mL Intravenous Q12H Vazquez Fermin MD   10 mL at 12/31/22 0815   • sodium chloride 0.9 % infusion 40 mL  40 mL Intravenous PRN Vazquez Fermin MD       • tamsulosin (FLOMAX) 24 hr capsule 0.4 mg  0.4 mg Oral Daily With Dinner Vazquez Fermin MD   0.4 mg at 12/30/22 1708          Review of Systems  Constitutional:        Weakness tiredness fatigue  Eyes:                       No blurry vision, eye discharge, eye irritation, eye pain  HEENT:                   No acute hair loss, earache and discharge, nasal congestion or discharge, sore throat, postnasal  drip  Respiratory:           No shortness of breath coughing sputum production wheezing hemoptysis pleuritic chest pain  Cardiovascular:     No chest pain, orthopnea, PND, dizziness, palpitation, lower extremity edema  Gastrointestinal:   No nausea vomiting diarrhea abdominal pain constipation  Genitourinary:       No urinary incontinence, hesitancy, frequency, urgency, dysuria  Hematologic:         No bruising, bleeding, pallor, lymphadenopathy  Endocrine:            No coldness, hot flashes, polyuria, abnormal hair growth  Musculoskeletal:    No body pains, aches, arthritic pains, muscle pain ,muscle wasting  Psychiatric:          No low or high mood, anxiety, hallucinations, delusions  Skin.                      No rash, ulcers, bruising, itching  Neurological:        No confusion, headache, focal weakness, numbness, dysphasia    Objective   Objective     Vitals:   Temp:  [98.2 °F (36.8 °C)-98.8 °F (37.1 °C)] 98.2 °F (36.8 °C)  Heart Rate:  [63-92] 92  Resp:  [18-20] 18  BP: (136-164)/() 140/62  Physical Exam    Constitutional: Awake, alert responsive, conversant, no obvious distress              Psychiatric:  Appropriate affect, cooperative   Neurologic:  Awake alert ,oriented x 3, strength symmetric in all extremities, Cranial Nerves grossly intact to confrontation, speech clear   Eyes:   PERRLA, sclerae anicteric, no conjunctival injection   HEENT:  Moist mucous membranes, no nasal or eye discharge, no throat congestion   Neck:   Supple, no thyromegaly, no lymphadenopathy, trachea midline, no elevated JVD   Respiratory:  Clear to auscultation bilaterally, nonlabored respirations    Cardiovascular: RRR, no murmurs, rubs, or gallops, palpable pedal pulses bilaterally, No bilateral ankle edema   Gastrointestinal: Positive bowel sounds, soft, nontender, nondistended, no organomegaly   Musculoskeletal:  No clubbing or cyanosis to extremities,muscle wasting, joint swelling, muscle weakness             Skin:                       No rashes, bruising, skin ulcers, petechiae or ecchymosis    Result Review    Result Review:  I have personally reviewed the results from the time of this admission to 12/31/2022 08:17 EST and agree with these findings:  [x]  Laboratory  [x]  Microbiology  [x]  Radiology  [x]  EKG/Telemetry   [x]  Cardiology/Vascular   [x]  Pathology  [x]  Old records  []  Other:    Assessment & Plan   Assessment / Plan       Active Hospital Problems:  Active Hospital Problems    Diagnosis    • **Atrial fibrillation with rapid ventricular response (HCC)    • Hypertension    • Atrial fibrillation, persistent     • Hypothyroidism    • History of CVA (cerebrovascular accident)      82 y.o. male with past medical history of hypothyroidism, chronic A. fib on anticoagulation, CVA with left-sided residual weakness, anxiety/depression, BPH who came in due to ongoing weakness and dizziness for the last few days noted to be with A. fib with RVR.  It resolved quickly with diltiazem stopped on 12/29     Plan:   Carvedilol 25 twice daily  Continue levothyroxine 50.  TSH was normal  Continue Xarelto 20 daily, tamsulosin 0.4 daily and finasteride 5 daily and escitalopram 20 daily  Will hold off cardiology consult at this time  PT recommended inpatient rehab.   have been consulted and awaiting placement

## 2023-01-01 RX ADMIN — Medication 10 ML: at 20:10

## 2023-01-01 RX ADMIN — FINASTERIDE 5 MG: 5 TABLET, FILM COATED ORAL at 09:55

## 2023-01-01 RX ADMIN — FERROUS SULFATE TAB 325 MG (65 MG ELEMENTAL FE) 325 MG: 325 (65 FE) TAB at 09:55

## 2023-01-01 RX ADMIN — Medication 10 ML: at 09:55

## 2023-01-01 RX ADMIN — ESCITALOPRAM OXALATE 20 MG: 10 TABLET ORAL at 09:55

## 2023-01-01 RX ADMIN — TAMSULOSIN HYDROCHLORIDE 0.4 MG: 0.4 CAPSULE ORAL at 17:56

## 2023-01-01 RX ADMIN — CARVEDILOL 25 MG: 25 TABLET, FILM COATED ORAL at 20:10

## 2023-01-01 RX ADMIN — CARVEDILOL 25 MG: 25 TABLET, FILM COATED ORAL at 09:54

## 2023-01-01 RX ADMIN — LEVOTHYROXINE SODIUM 50 MCG: 25 TABLET ORAL at 05:55

## 2023-01-01 RX ADMIN — SENNOSIDES AND DOCUSATE SODIUM 2 TABLET: 8.6; 5 TABLET ORAL at 20:10

## 2023-01-01 RX ADMIN — RIVAROXABAN 20 MG: 20 TABLET, FILM COATED ORAL at 17:56

## 2023-01-01 RX ADMIN — FAMOTIDINE 40 MG: 20 TABLET, FILM COATED ORAL at 09:54

## 2023-01-01 RX ADMIN — Medication 5 MG: at 22:38

## 2023-01-01 RX ADMIN — SENNOSIDES AND DOCUSATE SODIUM 2 TABLET: 8.6; 5 TABLET ORAL at 09:55

## 2023-01-01 RX ADMIN — ALPRAZOLAM 0.5 MG: 0.25 TABLET ORAL at 22:38

## 2023-01-01 NOTE — PLAN OF CARE
Goal Outcome Evaluation:      Patient lying in bed resting quietly. Alert and oriented x4. Up with one assist to bedside commode. Advised to call out for assistance. Bed alarm set. Patient plans to be discharged to rehab placement. No s/s of acute distress noted at this time.

## 2023-01-01 NOTE — PROGRESS NOTES
[facility]     Progress Note    Patient Name: Justyn Galo  : 1940  MRN: [id number]  Primary Care Physician:  Martin Monahan MD  Date of admission: 2022    Subjective   Patient is rate controlled  Blood pressures are stable  No other major acute events overnight  Awaiting rehab placement after weekend  Facility-Administered Medications as of 2023   Medication Dose Route Frequency Provider Last Rate Last Admin   • acetaminophen (TYLENOL) tablet 650 mg  650 mg Oral Q4H PRN Vazquez Fermin MD        Or   • acetaminophen (TYLENOL) 160 MG/5ML solution 650 mg  650 mg Oral Q4H PRN Vazquez Fermin MD        Or   • acetaminophen (TYLENOL) suppository 650 mg  650 mg Rectal Q4H PRN Vazquez Fermin MD       • ALPRAZolam (XANAX) tablet 0.5 mg  0.5 mg Oral Q8H PRN Vazquez Fermin MD       • aluminum-magnesium hydroxide-simethicone (MAALOX MAX) 400-400-40 MG/5ML suspension 15 mL  15 mL Oral Q6H PRN Vazquez Fermin MD       • sennosides-docusate (PERICOLACE) 8.6-50 MG per tablet 2 tablet  2 tablet Oral BID Vazquez Fermin MD   2 tablet at 22    And   • polyethylene glycol (MIRALAX) packet 17 g  17 g Oral Daily PRN Vazquez Fermin MD        And   • bisacodyl (DULCOLAX) EC tablet 5 mg  5 mg Oral Daily PRN Vazquez Fermin MD        And   • bisacodyl (DULCOLAX) suppository 10 mg  10 mg Rectal Daily PRN Vazquez Fermin MD       • carvedilol (COREG) tablet 25 mg  25 mg Oral BID Vazquez Fermin MD   25 mg at 22   • [COMPLETED] dilTIAZem (CARDIZEM) injection 10 mg  10 mg Intravenous Once Robert Velazquez DO   10 mg at 22   • escitalopram (LEXAPRO) tablet 20 mg  20 mg Oral Daily Vazquez Fermin MD   20 mg at 22 0815   • famotidine (PEPCID) tablet 40 mg  40 mg Oral Daily Vazquez Fermin MD   40 mg at 22 0815   • ferrous sulfate tablet 325 mg  325 mg Oral Daily Vazquez Fermin MD   325 mg at 22  0815   • finasteride (PROSCAR) tablet 5 mg  5 mg Oral Daily Vazquez Fermin MD   5 mg at 12/31/22 0815   • HYDROcodone-acetaminophen (NORCO)  MG per tablet 1 tablet  1 tablet Oral Q4H PRN Vazquez Fermin MD       • HYDROcodone-acetaminophen (NORCO) 5-325 MG per tablet 1 tablet  1 tablet Oral Q4H PRN Vazquez Fermin MD       • HYDROmorphone (DILAUDID) injection 0.5 mg  0.5 mg Intravenous Q2H PRN Vazquez Fermin MD        And   • naloxone (NARCAN) injection 0.4 mg  0.4 mg Intravenous Q5 Min PRN Vazquez Fermin MD       • [COMPLETED] lactated ringers bolus 500 mL  500 mL Intravenous Once Vazquez Fermin MD   Stopped at 12/30/22 1326   • levothyroxine (SYNTHROID, LEVOTHROID) tablet 50 mcg  50 mcg Oral Q AM Vazquez Fermin MD   50 mcg at 01/01/23 0555   • melatonin tablet 5 mg  5 mg Oral Nightly PRN Vazquez Fermin MD       • ondansetron (ZOFRAN) injection 4 mg  4 mg Intravenous Q6H PRN Vazquez Fermin MD       • rivaroxaban (XARELTO) tablet 20 mg  20 mg Oral Daily With Dinner Vazquez Fermin MD   20 mg at 12/31/22 1718   • sodium chloride 0.9 % flush 10 mL  10 mL Intravenous PRN Vazquez Fermin MD   10 mL at 12/29/22 2057   • sodium chloride 0.9 % flush 10 mL  10 mL Intravenous PRN Vazquez Fermin MD   10 mL at 12/29/22 1530   • sodium chloride 0.9 % flush 10 mL  10 mL Intravenous Q12H Vazquez Fermin MD   10 mL at 12/31/22 2123   • sodium chloride 0.9 % infusion 40 mL  40 mL Intravenous PRN Vazquez Fermin MD       • tamsulosin (FLOMAX) 24 hr capsule 0.4 mg  0.4 mg Oral Daily With Dinner Vazquez Fermin MD   0.4 mg at 12/31/22 0856          Review of Systems  Constitutional:        Weakness tiredness fatigue  Eyes:                       No blurry vision, eye discharge, eye irritation, eye pain  HEENT:                   No acute hair loss, earache and discharge, nasal congestion or discharge, sore throat, postnasal drip  Respiratory:            No shortness of breath coughing sputum production wheezing hemoptysis pleuritic chest pain  Cardiovascular:     No chest pain, orthopnea, PND, dizziness, palpitation, lower extremity edema  Gastrointestinal:   No nausea vomiting diarrhea abdominal pain constipation  Genitourinary:       No urinary incontinence, hesitancy, frequency, urgency, dysuria  Hematologic:         No bruising, bleeding, pallor, lymphadenopathy  Endocrine:            No coldness, hot flashes, polyuria, abnormal hair growth  Musculoskeletal:    No body pains, aches, arthritic pains, muscle pain ,muscle wasting  Psychiatric:          No low or high mood, anxiety, hallucinations, delusions  Skin.                      No rash, ulcers, bruising, itching  Neurological:        No confusion, headache, focal weakness, numbness, dysphasia    Objective   Objective     Vitals:   Temp:  [97.7 °F (36.5 °C)-98.1 °F (36.7 °C)] 97.9 °F (36.6 °C)  Heart Rate:  [] 50  Resp:  [18] 18  BP: (147-156)/() 148/114  Physical Exam    Constitutional: Awake, alert responsive, conversant, no obvious distress              Psychiatric:  Appropriate affect, cooperative   Neurologic:  Awake alert ,oriented x 3, strength symmetric in all extremities, Cranial Nerves grossly intact to confrontation, speech clear   Eyes:   PERRLA, sclerae anicteric, no conjunctival injection   HEENT:  Moist mucous membranes, no nasal or eye discharge, no throat congestion   Neck:   Supple, no thyromegaly, no lymphadenopathy, trachea midline, no elevated JVD   Respiratory:  Clear to auscultation bilaterally, nonlabored respirations    Cardiovascular: RRR, no murmurs, rubs, or gallops, palpable pedal pulses bilaterally, No bilateral ankle edema   Gastrointestinal: Positive bowel sounds, soft, nontender, nondistended, no organomegaly   Musculoskeletal:  No clubbing or cyanosis to extremities,muscle wasting, joint swelling, muscle weakness             Skin:                      No  rashes, bruising, skin ulcers, petechiae or ecchymosis    Result Review    Result Review:  I have personally reviewed the results from the time of this admission to 1/1/2023 08:02 EST and agree with these findings:  [x]  Laboratory  [x]  Microbiology  [x]  Radiology  [x]  EKG/Telemetry   [x]  Cardiology/Vascular   [x]  Pathology  [x]  Old records  []  Other:    Assessment & Plan   Assessment / Plan       Active Hospital Problems:  Active Hospital Problems    Diagnosis    • **Atrial fibrillation with rapid ventricular response (HCC)    • Hypertension    • Atrial fibrillation, persistent     • Hypothyroidism    • History of CVA (cerebrovascular accident)      82 y.o. male with past medical history of hypothyroidism, chronic A. fib on anticoagulation, CVA with left-sided residual weakness, anxiety/depression, BPH who came in due to ongoing weakness and dizziness for the last few days noted to be with A. fib with RVR.  It resolved quickly with diltiazem stopped on 12/29     Plan:   Carvedilol 25 twice daily  Continue levothyroxine 50.  TSH was normal  Continue Xarelto 20 daily, tamsulosin 0.4 daily and finasteride 5 daily and escitalopram 20 daily  Will hold off cardiology consult at this time  PT recommended inpatient rehab.   have been consulted and awaiting placement

## 2023-01-02 RX ADMIN — RIVAROXABAN 20 MG: 20 TABLET, FILM COATED ORAL at 17:11

## 2023-01-02 RX ADMIN — LEVOTHYROXINE SODIUM 50 MCG: 25 TABLET ORAL at 05:31

## 2023-01-02 RX ADMIN — CARVEDILOL 25 MG: 25 TABLET, FILM COATED ORAL at 20:35

## 2023-01-02 RX ADMIN — CARVEDILOL 25 MG: 25 TABLET, FILM COATED ORAL at 10:08

## 2023-01-02 RX ADMIN — SENNOSIDES AND DOCUSATE SODIUM 2 TABLET: 8.6; 5 TABLET ORAL at 10:08

## 2023-01-02 RX ADMIN — ESCITALOPRAM OXALATE 20 MG: 10 TABLET ORAL at 10:08

## 2023-01-02 RX ADMIN — FAMOTIDINE 40 MG: 20 TABLET, FILM COATED ORAL at 10:08

## 2023-01-02 RX ADMIN — SENNOSIDES AND DOCUSATE SODIUM 2 TABLET: 8.6; 5 TABLET ORAL at 20:35

## 2023-01-02 RX ADMIN — Medication 10 ML: at 10:10

## 2023-01-02 RX ADMIN — FINASTERIDE 5 MG: 5 TABLET, FILM COATED ORAL at 10:08

## 2023-01-02 RX ADMIN — TAMSULOSIN HYDROCHLORIDE 0.4 MG: 0.4 CAPSULE ORAL at 17:11

## 2023-01-02 RX ADMIN — ACETAMINOPHEN 650 MG: 325 TABLET ORAL at 20:35

## 2023-01-02 RX ADMIN — FERROUS SULFATE TAB 325 MG (65 MG ELEMENTAL FE) 325 MG: 325 (65 FE) TAB at 10:08

## 2023-01-02 RX ADMIN — ALPRAZOLAM 0.5 MG: 0.25 TABLET ORAL at 11:47

## 2023-01-02 NOTE — PLAN OF CARE
Goal Outcome Evaluation:      PT is confused and becoming more agitated to go home. PT refusing to wear heart monitor and to replace IV's that were lost.  Has tried multiple times to get out of bed.Pt is unsteady on feet when tempting to get out of bed. PT stated that he is going home today and that he will call his wife to pick him up at the bank. VS stable and no complaint of pain. Bed alarm is on. Will continue with plan of care.

## 2023-01-02 NOTE — PLAN OF CARE
Goal Outcome Evaluation:   Patient is confused. Patient has been asleep for most of the shift. Patient is wearing heart monitor. Patient is still refusing to have an IV started. VSS. Afib on the monitor.

## 2023-01-03 PROBLEM — D50.9 IRON DEFICIENCY ANEMIA: Status: ACTIVE | Noted: 2023-01-03

## 2023-01-03 PROCEDURE — 97110 THERAPEUTIC EXERCISES: CPT

## 2023-01-03 PROCEDURE — 97116 GAIT TRAINING THERAPY: CPT

## 2023-01-03 PROCEDURE — 97530 THERAPEUTIC ACTIVITIES: CPT

## 2023-01-03 RX ADMIN — FAMOTIDINE 40 MG: 20 TABLET, FILM COATED ORAL at 08:36

## 2023-01-03 RX ADMIN — CARVEDILOL 25 MG: 25 TABLET, FILM COATED ORAL at 08:36

## 2023-01-03 RX ADMIN — ACETAMINOPHEN 650 MG: 325 TABLET ORAL at 08:36

## 2023-01-03 RX ADMIN — FINASTERIDE 5 MG: 5 TABLET, FILM COATED ORAL at 08:36

## 2023-01-03 RX ADMIN — ALPRAZOLAM 0.5 MG: 0.25 TABLET ORAL at 21:24

## 2023-01-03 RX ADMIN — Medication 10 ML: at 08:37

## 2023-01-03 RX ADMIN — RIVAROXABAN 20 MG: 20 TABLET, FILM COATED ORAL at 18:10

## 2023-01-03 RX ADMIN — LEVOTHYROXINE SODIUM 50 MCG: 25 TABLET ORAL at 05:31

## 2023-01-03 RX ADMIN — Medication 5 MG: at 21:16

## 2023-01-03 RX ADMIN — SENNOSIDES AND DOCUSATE SODIUM 2 TABLET: 8.6; 5 TABLET ORAL at 08:36

## 2023-01-03 RX ADMIN — TAMSULOSIN HYDROCHLORIDE 0.4 MG: 0.4 CAPSULE ORAL at 18:10

## 2023-01-03 RX ADMIN — SENNOSIDES AND DOCUSATE SODIUM 2 TABLET: 8.6; 5 TABLET ORAL at 21:24

## 2023-01-03 RX ADMIN — CARVEDILOL 25 MG: 25 TABLET, FILM COATED ORAL at 21:16

## 2023-01-03 RX ADMIN — ESCITALOPRAM OXALATE 20 MG: 10 TABLET ORAL at 08:36

## 2023-01-03 NOTE — PLAN OF CARE
Goal Outcome Evaluation:      Pt has been up to the bedside commode multiple times today with loose stool.  Pt is incontinent of bowel and bladder.  Pt is unsteady on his feet, he does have a bed alarm.  Pt does not show any signs of distress.  No needs voiced at this time. Call light within reach.

## 2023-01-03 NOTE — PROGRESS NOTES
[facility]     Progress Note    Patient Name: Justyn Galo  : 1940  MRN: [id number]  Primary Care Physician:  Martin Monahan MD  Date of admission: 2022    Subjective Patient is fully awake alert responsive not in any acute distress however he is very weak    Review of Systems  Constitutional:        Weakness tiredness fatigue  Eyes:                       No blurry vision, eye discharge, eye irritation, eye pain  HEENT:                   No acute hair loss, earache and discharge, nasal congestion or discharge, sore throat, postnasal drip  Respiratory:           No shortness of breath coughing sputum production wheezing hemoptysis pleuritic chest pain  Cardiovascular:     No chest pain, orthopnea, PND, dizziness, palpitation, lower extremity edema  Gastrointestinal:   No nausea vomiting diarrhea abdominal pain constipation  Genitourinary:       No urinary incontinence, hesitancy, frequency, urgency, dysuria  Hematologic:         No bruising, bleeding, pallor, lymphadenopathy  Endocrine:            No coldness, hot flashes, polyuria, abnormal hair growth  Musculoskeletal:    No body pains, aches, arthritic pains, muscle pain ,muscle wasting  Psychiatric:          No low or high mood, anxiety, hallucinations, delusions  Skin.                      No rash, ulcers, bruising, itching  Neurological:        No confusion, headache, focal weakness, numbness, dysphasia    Objective   Objective     Vitals:   Temp:  [97.3 °F (36.3 °C)-98.9 °F (37.2 °C)] 97.5 °F (36.4 °C)  Heart Rate:  [] 52  Resp:  [18] 18  BP: (133-158)/(69-97) 157/97  Physical Exam    Constitutional: Awake, alert responsive, conversant, no obvious distress              Psychiatric:  Appropriate affect, cooperative   Neurologic:  Awake alert ,oriented x 3, strength symmetric in all extremities, Cranial Nerves grossly intact to confrontation, speech clear   Eyes:   PERRLA, sclerae anicteric, no conjunctival  injection   HEENT:  Moist mucous membranes, no nasal or eye discharge, no throat congestion   Neck:   Supple, no thyromegaly, no lymphadenopathy, trachea midline, no elevated JVD   Respiratory:  Clear to auscultation bilaterally, nonlabored respirations    Cardiovascular: RRR, no murmurs, rubs, or gallops, palpable pedal pulses bilaterally, No bilateral ankle edema   Gastrointestinal: Positive bowel sounds, soft, nontender, nondistended, no organomegaly   Musculoskeletal:  No clubbing or cyanosis to extremities,muscle wasting, joint swelling, muscle weakness             Skin:                      No rashes, bruising, skin ulcers, petechiae or ecchymosis    Result Review    Result Review:  I have personally reviewed the results from the time of this admission to 1/3/2023 08:34 EST and agree with these findings:  []  Laboratory  []  Microbiology  []  Radiology  []  EKG/Telemetry   []  Cardiology/Vascular   []  Pathology  []  Old records  []  Other:    Assessment & Plan   Assessment / Plan       Active Hospital Problems:    Active Hospital Problems    Diagnosis  POA   • **Atrial fibrillation with rapid ventricular response (HCC) [I48.91]  Yes   • Iron deficiency anemia [D50.9]  Unknown   • Hypertension [I10]  Yes   • Atrial fibrillation, persistent  [I48.19]  Yes   • Hypothyroidism [E03.9]  Yes   • History of CVA (cerebrovascular accident) [Z86.73]  Not Applicable       Plan:   Give IV iron during the hospitalization  Waiting for rehab placement  2D echo normal ejection fraction    Electronically signed by Jimmie Douglas MD, 01/03/23, 8:34 AM EST.

## 2023-01-03 NOTE — THERAPY TREATMENT NOTE
"Acute Care - Physical Therapy Treatment Note  [facility]     Patient Name: Justyn Galo  : 1940  MRN: [id number]  Today's Date: 1/3/2023      Visit Dx:     ICD-10-CM ICD-9-CM   1. Atrial fibrillation with rapid ventricular response (HCC)  I48.91 427.31   2. Generalized weakness  R53.1 780.79   3. Oropharyngeal dysphagia  R13.12 787.22   4. Difficulty walking  R26.2 719.7     Patient Active Problem List   Diagnosis   • Atrial fibrillation, persistent    • Hypertension   • History of CVA (cerebrovascular accident)   • Hypothyroidism   • Gross hematuria   • Atrial fibrillation with rapid ventricular response (HCC)   • Iron deficiency anemia     Past Medical History:   Diagnosis Date   • Atrial fibrillation, persistent  10/14/2021   • BPH (benign prostatic hyperplasia)    • Disease of thyroid gland    • Essential hypertension 10/14/2021   • Falls frequently     SPOUSE REPORTS MULTIPLE FALLS \"EVERY WEEK. I'VE LOST COUNT.\"   • Generalized weakness    • History of CVA (cerebrovascular accident) 2012    RESIDUAL OF LEFT SIDE WEAKNESS   • Paroxysmal atrial fibrillation 10/14/2021   • Runny nose     CHRONIC RUNNY NOSE REPORTED PER SPOUSE.   • Urgency of urination     WEARS BRIEFS AT HOME     Past Surgical History:   Procedure Laterality Date   • LEG SURGERY Right      PT Assessment (last 12 hours)     PT Evaluation and Treatment     Row Name 23 1422          Physical Therapy Time and Intention    Subjective Information no complaints  -DK     Document Type therapy note (daily note)  -DK     Mode of Treatment individual therapy;physical therapy  -DK     Patient Effort good  -DK     Symptoms Noted During/After Treatment fatigue  -DK     Comment Pt is somewhat [place].  He spent several minutes on the phone at beginning of treatment.  -DK     Row Name 23 1422          Pain    Pretreatment Pain Rating 0/10 - no pain  -DK     Posttreatment Pain Rating 0/10 - no pain  -DK     Row Name 23 1422          " Cognition    Affect/Mental Status (Cognition) WFL  -DK     Orientation Status (Cognition) oriented x 4  -DK     Follows Commands (Cognition) WFL  -DK     Cognitive Function WFL  -DK     Personal Safety Interventions gait belt;nonskid shoes/slippers when out of bed;supervised activity  -DK     Row Name 01/03/23 1422          Bed Mobility    Bed Mobility supine-sit-supine  -DK     Supine-Sit-Supine [place] (Bed Mobility) standby assist;contact guard;1 person assist  -DK     Assistive Device (Bed Mobility) bed rails  -DK     Row Name 01/03/23 1422          Transfers    Transfers sit-stand transfer;stand-sit transfer  -DK     Row Name 01/03/23 1422          Sit-Stand Transfer    Sit-Stand [place] (Transfers) standby assist;contact guard;1 person assist  -DK     Assistive Device (Sit-Stand Transfers) walker, front-wheeled  -DK     Row Name 01/03/23 1422          Stand-Sit Transfer    Stand-Sit [place] (Transfers) standby assist;contact guard;1 person assist  -DK     Assistive Device (Stand-Sit Transfers) walker, front-wheeled  -DK     Row Name 01/03/23 1422          Gait/Stairs (Locomotion)    Gait/Stairs Locomotion gait/ambulation independence;gait/ambulation assistive device;distance ambulated;gait pattern  -DK     [place] Level (Gait) contact guard;minimum assist (75% patient effort);1 person assist  -DK     Assistive Device (Gait) walker, front-wheeled  -DK     Distance in Feet (Gait) 30  -DK     Pattern (Gait) step-to  -DK     Deviations/Abnormal Patterns (Gait) base of support, narrow;festinating/shuffling;stride length decreased  -DK     Bilateral Gait Deviations forward flexed posture  -DK     Comment, (Gait/Stairs) Pt ambulated on room air with a rolling walker.  He returned to bed on alert post treatment.  -DK     Row Name 01/03/23 1422          Safety Issues, Functional Mobility    Safety Issues Affecting Function (Mobility) ability to follow commands;awareness of need for  assistance;impulsivity;judgment;safety precaution awareness  -DK     Impairments Affecting Function (Mobility) balance;endurance/activity tolerance;strength  -     Row Name 01/03/23 1422          Balance    Balance Assessment sitting static balance;sitting dynamic balance;standing static balance;standing dynamic balance  -DK     Static Sitting Balance standby assist  -DK     Dynamic Sitting Balance standby assist  -DK     Position, Sitting Balance unsupported;sitting edge of bed  -DK     Static Standing Balance contact guard;1-person assist  -DK     Dynamic Standing Balance contact guard;minimal assist;1-person assist  -DK     Position/Device Used, Standing Balance walker, front-wheeled  -DK     Balance Interventions standing;dynamic;tandem gait  -     Row Name 01/03/23 1422          Motor Skills    Motor Skills --  therapeutic exercises  -     Therapeutic Exercise hip;knee;ankle  -     Row Name 01/03/23 1422          Hip (Therapeutic Exercise)    Hip (Therapeutic Exercise) AROM (active range of motion)  -DK     Hip AROM (Therapeutic Exercise) bilateral;flexion;extension;aBduction;aDduction;sitting;20 repititions  -     Row Name 01/03/23 1422          Knee (Therapeutic Exercise)    Knee (Therapeutic Exercise) AROM (active range of motion)  -DK     Knee AROM (Therapeutic Exercise) bilateral;flexion;extension;LAQ (long arc quad);sitting;20 repititions  -     Row Name 01/03/23 1422          Ankle (Therapeutic Exercise)    Ankle (Therapeutic Exercise) AROM (active range of motion)  -DK     Ankle AROM (Therapeutic Exercise) bilateral;dorsiflexion;plantarflexion;sitting;20 repititions  -     Row Name 01/03/23 1422          Plan of Care Review    Plan of Care Reviewed With patient  -DK     Progress improving  -     Row Name 01/03/23 1422          Positioning and Restraints    Pre-Treatment Position in bed  -DK     Post Treatment Position bed  -DK     In Bed supine;call light within reach;encouraged to call  for assist;exit alarm on;side rails up x2  -DK     Row Name 01/03/23 1422          Therapy Assessment/Plan (PT)    Rehab Potential (PT) good, to achieve stated therapy goals  -DK     Criteria for Skilled Interventions Met (PT) skilled treatment is necessary  -DK     Therapy Frequency (PT) daily  -DK     Problem List (PT) problems related to;balance;mobility;strength;hearing  -DK     Activity Limitations Related to Problem List (PT) unable to ambulate safely;unable to transfer safely  -DK     Row Name 01/03/23 1422          Progress Summary (PT)    Progress Toward Functional Goals (PT) progress toward functional goals is good  -DK           User Key  (r) = Recorded By, (t) = Taken By, (c) = Cosigned By    Initials Name Provider Type    Juana Russell PTA Physical Therapist Assistant                  PT Recommendation and Plan  Planned Therapy Interventions (PT): balance training, bed mobility training, gait training, home exercise program, strengthening, transfer training  Therapy Frequency (PT): daily  Progress Summary (PT)  Progress Toward Functional Goals (PT): progress toward functional goals is good  Plan of Care Reviewed With: patient  Progress: improving   Outcome Measures     Row Name 01/03/23 1422             How much help from another person do you currently need...    Turning from your back to your side while in flat bed without using bedrails? 4  -DK      Moving from lying on back to sitting on the side of a flat bed without bedrails? 4  -DK      Moving to and from a bed to a chair (including a wheelchair)? 3  -DK      Standing up from a chair using your arms (e.g., wheelchair, bedside chair)? 3  -DK      Climbing 3-5 steps with a railing? 2  -DK      To walk in hospital room? 3  -DK      AM-PAC 6 Clicks Score (PT) 19  -DK         Functional Assessment    Outcome Measure Options AM-PAC 6 Clicks Basic Mobility (PT)  -DK            User Key  (r) = Recorded By, (t) = Taken By, (c) = Cosigned By     Initials Name Provider Type    Juana Russell PTA Physical Therapist Assistant                 Time Calculation:    PT Charges     Row Name 01/03/23 1427             Time Calculation    PT Received On 01/03/23  -TYLER      PT Goal Re-Cert Due Date 01/08/23  -DK         Timed Charges    43824 - PT Therapeutic Exercise Minutes 13  -DK      59386 - Gait Training Minutes  6  -DK      49390 - PT Therapeutic Activity Minutes 19  -DK         Total Minutes    Timed Charges Total Minutes 38  -DK       Total Minutes 38  -DK            User Key  (r) = Recorded By, (t) = Taken By, (c) = Cosigned By    Initials Name Provider Type    Juana Russell PTA Physical Therapist Assistant              Therapy Charges for Today     Code Description Service Date Service Provider Modifiers Qty    22374092326 HC PT THER PROC EA 15 MIN 1/3/2023 Juana Patel PTA GP 1    38749490428 HC GAIT TRAINING EA 15 MIN 1/3/2023 Juana Patel PTA GP 1    98948185721 HC PT THERAPEUTIC ACT EA 15 MIN 1/3/2023 Juana Patel, JIMMY GP 1          PT G-Codes  Outcome Measure Options: AM-PAC 6 Clicks Basic Mobility (PT)  AM-PAC 6 Clicks Score (PT): 19    Juana Patel PTA  1/3/2023

## 2023-01-03 NOTE — PLAN OF CARE
Goal Outcome Evaluation:               Patient alert to person and time, safety sitter at bedside. Patient has intermittent confusion. Refuses IV. Blanchable redness to bottom, cream applied, patient turned every 2 hours and PRN. Patient waiting for rehab placement at [facility]

## 2023-01-04 PROCEDURE — 97110 THERAPEUTIC EXERCISES: CPT

## 2023-01-04 PROCEDURE — 97116 GAIT TRAINING THERAPY: CPT

## 2023-01-04 RX ADMIN — CARVEDILOL 25 MG: 25 TABLET, FILM COATED ORAL at 08:49

## 2023-01-04 RX ADMIN — FINASTERIDE 5 MG: 5 TABLET, FILM COATED ORAL at 08:49

## 2023-01-04 RX ADMIN — SENNOSIDES AND DOCUSATE SODIUM 2 TABLET: 8.6; 5 TABLET ORAL at 08:49

## 2023-01-04 RX ADMIN — CARVEDILOL 25 MG: 25 TABLET, FILM COATED ORAL at 21:02

## 2023-01-04 RX ADMIN — FAMOTIDINE 40 MG: 20 TABLET, FILM COATED ORAL at 08:48

## 2023-01-04 RX ADMIN — RIVAROXABAN 20 MG: 20 TABLET, FILM COATED ORAL at 19:19

## 2023-01-04 RX ADMIN — LEVOTHYROXINE SODIUM 50 MCG: 25 TABLET ORAL at 08:48

## 2023-01-04 RX ADMIN — TAMSULOSIN HYDROCHLORIDE 0.4 MG: 0.4 CAPSULE ORAL at 19:20

## 2023-01-04 RX ADMIN — ESCITALOPRAM OXALATE 20 MG: 10 TABLET ORAL at 08:48

## 2023-01-04 NOTE — PLAN OF CARE
Goal Outcome Evaluation:      Pt A/O to self and place. Vs stable. Will continue with plan of care.

## 2023-01-04 NOTE — THERAPY TREATMENT NOTE
"Acute Care - Physical Therapy Treatment Note   [place]     Patient Name: Justyn Galo  : 1940  MRN: [id number]  Today's Date: 2023      Visit Dx:     ICD-10-CM ICD-9-CM   1. Atrial fibrillation with rapid ventricular response (HCC)  I48.91 427.31   2. Generalized weakness  R53.1 780.79   3. Oropharyngeal dysphagia  R13.12 787.22   4. Difficulty walking  R26.2 719.7     Patient Active Problem List   Diagnosis   • Atrial fibrillation, persistent    • Hypertension   • History of CVA (cerebrovascular accident)   • Hypothyroidism   • Gross hematuria   • Atrial fibrillation with rapid ventricular response (HCC)   • Iron deficiency anemia     Past Medical History:   Diagnosis Date   • Atrial fibrillation, persistent  10/14/2021   • BPH (benign prostatic hyperplasia)    • Disease of thyroid gland    • Essential hypertension 10/14/2021   • Falls frequently     SPOUSE REPORTS MULTIPLE FALLS \"EVERY WEEK. I'VE LOST COUNT.\"   • Generalized weakness    • History of CVA (cerebrovascular accident) 2012    RESIDUAL OF LEFT SIDE WEAKNESS   • Paroxysmal atrial fibrillation 10/14/2021   • Runny nose     CHRONIC RUNNY NOSE REPORTED PER SPOUSE.   • Urgency of urination     WEARS BRIEFS AT HOME     Past Surgical History:   Procedure Laterality Date   • LEG SURGERY Right      PT Assessment (last 12 hours)     PT Evaluation and Treatment     Row Name 23 1155          Physical Therapy Time and Intention    Subjective Information no complaints  -WM     Document Type therapy note (daily note)  -WM     Mode of Treatment individual therapy;physical therapy  -WM     Patient Effort good  -WM     Symptoms Noted During/After Treatment fatigue  -WM     Row Name 23 1155          Pain    Pretreatment Pain Rating 0/10 - no pain  -WM     Posttreatment Pain Rating 0/10 - no pain  -WM     Row Name 23 1155          Cognition    Affect/Mental Status (Cognition) WFL  -WM     Row Name 23 1155          Bed Mobility    " Supine-Sit-Supine [place] (Bed Mobility) standby assist  -     Assistive Device (Bed Mobility) bed rails  -     Row Name 01/04/23 1155          Sit-Stand Transfer    Sit-Stand [place] (Transfers) standby assist;contact guard  -     Assistive Device (Sit-Stand Transfers) walker, front-wheeled  -     Row Name 01/04/23 1155          Stand-Sit Transfer    Stand-Sit [place] (Transfers) standby assist;contact guard  -     Assistive Device (Stand-Sit Transfers) walker, front-wheeled  -     Row Name 01/04/23 1155          Gait/Stairs (Locomotion)    [place] Level (Gait) contact guard  -     Assistive Device (Gait) walker, front-wheeled  -     Distance in Feet (Gait) 120  -     Pattern (Gait) 4-point;step-through  -     Deviations/Abnormal Patterns (Gait) gait speed decreased;stride length decreased  -     Row Name 01/04/23 1155          Safety Issues, Functional Mobility    Impairments Affecting Function (Mobility) balance;endurance/activity tolerance;strength  -     Row Name 01/04/23 1155          Hip (Therapeutic Exercise)    Hip (Therapeutic Exercise) strengthening exercise  -     Hip Strengthening (Therapeutic Exercise) bilateral;aBduction;aDduction;sitting;resistance band;yellow;15 repititions;2 sets;marching while seated  -     Row Name 01/04/23 1155          Knee (Therapeutic Exercise)    Knee AROM (Therapeutic Exercise) bilateral;LAQ (long arc quad);sitting;15 repititions;2 sets  -     Row Name 01/04/23 1155          Ankle (Therapeutic Exercise)    Ankle AROM (Therapeutic Exercise) bilateral;dorsiflexion;plantarflexion;sitting;30 repititions  -     Row Name 01/04/23 1155          Progress Summary (PT)    Progress Toward Functional Goals (PT) progress toward functional goals is good  -           User Key  (r) = Recorded By, (t) = Taken By, (c) = Cosigned By    Initials Name Provider Type    Justyn Neil PTA Physical Therapist Assistant                  PT  Recommendation and Plan     Progress Summary (PT)  Progress Toward Functional Goals (PT): progress toward functional goals is good   Outcome Measures     Row Name 01/04/23 1159 01/03/23 1422          How much help from another person do you currently need...    Turning from your back to your side while in flat bed without using bedrails? 4  -WM 4  -DK     Moving from lying on back to sitting on the side of a flat bed without bedrails? 4  -WM 4  -DK     Moving to and from a bed to a chair (including a wheelchair)? 3  -WM 3  -DK     Standing up from a chair using your arms (e.g., wheelchair, bedside chair)? 3  -WM 3  -DK     Climbing 3-5 steps with a railing? 3  -WM 2  -DK     To walk in hospital room? 3  -WM 3  -DK     AM-PAC 6 Clicks Score (PT) 20  -WM 19  -DK        Functional Assessment    Outcome Measure Options -- AM-PAC 6 Clicks Basic Mobility (PT)  -DK           User Key  (r) = Recorded By, (t) = Taken By, (c) = Cosigned By    Initials Name Provider Type     Justyn Way PTA Physical Therapist Assistant    Juana Russell PTA Physical Therapist Assistant                 Time Calculation:    PT Charges     Row Name 01/04/23 1154             Time Calculation    PT Received On 01/04/23  -WM         Timed Charges    43081 - PT Therapeutic Exercise Minutes 12  -WM      32636 - Gait Training Minutes  8  -WM      59956 - PT Therapeutic Activity Minutes 4  -WM         Total Minutes    Timed Charges Total Minutes 24  -WM       Total Minutes 24  -WM            User Key  (r) = Recorded By, (t) = Taken By, (c) = Cosigned By    Initials Name Provider Type    Justyn Neil PTA Physical Therapist Assistant              Therapy Charges for Today     Code Description Service Date Service Provider Modifiers Qty    41798045168 HC PT THER PROC EA 15 MIN 1/4/2023 Justyn Way PTA GP 1    38047023477 HC GAIT TRAINING EA 15 MIN 1/4/2023 Justyn Way PTA GP 1          PT G-Codes  Outcome Measure Options: AM-PAC  6 Clicks Basic Mobility (PT)  -[facility] 6 Clicks Score (PT): 20    Justyn Way, PTA  1/4/2023

## 2023-01-04 NOTE — PROGRESS NOTES
[facility]     Progress Note    Patient Name: Justyn Galo  : 1940  MRN: [id number]  Primary Care Physician:  Martin Monahan MD  Date of admission: 2022 12:18 AM       Subjective     He is alert and oriented to person and place.  Continues to be unsteady on his feet and PT continues to recommend rehab placement.   arranging.  Nursing reports intermittent disorientation, reorients easily.     Review of Systems:    Review of Systems   Constitutional: Positive for activity change and fatigue. Negative for appetite change, chills and fever.   Neurological: Positive for weakness.   All other systems reviewed and are negative.          Objective   Objective     Vitals:   Vitals:    23 1607 23 1917 23 0230 23 0700   BP: 135/95 144/96 143/81 121/86   BP Location: Left arm Right arm Right arm Right arm   Patient Position: Sitting Lying Sitting Lying   Pulse: 98 92 58 81   Resp: 18 18 20 18   Temp: 97.7 °F (36.5 °C) 97.9 °F (36.6 °C) 97.7 °F (36.5 °C) 97.8 °F (36.6 °C)   TempSrc: Oral Oral Oral Oral   SpO2: 95% 95% 98% 97%   Weight:       Height:              Physical Exam:  Vitals and nursing note reviewed.     Constitutional:      Alert, cooperative, responsive, and conversant.  No acute distress.     HENT:      Normocephalic and atraumatic, no nasal congestion, discharge, mucous membranes are moist, no OP erythema  Eyes:      PERRLA, no scleral icterus, no conjunctival injection, no eye discharge  Neck:     Supple, no thyromegaly, no lymphadenopathy, trachea midline, no elevated JVD  Cardiovascular:      RRR, no murmurs, rubs or gallops. Palpable pedal pulses bilaterally. No BLE edema.   Pulmonary:      CTAB. Pulmonary effort is normal and unlabored. No respiratory distress.    Abdominal:      Bowel sounds active, soft, nontender, non distended, no organomegaly  Musculoskeletal:         No muscle wasting, tenderness or joint swelling.  Generalized  weakness.  Skin:     Warm and dry, cap refill less than 3 seconds. No clubbing, cyanosis, jaundice, erythema, rashes or ecchymosis.   Neurological:      AAOx2, speech clear, no focal deficit, strength equal bilaterally in all extremities, cranial nerves grossly intact  Psychiatric:         Mood and affect normal.  Behavior normal.         Result Review    Result Review:  I have personally reviewed the results from the time of this admission to 9/20/2021 18:13 EDT and agree with these findings:  []  Laboratory  []  Microbiology  []  Radiology  []  EKG/Telemetry   []  Cardiology/Vascular   []  Pathology  []  Old records  []  Other:     Assessment/Plan   Assessment / Plan       Active Hospital Problems:  Active Hospital Problems    Diagnosis    • **Atrial fibrillation with rapid ventricular response (HCC)    • Iron deficiency anemia    • Hypertension    • Atrial fibrillation, persistent     • Hypothyroidism    • History of CVA (cerebrovascular accident)          Plan:   Continue plan for rehab placement.   messaged today for status update.  Continue PT  I have personally reviewed the information and agree with plan  Electronically signed by KAYLEY Escamilla, 01/04/23, 9:26 AM EST.

## 2023-01-05 RX ADMIN — RIVAROXABAN 20 MG: 20 TABLET, FILM COATED ORAL at 17:56

## 2023-01-05 RX ADMIN — CARVEDILOL 25 MG: 25 TABLET, FILM COATED ORAL at 09:04

## 2023-01-05 RX ADMIN — TAMSULOSIN HYDROCHLORIDE 0.4 MG: 0.4 CAPSULE ORAL at 17:56

## 2023-01-05 RX ADMIN — FAMOTIDINE 40 MG: 20 TABLET, FILM COATED ORAL at 09:01

## 2023-01-05 RX ADMIN — FINASTERIDE 5 MG: 5 TABLET, FILM COATED ORAL at 09:00

## 2023-01-05 RX ADMIN — ESCITALOPRAM OXALATE 20 MG: 10 TABLET ORAL at 09:01

## 2023-01-05 RX ADMIN — LEVOTHYROXINE SODIUM 50 MCG: 25 TABLET ORAL at 05:43

## 2023-01-05 RX ADMIN — CARVEDILOL 25 MG: 25 TABLET, FILM COATED ORAL at 20:10

## 2023-01-05 NOTE — SIGNIFICANT NOTE
01/05/23 1109   Plan   Plan [facility] pre-cert denied.  to follow up with patient spouse to let her know of the denial and see if she would be interested in the patient going to a skilled nursing facility instead.

## 2023-01-05 NOTE — PLAN OF CARE
Goal Outcome Evaluation:         Pt continues to be confused, he does have a bed alarm on. Pt has rested in the bed most of the day.  No distress noted, no needs voiced at this time. Call light within reach.

## 2023-01-05 NOTE — PROGRESS NOTES
[facility]     Progress Note    Patient Name: Justyn Galo  : 1940  MRN: [id number]  Primary Care Physician:  Martin Monahan MD  Date of admission: 2022    Subjective   Patient is rate controlled  Patient has intermittent confusion  Awaiting placement    Facility-Administered Medications as of 2023   Medication Dose Route Frequency Provider Last Rate Last Admin   • acetaminophen (TYLENOL) tablet 650 mg  650 mg Oral Q4H PRN Vazquez Fermin MD   650 mg at 23 0836    Or   • acetaminophen (TYLENOL) 160 MG/5ML solution 650 mg  650 mg Oral Q4H PRN Vazquez Fermin MD        Or   • acetaminophen (TYLENOL) suppository 650 mg  650 mg Rectal Q4H PRN Vazquez Fermin MD       • [] ALPRAZolam (XANAX) tablet 0.5 mg  0.5 mg Oral Q8H PRN Vazquez Fermin MD   0.5 mg at 234   • aluminum-magnesium hydroxide-simethicone (MAALOX MAX) 400-400-40 MG/5ML suspension 15 mL  15 mL Oral Q6H PRN Vazquez Fermin MD       • sennosides-docusate (PERICOLACE) 8.6-50 MG per tablet 2 tablet  2 tablet Oral BID Vazquez Fermin MD   2 tablet at 23 0849    And   • polyethylene glycol (MIRALAX) packet 17 g  17 g Oral Daily PRN Vazquez Fermin MD        And   • bisacodyl (DULCOLAX) EC tablet 5 mg  5 mg Oral Daily PRN Vazquez Fermin MD        And   • bisacodyl (DULCOLAX) suppository 10 mg  10 mg Rectal Daily PRN Vazquez Fermin MD       • carvedilol (COREG) tablet 25 mg  25 mg Oral BID Vazquez Fermin MD   25 mg at 23 2102   • [COMPLETED] dilTIAZem (CARDIZEM) injection 10 mg  10 mg Intravenous Once Robert Velazquez DO   10 mg at 12/29/22 0224   • escitalopram (LEXAPRO) tablet 20 mg  20 mg Oral Daily Vazquez Fermin MD   20 mg at 23 0848   • famotidine (PEPCID) tablet 40 mg  40 mg Oral Daily Vazquez Fermin MD   40 mg at 23 0848   • ferric gluconate (FERRLECIT) 250 MG in sodium chloride 0.9% 250 mL IVPB  250 mg Intravenous  Daily Jimmie Douglas MD       • finasteride (PROSCAR) tablet 5 mg  5 mg Oral Daily Vazquez Fermin MD   5 mg at 23 0849   • [] HYDROcodone-acetaminophen (NORCO)  MG per tablet 1 tablet  1 tablet Oral Q4H PRN Vazquez Fermin MD       • [] HYDROcodone-acetaminophen (NORCO) 5-325 MG per tablet 1 tablet  1 tablet Oral Q4H PRN Vazquez Fermin MD       • [] HYDROmorphone (DILAUDID) injection 0.5 mg  0.5 mg Intravenous Q2H PRN Vazquez Fermin MD        And   • naloxone (NARCAN) injection 0.4 mg  0.4 mg Intravenous Q5 Min PRN Vazquez Fermin MD       • [COMPLETED] lactated ringers bolus 500 mL  500 mL Intravenous Once Vazquez Fermin MD   Stopped at 22 1326   • levothyroxine (SYNTHROID, LEVOTHROID) tablet 50 mcg  50 mcg Oral Q AM Vazquez Fermin MD   50 mcg at 23 0543   • melatonin tablet 5 mg  5 mg Oral Nightly PRN Vazquez Fermin MD   5 mg at 23 2116   • ondansetron (ZOFRAN) injection 4 mg  4 mg Intravenous Q6H PRN Vazquez Fermin MD       • rivaroxaban (XARELTO) tablet 20 mg  20 mg Oral Daily With Dinner Vazquez Fermin MD   20 mg at 23 1919   • sodium chloride 0.9 % flush 10 mL  10 mL Intravenous PRN Vazquez Fermin MD   10 mL at 22 2057   • sodium chloride 0.9 % flush 10 mL  10 mL Intravenous PRN Vazquez Fermin MD   10 mL at 22 1530   • sodium chloride 0.9 % flush 10 mL  10 mL Intravenous Q12H Vazquez Fermin MD   10 mL at 23 0837   • sodium chloride 0.9 % infusion 40 mL  40 mL Intravenous PRN Vazquez Fermin MD       • tamsulosin (FLOMAX) 24 hr capsule 0.4 mg  0.4 mg Oral Daily With Dinner Vazquez Fermin MD   0.4 mg at 23          Review of Systems  Constitutional:        Weakness tiredness fatigue  Eyes:                       No blurry vision, eye discharge, eye irritation, eye pain  HEENT:                   No acute hair loss, earache and discharge,  nasal congestion or discharge, sore throat, postnasal drip  Respiratory:           No shortness of breath coughing sputum production wheezing hemoptysis pleuritic chest pain  Cardiovascular:     No chest pain, orthopnea, PND, dizziness, palpitation, lower extremity edema  Gastrointestinal:   No nausea vomiting diarrhea abdominal pain constipation  Genitourinary:       No urinary incontinence, hesitancy, frequency, urgency, dysuria  Hematologic:         No bruising, bleeding, pallor, lymphadenopathy  Endocrine:            No coldness, hot flashes, polyuria, abnormal hair growth  Musculoskeletal:    No body pains, aches, arthritic pains, muscle pain ,muscle wasting  Psychiatric:          No low or high mood, anxiety, hallucinations, delusions  Skin.                      No rash, ulcers, bruising, itching  Neurological:        No confusion, headache, focal weakness, numbness, dysphasia    Objective   Objective     Vitals:   Temp:  [97.6 °F (36.4 °C)-98.3 °F (36.8 °C)] 98 °F (36.7 °C)  Heart Rate:  [71-97] 80  Resp:  [16-18] 16  BP: (125-147)/() 125/72  Physical Exam    Constitutional: Awake, alert responsive, conversant, no obvious distress              Psychiatric:  Appropriate affect, cooperative   Neurologic:  Awake alert ,oriented x2, strength symmetric in all extremities, Cranial Nerves grossly intact to confrontation, speech clear   Eyes:   PERRLA, sclerae anicteric, no conjunctival injection   HEENT:  Moist mucous membranes, no nasal or eye discharge, no throat congestion   Neck:   Supple, no thyromegaly, no lymphadenopathy, trachea midline, no elevated JVD   Respiratory:  Clear to auscultation bilaterally, nonlabored respirations    Cardiovascular: RRR, no murmurs, rubs, or gallops, palpable pedal pulses bilaterally, No bilateral ankle edema   Gastrointestinal: Positive bowel sounds, soft, nontender, nondistended, no organomegaly   Musculoskeletal:  No clubbing or cyanosis to extremities,muscle wasting,  joint swelling, muscle weakness             Skin:                      No rashes, bruising, skin ulcers, petechiae or ecchymosis    Result Review    Result Review:  I have personally reviewed the results from the time of this admission to 1/5/2023 07:42 EST and agree with these findings:  [x]  Laboratory  [x]  Microbiology  [x]  Radiology  [x]  EKG/Telemetry   [x]  Cardiology/Vascular   [x]  Pathology  [x]  Old records  []  Other:    Assessment & Plan   Assessment / Plan       Active Hospital Problems:  Active Hospital Problems    Diagnosis    • **Atrial fibrillation with rapid ventricular response (HCC)    • Iron deficiency anemia    • Hypertension    • Atrial fibrillation, persistent     • Hypothyroidism    • History of CVA (cerebrovascular accident)      82 y.o. male with past medical history of hypothyroidism, chronic A. fib on anticoagulation, CVA with left-sided residual weakness, anxiety/depression, BPH who came in due to ongoing weakness and dizziness for the last few days noted to be with A. fib with RVR.  It resolved quickly with diltiazem stopped on 12/29     Plan:   Carvedilol 25 twice daily  Continue levothyroxine 50.  TSH was normal  Continue Xarelto 20 daily, tamsulosin 0.4 daily and finasteride 5 daily and escitalopram 20 daily  Will hold off cardiology consult at this time  PT recommended inpatient rehab.  Awaiting placement for rehab

## 2023-01-05 NOTE — PLAN OF CARE
Goal Outcome Evaluation:  Plan of Care Reviewed With: patient        Progress: no change  Outcome Evaluation: Patient is AOx4 with intermittent confusion overnight, easily reoriented. Ambulates with one person assistance to the bathroom. Difficulty with strict I&O due to incontinence. Anticipates discharge to rehab pending placement.

## 2023-01-06 PROCEDURE — 97116 GAIT TRAINING THERAPY: CPT

## 2023-01-06 PROCEDURE — 97530 THERAPEUTIC ACTIVITIES: CPT

## 2023-01-06 RX ADMIN — ESCITALOPRAM OXALATE 20 MG: 10 TABLET ORAL at 08:39

## 2023-01-06 RX ADMIN — RIVAROXABAN 20 MG: 20 TABLET, FILM COATED ORAL at 18:19

## 2023-01-06 RX ADMIN — LEVOTHYROXINE SODIUM 50 MCG: 25 TABLET ORAL at 05:15

## 2023-01-06 RX ADMIN — Medication 5 MG: at 20:17

## 2023-01-06 RX ADMIN — SENNOSIDES AND DOCUSATE SODIUM 2 TABLET: 8.6; 5 TABLET ORAL at 20:17

## 2023-01-06 RX ADMIN — FINASTERIDE 5 MG: 5 TABLET, FILM COATED ORAL at 08:39

## 2023-01-06 RX ADMIN — FAMOTIDINE 40 MG: 20 TABLET, FILM COATED ORAL at 08:39

## 2023-01-06 RX ADMIN — CARVEDILOL 25 MG: 25 TABLET, FILM COATED ORAL at 08:45

## 2023-01-06 RX ADMIN — TAMSULOSIN HYDROCHLORIDE 0.4 MG: 0.4 CAPSULE ORAL at 18:19

## 2023-01-06 RX ADMIN — CARVEDILOL 25 MG: 25 TABLET, FILM COATED ORAL at 20:17

## 2023-01-06 NOTE — THERAPY TREATMENT NOTE
"Acute Care - Physical Therapy Progress Note   [facility]     Patient Name: Justyn Galo  : 1940  MRN: [id number]  Today's Date: 2023      Visit Dx:     ICD-10-CM ICD-9-CM   1. Atrial fibrillation with rapid ventricular response (HCC)  I48.91 427.31   2. Generalized weakness  R53.1 780.79   3. Oropharyngeal dysphagia  R13.12 787.22   4. Difficulty walking  R26.2 719.7     Patient Active Problem List   Diagnosis   • Atrial fibrillation, persistent    • Hypertension   • History of CVA (cerebrovascular accident)   • Hypothyroidism   • Gross hematuria   • Atrial fibrillation with rapid ventricular response (HCC)   • Iron deficiency anemia     Past Medical History:   Diagnosis Date   • Atrial fibrillation, persistent  10/14/2021   • BPH (benign prostatic hyperplasia)    • Disease of thyroid gland    • Essential hypertension 10/14/2021   • Falls frequently     SPOUSE REPORTS MULTIPLE FALLS \"EVERY WEEK. I'VE LOST COUNT.\"   • Generalized weakness    • History of CVA (cerebrovascular accident) 2012    RESIDUAL OF LEFT SIDE WEAKNESS   • Paroxysmal atrial fibrillation 10/14/2021   • Runny nose     CHRONIC RUNNY NOSE REPORTED PER SPOUSE.   • Urgency of urination     WEARS BRIEFS AT HOME     Past Surgical History:   Procedure Laterality Date   • LEG SURGERY Right      PT Assessment (last 12 hours)     PT Evaluation and Treatment     Row Name 23 1300          Physical Therapy Time and Intention    Subjective Information no complaints  -CS     Document Type therapy note (daily note)  -CS     Mode of Treatment individual therapy;physical therapy  -CS     Patient Effort good  -CS     Symptoms Noted During/After Treatment none  -CS     Row Name 23 1300          Sit-Stand Transfer    Sit-Stand [place] (Transfers) verbal cues;contact guard;1 person assist  -CS     Assistive Device (Sit-Stand Transfers) walker, front-wheeled  -CS     Row Name 23 1300          Stand-Sit Transfer    Stand-Sit " [place] (Transfers) contact guard;1 person assist  -CS     Assistive Device (Stand-Sit Transfers) walker, front-wheeled  -CS     Row Name 01/06/23 1300          Gait/Stairs (Locomotion)    [place] Level (Gait) contact guard;1 person assist  -CS     Assistive Device (Gait) walker, front-wheeled  -CS     Distance in Feet (Gait) 500  -CS     Pattern (Gait) 4-point;step-through  -CS     Deviations/Abnormal Patterns (Gait) gait speed decreased;stride length decreased  -CS     Bilateral Gait Deviations forward flexed posture  -CS     Row Name             Wound 01/04/23 0800 scalp Skin Tear    Wound - Properties Group Placement Date: 01/04/23  -ES Placement Time: 0800  -ES Present on Hospital Admission: N  -ES Location: scalp  -ES Primary Wound Type: Skin tear  -ES    Retired Wound - Properties Group Placement Date: 01/04/23  -ES Placement Time: 0800  -ES Present on Hospital Admission: N  -ES Location: scalp  -ES Primary Wound Type: Skin tear  -ES    Retired Wound - Properties Group Date first assessed: 01/04/23  -ES Time first assessed: 0800  -ES Present on Hospital Admission: N  -ES Location: scalp  -ES Primary Wound Type: Skin tear  -ES    Row Name 01/06/23 1300          Positioning and Restraints    Pre-Treatment Position in bed  -CS     Post Treatment Position other  -CS     Other Position --  sitting on side of bed with bed alarm activated and call button within reach  -CS     Row Name 01/06/23 1300          Progress Summary (PT)    Progress Toward Functional Goals (PT) progress toward functional goals is good  -CS           User Key  (r) = Recorded By, (t) = Taken By, (c) = Cosigned By    Initials Name Provider Type    Hitesh Cavanaugh PTA Physical Therapist Assistant    Radha Collazo RN Registered Nurse                  PT Recommendation and Plan     Progress Summary (PT)  Progress Toward Functional Goals (PT): progress toward functional goals is good   Outcome Measures     Row Name  01/06/23 1300 01/04/23 1159 01/03/23 1422       How much help from another person do you currently need...    Turning from your back to your side while in flat bed without using bedrails? 4  -CS 4  -WM 4  -DK    Moving from lying on back to sitting on the side of a flat bed without bedrails? 4  -CS 4  -WM 4  -DK    Moving to and from a bed to a chair (including a wheelchair)? 3  -CS 3  -WM 3  -DK    Standing up from a chair using your arms (e.g., wheelchair, bedside chair)? 4  -CS 3  -WM 3  -DK    Climbing 3-5 steps with a railing? 3  -CS 3  -WM 2  -DK    To walk in hospital room? 3  -CS 3  -WM 3  -DK    AM-PAC 6 Clicks Score (PT) 21  -CS 20  -WM 19  -DK       Functional Assessment    Outcome Measure Options AM-PAC 6 Clicks Basic Mobility (PT)  -CS -- AM-PAC 6 Clicks Basic Mobility (PT)  -DK          User Key  (r) = Recorded By, (t) = Taken By, (c) = Cosigned By    Initials Name Provider Type     Justyn Way, JIMMY Physical Therapist Assistant    Juana Russell PTA Physical Therapist Assistant    Hitesh Cavanaugh PTA Physical Therapist Assistant                 Time Calculation:    PT Charges     Row Name 01/06/23 1322             Time Calculation    Start Time 1152  -CS      PT Received On 01/06/23  -CS         Timed Charges    38902 - Gait Training Minutes  16  -CS      81999 - PT Therapeutic Activity Minutes 7  -CS         Total Minutes    Timed Charges Total Minutes 23  -CS       Total Minutes 23  -CS            User Key  (r) = Recorded By, (t) = Taken By, (c) = Cosigned By    Initials Name Provider Type    Hitesh Cavanaugh PTA Physical Therapist Assistant              Therapy Charges for Today     Code Description Service Date Service Provider Modifiers Qty    09907036943 HC GAIT TRAINING EA 15 MIN 1/6/2023 Hitesh Kingsley PTA GP 1    46229343547 HC PT THERAPEUTIC ACT EA 15 MIN 1/6/2023 Hitesh Kingsley PTA GP 1          PT G-Codes  Outcome Measure Options: AM-PAC 6 Clicks Basic Mobility  (PT)  AM-[facility] 6 Clicks Score (PT): 21    Hitesh Kingsley, PTA  1/6/2023

## 2023-01-06 NOTE — PROGRESS NOTES
[facility]     Progress Note    Patient Name: Justyn Galo  : 1940  MRN: [id number]  Primary Care Physician:  Martin Monahan MD  Date of admission: 2022    Subjective   Patient's precertification has been denied for rehab at [facility]  No major acute events overnight    Facility-Administered Medications as of 2023   Medication Dose Route Frequency Provider Last Rate Last Admin   • acetaminophen (TYLENOL) tablet 650 mg  650 mg Oral Q4H PRN Vazquez Fermin MD   650 mg at 23 0836    Or   • acetaminophen (TYLENOL) 160 MG/5ML solution 650 mg  650 mg Oral Q4H PRN Vazquez Fermin MD        Or   • acetaminophen (TYLENOL) suppository 650 mg  650 mg Rectal Q4H PRN Vazquez Fermin MD       • [] ALPRAZolam (XANAX) tablet 0.5 mg  0.5 mg Oral Q8H PRN Vazquez Fermin MD   0.5 mg at 23   • aluminum-magnesium hydroxide-simethicone (MAALOX MAX) 400-400-40 MG/5ML suspension 15 mL  15 mL Oral Q6H PRN Vazquez Fermin MD       • sennosides-docusate (PERICOLACE) 8.6-50 MG per tablet 2 tablet  2 tablet Oral BID Vazquez Fermin MD   2 tablet at 23 0849    And   • polyethylene glycol (MIRALAX) packet 17 g  17 g Oral Daily PRN Vazquez Fermin MD        And   • bisacodyl (DULCOLAX) EC tablet 5 mg  5 mg Oral Daily PRN Vazquez Fermin MD        And   • bisacodyl (DULCOLAX) suppository 10 mg  10 mg Rectal Daily PRN Vazquez Fermin MD       • carvedilol (COREG) tablet 25 mg  25 mg Oral BID Vazquez Fermin MD   25 mg at 23   • [COMPLETED] dilTIAZem (CARDIZEM) injection 10 mg  10 mg Intravenous Once Robert Velazquez DO   10 mg at 22 0224   • escitalopram (LEXAPRO) tablet 20 mg  20 mg Oral Daily Vazquez Fermin MD   20 mg at 23   • famotidine (PEPCID) tablet 40 mg  40 mg Oral Daily Vazquez Fermin MD   40 mg at 23   • [] ferric gluconate (FERRLECIT) 250 MG in sodium chloride 0.9% 250 mL  IVPB  250 mg Intravenous Daily Jimmie Douglas MD       • finasteride (PROSCAR) tablet 5 mg  5 mg Oral Daily Vazquez Fermin MD   5 mg at 23 0900   • [] HYDROcodone-acetaminophen (NORCO)  MG per tablet 1 tablet  1 tablet Oral Q4H PRN Vazquez Fermin MD       • [] HYDROcodone-acetaminophen (NORCO) 5-325 MG per tablet 1 tablet  1 tablet Oral Q4H PRN Vazquez Fermin MD       • [] HYDROmorphone (DILAUDID) injection 0.5 mg  0.5 mg Intravenous Q2H PRN Vazquez Fermin MD        And   • naloxone (NARCAN) injection 0.4 mg  0.4 mg Intravenous Q5 Min PRN Vazquez Fermin MD       • [COMPLETED] lactated ringers bolus 500 mL  500 mL Intravenous Once Vazquez Fermin MD   Stopped at 22 1326   • levothyroxine (SYNTHROID, LEVOTHROID) tablet 50 mcg  50 mcg Oral Q AM aVzquez Fermin MD   50 mcg at 23 0515   • melatonin tablet 5 mg  5 mg Oral Nightly PRN Vazquez Fermin MD   5 mg at 23 2116   • ondansetron (ZOFRAN) injection 4 mg  4 mg Intravenous Q6H PRN Vazquez Fermin MD       • rivaroxaban (XARELTO) tablet 20 mg  20 mg Oral Daily With Dinner Vazquez Fermin MD   20 mg at 23 1756   • sodium chloride 0.9 % flush 10 mL  10 mL Intravenous PRN Vazquez Fermin MD   10 mL at 22 2057   • sodium chloride 0.9 % flush 10 mL  10 mL Intravenous PRN Vazquez Fermin MD   10 mL at 22 1530   • sodium chloride 0.9 % flush 10 mL  10 mL Intravenous Q12H Vazquez Fermin MD   10 mL at 23 0837   • sodium chloride 0.9 % infusion 40 mL  40 mL Intravenous PRN Vazquez Fermin MD       • tamsulosin (FLOMAX) 24 hr capsule 0.4 mg  0.4 mg Oral Daily With Dinner Vazquez Fermin MD   0.4 mg at 23 8468          Review of Systems  Constitutional:        Weakness tiredness fatigue  Eyes:                       No blurry vision, eye discharge, eye irritation, eye pain  HEENT:                   No acute hair loss,  earache and discharge, nasal congestion or discharge, sore throat, postnasal drip  Respiratory:           No shortness of breath coughing sputum production wheezing hemoptysis pleuritic chest pain  Cardiovascular:     No chest pain, orthopnea, PND, dizziness, palpitation, lower extremity edema  Gastrointestinal:   No nausea vomiting diarrhea abdominal pain constipation  Genitourinary:       No urinary incontinence, hesitancy, frequency, urgency, dysuria  Hematologic:         No bruising, bleeding, pallor, lymphadenopathy  Endocrine:            No coldness, hot flashes, polyuria, abnormal hair growth  Musculoskeletal:    No body pains, aches, arthritic pains, muscle pain ,muscle wasting  Psychiatric:          No low or high mood, anxiety, hallucinations, delusions  Skin.                      No rash, ulcers, bruising, itching  Neurological:        No confusion, headache, focal weakness, numbness, dysphasia    Objective   Objective     Vitals:   Temp:  [97.8 °F (36.6 °C)-98.4 °F (36.9 °C)] 98.2 °F (36.8 °C)  Heart Rate:  [] 81  Resp:  [16-18] 18  BP: (129-153)/(84-99) 152/99  Physical Exam    Constitutional: Awake, alert responsive, conversant, no obvious distress, lying down comfortably              Psychiatric:  Appropriate affect, cooperative   Neurologic:  Awake alert ,oriented x2, strength symmetric in all extremities, Cranial Nerves grossly intact to confrontation, speech clear   Eyes:   PERRLA, sclerae anicteric, no conjunctival injection   HEENT:  Moist mucous membranes, no nasal or eye discharge, no throat congestion   Neck:   Supple, no thyromegaly, no lymphadenopathy, trachea midline, no elevated JVD   Respiratory:  Clear to auscultation bilaterally, nonlabored respirations    Cardiovascular: RRR, no murmurs, rubs, or gallops, palpable pedal pulses bilaterally, No bilateral ankle edema   Gastrointestinal: Positive bowel sounds, soft, nontender, nondistended, no organomegaly   Musculoskeletal:  No  clubbing or cyanosis to extremities,muscle wasting, joint swelling, muscle weakness             Skin:                      No rashes, bruising, skin ulcers, petechiae or ecchymosis    Result Review    Result Review:  I have personally reviewed the results from the time of this admission to 1/6/2023 07:51 EST and agree with these findings:  [x]  Laboratory  [x]  Microbiology  [x]  Radiology  [x]  EKG/Telemetry   [x]  Cardiology/Vascular   [x]  Pathology  [x]  Old records  []  Other:    Assessment & Plan   Assessment / Plan       Active Hospital Problems:  Active Hospital Problems    Diagnosis    • **Atrial fibrillation with rapid ventricular response (HCC)    • Iron deficiency anemia    • Hypertension    • Atrial fibrillation, persistent     • Hypothyroidism    • History of CVA (cerebrovascular accident)      82 y.o. male with past medical history of hypothyroidism, chronic A. fib on anticoagulation, CVA with left-sided residual weakness, anxiety/depression, BPH who came in due to ongoing weakness and dizziness for the last few days noted to be with A. fib with RVR.  It resolved quickly with diltiazem stopped on 12/29     Plan:   Carvedilol 25 twice daily  Continue levothyroxine 50.  TSH was normal  Continue Xarelto 20 daily, tamsulosin 0.4 daily and finasteride 5 daily and escitalopram 20 daily  Will hold off cardiology consult at this time  PT recommended inpatient rehab.  [facility] has denied his precertification.  To discuss further with family along with  for skilled nursing facility full

## 2023-01-06 NOTE — PLAN OF CARE
Goal Outcome Evaluation:      No complaints per pt. Pt ambulating with walker and standby support.

## 2023-01-06 NOTE — PLAN OF CARE
Goal Outcome Evaluation:  Plan of Care Reviewed With: patient           Outcome Evaluation: No changes or complaints this shift. continue plan of care. awaiting rehab placement.

## 2023-01-06 NOTE — CONSULTS
"Nutrition Services    Patient Name: Justyn Galo  YOB: 1940  MRN: [id number]  Admission date: 12/29/2022      CLINICAL NUTRITION ASSESSMENT      Reason for Assessment  Follow-up protocol, Identified at risk by screening criteria, MST score 2+, \"Unsure\" unintentional weight loss     H&P:    Past Medical History:   Diagnosis Date   • Atrial fibrillation, persistent  10/14/2021   • BPH (benign prostatic hyperplasia)    • Disease of thyroid gland    • Essential hypertension 10/14/2021   • Falls frequently     SPOUSE REPORTS MULTIPLE FALLS \"EVERY WEEK. I'VE LOST COUNT.\"   • Generalized weakness    • History of CVA (cerebrovascular accident) 2012    RESIDUAL OF LEFT SIDE WEAKNESS   • Paroxysmal atrial fibrillation 10/14/2021   • Runny nose     CHRONIC RUNNY NOSE REPORTED PER SPOUSE.   • Urgency of urination     WEARS BRIEFS AT HOME        Current Problems:   Active Hospital Problems    Diagnosis    • **Atrial fibrillation with rapid ventricular response (HCC)    • Iron deficiency anemia    • Hypertension    • Atrial fibrillation, persistent     • Hypothyroidism    • History of CVA (cerebrovascular accident)         Nutrition/Diet History         Narrative     RD following pt. Pt on mechanical soft, ground diet per SLP. Pt's PO intake is adequate evidenced by % of meals. Pt's wt has been stable x 1 yr. Current wt on 1/6 shows 7# wt loss. RD will continue to monitor per protocol.      Anthropometrics        Current Height, Weight Height: 185.4 cm (73\")  Weight: 89 kg (196 lb 3.4 oz)   Current BMI Body mass index is 25.89 kg/m².       Weight Hx  Wt Readings from Last 30 Encounters:   01/06/23 1132 89 kg (196 lb 3.4 oz)   01/06/23 0624 86.6 kg (190 lb 14.7 oz)   01/05/23 0546 87.8 kg (193 lb 9 oz)   01/03/23 0500 92.5 kg (203 lb 14.8 oz)   12/30/22 0500 92.4 kg (203 lb 11.3 oz)   12/29/22 0021 93.9 kg (207 lb)   07/20/22 1004 92.6 kg (204 lb 3.2 oz)   04/15/22 0900 92.1 kg (203 lb)   01/21/22 0956 " 95.2 kg (209 lb 12.8 oz)   01/20/22 1009 91.6 kg (202 lb)   12/31/21 1828 93.4 kg (206 lb)   12/28/21 0851 93.4 kg (206 lb)   10/14/21 0940 93.4 kg (206 lb)   09/30/21 1216 93 kg (205 lb)   09/30/21 1129 93 kg (205 lb)   03/16/21 0000 93.2 kg (205 lb 8 oz)   03/04/21 0000 94.3 kg (208 lb)   09/09/20 0000 85.7 kg (189 lb)   07/31/20 0000 85.8 kg (189 lb 4 oz)   07/23/20 0000 88.9 kg (196 lb)   03/13/20 0000 93 kg (205 lb)   12/19/19 0000 91.6 kg (202 lb)   06/07/19 0000 90.7 kg (200 lb)   02/26/19 0000 91.2 kg (201 lb)   10/30/18 0000 86.6 kg (191 lb)   03/29/18 0000 89.4 kg (197 lb)   01/12/18 0000 88.5 kg (195 lb)            Wt Change Observation Wt steady for 1 yr. -3% x 3 days--clinically significant      Estimated/Assessed Needs       Energy Requirements 30-35 IBW   EST Needs (kcal/day) 6980-1051       Protein Requirements 1.25-1.5 IBW   EST Daily Needs (g/day) 99.9-120       Fluid Requirements  ml per kcal    Estimated Needs (mL/day) 2300      Labs/Medications         Pertinent Labs Reviewed.   Results from last 7 days   Lab Units 12/31/22  0458   SODIUM mmol/L 138   POTASSIUM mmol/L 3.8   CHLORIDE mmol/L 102   CO2 mmol/L 27.4   BUN mg/dL 16   CREATININE mg/dL 0.93   CALCIUM mg/dL 9.3   GLUCOSE mg/dL 103*     Results from last 7 days   Lab Units 12/31/22  0458   MAGNESIUM mg/dL 1.9   PHOSPHORUS mg/dL 3.4   HEMOGLOBIN g/dL 11.9*   HEMATOCRIT % 35.2*     No results found for: COVID19  No results found for: HGBA1C      Pertinent Medications Reviewed.     Current Nutrition Orders & Evaluation of Intake       Oral Nutrition     Current PO Diet Diet: Cardiac Diets; Healthy Heart (2-3 Na+); Texture: Mechanical Ground (NDD 2); Fluid Consistency: Thin (IDDSI 0)   Supplement No active supplement orders       Malnutrition Severity Assessment                Nutrition Diagnosis         Nutrition Dx Problem 1 No nutrition diagnosis at this time        Nutrition Intervention         F/u for weight     Medical Nutrition  Therapy/Nutrition Education          Learner     Readiness Patient  N/A     Method     Response N/A  N/A     Monitor/Evaluation        Monitor Per protocol, PO intake, Weight       Nutrition Discharge Plan         No nutrition discharge needs identified at this time       Electronically signed by:  Yudy Felix RD  01/06/23 14:34 EST

## 2023-01-07 RX ADMIN — RIVAROXABAN 20 MG: 20 TABLET, FILM COATED ORAL at 17:30

## 2023-01-07 RX ADMIN — TAMSULOSIN HYDROCHLORIDE 0.4 MG: 0.4 CAPSULE ORAL at 17:30

## 2023-01-07 RX ADMIN — ESCITALOPRAM OXALATE 20 MG: 10 TABLET ORAL at 09:04

## 2023-01-07 RX ADMIN — Medication 5 MG: at 20:54

## 2023-01-07 RX ADMIN — CARVEDILOL 25 MG: 25 TABLET, FILM COATED ORAL at 09:04

## 2023-01-07 RX ADMIN — LEVOTHYROXINE SODIUM 50 MCG: 25 TABLET ORAL at 04:54

## 2023-01-07 RX ADMIN — FAMOTIDINE 40 MG: 20 TABLET, FILM COATED ORAL at 09:04

## 2023-01-07 RX ADMIN — CARVEDILOL 25 MG: 25 TABLET, FILM COATED ORAL at 20:54

## 2023-01-07 RX ADMIN — ACETAMINOPHEN 650 MG: 325 TABLET ORAL at 20:54

## 2023-01-07 RX ADMIN — SENNOSIDES AND DOCUSATE SODIUM 2 TABLET: 8.6; 5 TABLET ORAL at 09:04

## 2023-01-07 RX ADMIN — FINASTERIDE 5 MG: 5 TABLET, FILM COATED ORAL at 09:04

## 2023-01-07 NOTE — PROGRESS NOTES
[facility]     Progress Note    Patient Name: Justyn Galo  : 1940  MRN: [id number]  Primary Care Physician:  Martin Monahan MD  Date of admission: 2022    Subjective   Attempts are being made to try other rehab place  No major acute events overnight    Facility-Administered Medications as of 2023   Medication Dose Route Frequency Provider Last Rate Last Admin   • acetaminophen (TYLENOL) tablet 650 mg  650 mg Oral Q4H PRN Vazquez Fermin MD   650 mg at 23 0836    Or   • acetaminophen (TYLENOL) 160 MG/5ML solution 650 mg  650 mg Oral Q4H PRN Vazquez Fermin MD        Or   • acetaminophen (TYLENOL) suppository 650 mg  650 mg Rectal Q4H PRN Vazquez Fermin MD       • [] ALPRAZolam (XANAX) tablet 0.5 mg  0.5 mg Oral Q8H PRN Vazquez Fermin MD   0.5 mg at 234   • aluminum-magnesium hydroxide-simethicone (MAALOX MAX) 400-400-40 MG/5ML suspension 15 mL  15 mL Oral Q6H PRN Vazquez Fermin MD       • sennosides-docusate (PERICOLACE) 8.6-50 MG per tablet 2 tablet  2 tablet Oral BID Vazquez Fermin MD   2 tablet at 23    And   • polyethylene glycol (MIRALAX) packet 17 g  17 g Oral Daily PRN Vazquez Fermin MD        And   • bisacodyl (DULCOLAX) EC tablet 5 mg  5 mg Oral Daily PRN Vazquez Fermin MD        And   • bisacodyl (DULCOLAX) suppository 10 mg  10 mg Rectal Daily PRN Vazquez Fermin MD       • carvedilol (COREG) tablet 25 mg  25 mg Oral BID Vazquez Fermin MD   25 mg at 23   • [COMPLETED] dilTIAZem (CARDIZEM) injection 10 mg  10 mg Intravenous Once Robert Velazquez DO   10 mg at 22 0224   • escitalopram (LEXAPRO) tablet 20 mg  20 mg Oral Daily Vazquez Fermin MD   20 mg at 23 0839   • famotidine (PEPCID) tablet 40 mg  40 mg Oral Daily Vazquez Fermin MD   40 mg at 23 0839   • [] ferric gluconate (FERRLECIT) 250 MG in sodium chloride 0.9% 250 mL IVPB  250 mg  Intravenous Daily Jimmie Douglas MD       • finasteride (PROSCAR) tablet 5 mg  5 mg Oral Daily Vazquez Fermin MD   5 mg at 23 0839   • [] HYDROcodone-acetaminophen (NORCO)  MG per tablet 1 tablet  1 tablet Oral Q4H PRN Vazquez Fermin MD       • [] HYDROcodone-acetaminophen (NORCO) 5-325 MG per tablet 1 tablet  1 tablet Oral Q4H PRN Vazquez Fermin MD       • [] HYDROmorphone (DILAUDID) injection 0.5 mg  0.5 mg Intravenous Q2H PRN Vazquez Fermin MD        And   • naloxone (NARCAN) injection 0.4 mg  0.4 mg Intravenous Q5 Min PRN Vazquez Fermin MD       • [COMPLETED] lactated ringers bolus 500 mL  500 mL Intravenous Once Vazquez Fermin MD   Stopped at 22 1326   • levothyroxine (SYNTHROID, LEVOTHROID) tablet 50 mcg  50 mcg Oral Q AM Vazquez Fermin MD   50 mcg at 23 0454   • melatonin tablet 5 mg  5 mg Oral Nightly PRN Vazquez Fermin MD   5 mg at 23 2017   • ondansetron (ZOFRAN) injection 4 mg  4 mg Intravenous Q6H PRN Vazquez Fermin MD       • rivaroxaban (XARELTO) tablet 20 mg  20 mg Oral Daily With Dinner Vazquez Fermin MD   20 mg at 23 1819   • sodium chloride 0.9 % flush 10 mL  10 mL Intravenous PRN Vazquez Fermin MD   10 mL at 22 2057   • sodium chloride 0.9 % flush 10 mL  10 mL Intravenous PRN Vazquez Fermin MD   10 mL at 22 1530   • sodium chloride 0.9 % flush 10 mL  10 mL Intravenous Q12H Vazquez Fermin MD   10 mL at 23 0837   • sodium chloride 0.9 % infusion 40 mL  40 mL Intravenous PRN Vazquez Fermin MD       • tamsulosin (FLOMAX) 24 hr capsule 0.4 mg  0.4 mg Oral Daily With Dinner Vazquez Fermin MD   0.4 mg at 23          Review of Systems  Constitutional:        Weakness tiredness fatigue  Eyes:                       No blurry vision, eye discharge, eye irritation, eye pain  HEENT:                   No acute hair loss, earache and  discharge, nasal congestion or discharge, sore throat, postnasal drip  Respiratory:           No shortness of breath coughing sputum production wheezing hemoptysis pleuritic chest pain  Cardiovascular:     No chest pain, orthopnea, PND, dizziness, palpitation, lower extremity edema  Gastrointestinal:   No nausea vomiting diarrhea abdominal pain constipation  Genitourinary:       No urinary incontinence, hesitancy, frequency, urgency, dysuria  Hematologic:         No bruising, bleeding, pallor, lymphadenopathy  Endocrine:            No coldness, hot flashes, polyuria, abnormal hair growth  Musculoskeletal:    No body pains, aches, arthritic pains, muscle pain ,muscle wasting  Psychiatric:          No low or high mood, anxiety, hallucinations, delusions  Skin.                      No rash, ulcers, bruising, itching  Neurological:        No confusion, headache, focal weakness, numbness, dysphasia    Objective   Objective     Vitals:   Temp:  [97.4 °F (36.3 °C)-98.4 °F (36.9 °C)] 97.9 °F (36.6 °C)  Heart Rate:  [] 103  Resp:  [18] 18  BP: (119-148)/(73-98) 140/98  Physical Exam    Constitutional: Awake, alert responsive, conversant, no obvious distress, lying down comfortably              Psychiatric:  Appropriate affect, cooperative   Neurologic:  Awake alert ,oriented x2, strength symmetric in all extremities, Cranial Nerves grossly intact to confrontation, speech clear   Eyes:   PERRLA, sclerae anicteric, no conjunctival injection   HEENT:  Moist mucous membranes, no nasal or eye discharge, no throat congestion   Neck:   Supple, no thyromegaly, no lymphadenopathy, trachea midline, no elevated JVD   Respiratory:  Clear to auscultation bilaterally, nonlabored respirations    Cardiovascular: RRR, no murmurs, rubs, or gallops, palpable pedal pulses bilaterally, No bilateral ankle edema   Gastrointestinal: Positive bowel sounds, soft, nontender, nondistended, no organomegaly   Musculoskeletal:  No clubbing or  cyanosis to extremities,muscle wasting, joint swelling, muscle weakness             Skin:                      No rashes, bruising, skin ulcers, petechiae or ecchymosis    Result Review    Result Review:  I have personally reviewed the results from the time of this admission to 1/7/2023 07:42 EST and agree with these findings:  [x]  Laboratory  [x]  Microbiology  [x]  Radiology  [x]  EKG/Telemetry   [x]  Cardiology/Vascular   [x]  Pathology  [x]  Old records  []  Other:    Assessment & Plan   Assessment / Plan       Active Hospital Problems:  Active Hospital Problems    Diagnosis    • **Atrial fibrillation with rapid ventricular response (HCC)    • Iron deficiency anemia    • Hypertension    • Atrial fibrillation, persistent     • Hypothyroidism    • History of CVA (cerebrovascular accident)      82 y.o. male with past medical history of hypothyroidism, chronic A. fib on anticoagulation, CVA with left-sided residual weakness, anxiety/depression, BPH who came in due to ongoing weakness and dizziness for the last few days noted to be with A. fib with RVR.  It resolved quickly with diltiazem stopped on 12/29     Plan:   Carvedilol 25 twice daily  Continue levothyroxine 50.  TSH was normal  Continue Xarelto 20 daily, tamsulosin 0.4 daily and finasteride 5 daily and escitalopram 20 daily  Will hold off cardiology consult at this time  PT recommended inpatient rehab.  [facility] denied [facility] precertification.  Social work attempting to find another rehab place

## 2023-01-08 RX ADMIN — LEVOTHYROXINE SODIUM 50 MCG: 25 TABLET ORAL at 05:54

## 2023-01-08 RX ADMIN — TAMSULOSIN HYDROCHLORIDE 0.4 MG: 0.4 CAPSULE ORAL at 17:26

## 2023-01-08 RX ADMIN — CARVEDILOL 25 MG: 25 TABLET, FILM COATED ORAL at 20:39

## 2023-01-08 RX ADMIN — FAMOTIDINE 40 MG: 20 TABLET, FILM COATED ORAL at 09:10

## 2023-01-08 RX ADMIN — RIVAROXABAN 20 MG: 20 TABLET, FILM COATED ORAL at 17:26

## 2023-01-08 RX ADMIN — SENNOSIDES AND DOCUSATE SODIUM 2 TABLET: 8.6; 5 TABLET ORAL at 09:10

## 2023-01-08 RX ADMIN — CARVEDILOL 25 MG: 25 TABLET, FILM COATED ORAL at 09:10

## 2023-01-08 RX ADMIN — SENNOSIDES AND DOCUSATE SODIUM 2 TABLET: 8.6; 5 TABLET ORAL at 20:39

## 2023-01-08 RX ADMIN — ESCITALOPRAM OXALATE 20 MG: 10 TABLET ORAL at 09:10

## 2023-01-08 RX ADMIN — FINASTERIDE 5 MG: 5 TABLET, FILM COATED ORAL at 09:10

## 2023-01-08 RX ADMIN — Medication 10 ML: at 20:39

## 2023-01-08 NOTE — PROGRESS NOTES
[facility]     Progress Note    Patient Name: Justyn Galo  : 1940  MRN: [id number]  Primary Care Physician:  Martin Monahan MD  Date of admission: 2022    Subjective   Attempts are being made to try other rehab place  No major acute events overnight  Patient vitally stable and intermittently confused    Facility-Administered Medications as of 2023   Medication Dose Route Frequency Provider Last Rate Last Admin   • acetaminophen (TYLENOL) tablet 650 mg  650 mg Oral Q4H PRN Vazquez Fermin MD   650 mg at 23    Or   • acetaminophen (TYLENOL) 160 MG/5ML solution 650 mg  650 mg Oral Q4H PRN Vazquez Fermin MD        Or   • acetaminophen (TYLENOL) suppository 650 mg  650 mg Rectal Q4H PRN Vazquez Fermin MD       • [] ALPRAZolam (XANAX) tablet 0.5 mg  0.5 mg Oral Q8H PRN Vazquez Fermin MD   0.5 mg at 23   • aluminum-magnesium hydroxide-simethicone (MAALOX MAX) 400-400-40 MG/5ML suspension 15 mL  15 mL Oral Q6H PRN Vazquez Fermin MD       • sennosides-docusate (PERICOLACE) 8.6-50 MG per tablet 2 tablet  2 tablet Oral BID Vazquez Fermin MD   2 tablet at 23 0904    And   • polyethylene glycol (MIRALAX) packet 17 g  17 g Oral Daily PRN Vazquez Fermin MD        And   • bisacodyl (DULCOLAX) EC tablet 5 mg  5 mg Oral Daily PRN Vazquez Fermin MD        And   • bisacodyl (DULCOLAX) suppository 10 mg  10 mg Rectal Daily PRN Vazquez Fermin MD       • carvedilol (COREG) tablet 25 mg  25 mg Oral BID Vazquez Fermin MD   25 mg at 23   • [COMPLETED] dilTIAZem (CARDIZEM) injection 10 mg  10 mg Intravenous Once Robert Velazquez DO   10 mg at 22 0224   • escitalopram (LEXAPRO) tablet 20 mg  20 mg Oral Daily Vazquez Fermin MD   20 mg at 23 09   • famotidine (PEPCID) tablet 40 mg  40 mg Oral Daily Vazquez Fermin MD   40 mg at 23 0904   • [] ferric gluconate (FERRLECIT)  250 MG in sodium chloride 0.9% 250 mL IVPB  250 mg Intravenous Daily Jimmie Douglas MD       • finasteride (PROSCAR) tablet 5 mg  5 mg Oral Daily Vazquez Fermin MD   5 mg at 23 0904   • [] HYDROcodone-acetaminophen (NORCO)  MG per tablet 1 tablet  1 tablet Oral Q4H PRN Vazquez Fermin MD       • [] HYDROcodone-acetaminophen (NORCO) 5-325 MG per tablet 1 tablet  1 tablet Oral Q4H PRN Vazquez Fermin MD       • [] HYDROmorphone (DILAUDID) injection 0.5 mg  0.5 mg Intravenous Q2H PRN Vazquez Fermin MD        And   • naloxone (NARCAN) injection 0.4 mg  0.4 mg Intravenous Q5 Min PRN Vazquez Fermin MD       • [COMPLETED] lactated ringers bolus 500 mL  500 mL Intravenous Once Vazquez Fermin MD   Stopped at 22 1326   • levothyroxine (SYNTHROID, LEVOTHROID) tablet 50 mcg  50 mcg Oral Q AM Vazquez Fermin MD   50 mcg at 23 0554   • melatonin tablet 5 mg  5 mg Oral Nightly PRN Vazquez Fermin MD   5 mg at 23 2054   • ondansetron (ZOFRAN) injection 4 mg  4 mg Intravenous Q6H PRN Vazquez Fermin MD       • rivaroxaban (XARELTO) tablet 20 mg  20 mg Oral Daily With Dinner Vazquez Fermin MD   20 mg at 23 1730   • sodium chloride 0.9 % flush 10 mL  10 mL Intravenous PRN Vazquez Fermin MD   10 mL at 22 2057   • sodium chloride 0.9 % flush 10 mL  10 mL Intravenous PRN Vazquez Fermin MD   10 mL at 22 1530   • sodium chloride 0.9 % flush 10 mL  10 mL Intravenous Q12H Vazquez Fermin MD   10 mL at 23 0837   • sodium chloride 0.9 % infusion 40 mL  40 mL Intravenous PRN Vazquez Fermin MD       • tamsulosin (FLOMAX) 24 hr capsule 0.4 mg  0.4 mg Oral Daily With Dinner Vazquez Fermin MD   0.4 mg at 23 5970          Review of Systems  Constitutional:        Weakness tiredness fatigue  Eyes:                       No blurry vision, eye discharge, eye irritation, eye pain  HEENT:                    No acute hair loss, earache and discharge, nasal congestion or discharge, sore throat, postnasal drip  Respiratory:           No shortness of breath coughing sputum production wheezing hemoptysis pleuritic chest pain  Cardiovascular:     No chest pain, orthopnea, PND, dizziness, palpitation, lower extremity edema  Gastrointestinal:   No nausea vomiting diarrhea abdominal pain constipation  Genitourinary:       No urinary incontinence, hesitancy, frequency, urgency, dysuria  Hematologic:         No bruising, bleeding, pallor, lymphadenopathy  Endocrine:            No coldness, hot flashes, polyuria, abnormal hair growth  Musculoskeletal:    No body pains, aches, arthritic pains, muscle pain ,muscle wasting  Psychiatric:          No low or high mood, anxiety, hallucinations, delusions  Skin.                      No rash, ulcers, bruising, itching  Neurological:        No confusion, headache, focal weakness, numbness, dysphasia    Objective   Objective     Vitals:   Temp:  [97.3 °F (36.3 °C)-97.7 °F (36.5 °C)] 97.3 °F (36.3 °C)  Heart Rate:  [] 97  Resp:  [16-18] 16  BP: (116-138)/(72-97) 123/77  Physical Exam    Constitutional: Awake, alert responsive, conversant, no obvious distress, lying down comfortably              Psychiatric:  Appropriate affect, cooperative   Neurologic:  Awake alert ,oriented x2, strength symmetric in all extremities, Cranial Nerves grossly intact to confrontation, speech clear   Eyes:   PERRLA, sclerae anicteric, no conjunctival injection   HEENT:  Moist mucous membranes, no nasal or eye discharge, no throat congestion   Neck:   Supple, no thyromegaly, no lymphadenopathy, trachea midline, no elevated JVD   Respiratory:  Clear to auscultation bilaterally, nonlabored respirations    Cardiovascular: RRR, no murmurs, rubs, or gallops, palpable pedal pulses bilaterally, No bilateral ankle edema   Gastrointestinal: Positive bowel sounds, soft, nontender, nondistended, no  organomegaly   Musculoskeletal:  No clubbing or cyanosis to extremities,muscle wasting, joint swelling, muscle weakness             Skin:                      No rashes, bruising, skin ulcers, petechiae or ecchymosis    Result Review    Result Review:  I have personally reviewed the results from the time of this admission to 1/8/2023 08:51 EST and agree with these findings:  [x]  Laboratory  [x]  Microbiology  [x]  Radiology  [x]  EKG/Telemetry   [x]  Cardiology/Vascular   [x]  Pathology  [x]  Old records  []  Other:    Assessment & Plan   Assessment / Plan       Active Hospital Problems:  Active Hospital Problems    Diagnosis    • **Atrial fibrillation with rapid ventricular response (HCC)    • Iron deficiency anemia    • Hypertension    • Atrial fibrillation, persistent     • Hypothyroidism    • History of CVA (cerebrovascular accident)      82 y.o. male with past medical history of hypothyroidism, chronic A. fib on anticoagulation, CVA with left-sided residual weakness, anxiety/depression, BPH who came in due to ongoing weakness and dizziness for the last few days noted to be with A. fib with RVR.  It resolved quickly with diltiazem stopped on 12/29     Plan:   Carvedilol 25 twice daily  Continue levothyroxine 50.  TSH was normal  Continue Xarelto 20 daily, tamsulosin 0.4 daily and finasteride 5 daily and escitalopram 20 daily  Will hold off cardiology consult at this time  PT recommended inpatient rehab.  [facility] has denied his precertification.  Social work attempting to find another rehab place after the weekend

## 2023-01-08 NOTE — PLAN OF CARE
Goal Outcome Evaluation:      Patient sitting on edge of bed resting quietly. Alert and oriented x4 but has moments on confusion. No s/s of acute distress noted at this time. No adverse events during shift.

## 2023-01-09 ENCOUNTER — READMISSION MANAGEMENT (OUTPATIENT)
Dept: CALL CENTER | Facility: HOSPITAL | Age: 83
End: 2023-01-09
Payer: MEDICARE

## 2023-01-09 VITALS
SYSTOLIC BLOOD PRESSURE: 109 MMHG | WEIGHT: 191.8 LBS | HEART RATE: 94 BPM | RESPIRATION RATE: 18 BRPM | BODY MASS INDEX: 28.41 KG/M2 | TEMPERATURE: 98.2 F | HEIGHT: 69 IN | DIASTOLIC BLOOD PRESSURE: 89 MMHG | OXYGEN SATURATION: 92 %

## 2023-01-09 PROBLEM — I48.91 ATRIAL FIBRILLATION WITH RAPID VENTRICULAR RESPONSE: Status: RESOLVED | Noted: 2022-12-29 | Resolved: 2023-01-09

## 2023-01-09 PROCEDURE — 92526 ORAL FUNCTION THERAPY: CPT

## 2023-01-09 RX ADMIN — CARVEDILOL 25 MG: 25 TABLET, FILM COATED ORAL at 11:37

## 2023-01-09 RX ADMIN — SENNOSIDES AND DOCUSATE SODIUM 2 TABLET: 8.6; 5 TABLET ORAL at 11:37

## 2023-01-09 RX ADMIN — FINASTERIDE 5 MG: 5 TABLET, FILM COATED ORAL at 11:36

## 2023-01-09 RX ADMIN — ESCITALOPRAM OXALATE 20 MG: 10 TABLET ORAL at 11:36

## 2023-01-09 RX ADMIN — LEVOTHYROXINE SODIUM 50 MCG: 25 TABLET ORAL at 06:36

## 2023-01-09 RX ADMIN — TAMSULOSIN HYDROCHLORIDE 0.4 MG: 0.4 CAPSULE ORAL at 18:01

## 2023-01-09 RX ADMIN — FAMOTIDINE 40 MG: 20 TABLET, FILM COATED ORAL at 11:37

## 2023-01-09 RX ADMIN — RIVAROXABAN 20 MG: 20 TABLET, FILM COATED ORAL at 18:01

## 2023-01-09 RX ADMIN — SENNOSIDES AND DOCUSATE SODIUM 2 TABLET: 8.6; 5 TABLET ORAL at 21:02

## 2023-01-09 RX ADMIN — CARVEDILOL 25 MG: 25 TABLET, FILM COATED ORAL at 21:02

## 2023-01-09 NOTE — SIGNIFICANT NOTE
01/09/23 1532   Plan   Plan Outpt PT referral has been faxed to [facility] at request of patient wife. Appt TBD.   Final Discharge Disposition Code 01 - home or self-care

## 2023-01-09 NOTE — THERAPY TREATMENT NOTE
"Acute Care - Speech Language Pathology   Swallow Treatment Note [facility] Link     Patient Name: Justyn Galo  : 1940  MRN: [id number]  Today's Date: 2023               Admit Date: 2022    Visit Dx:     ICD-10-CM ICD-9-CM   1. Atrial fibrillation with rapid ventricular response (HCC)  I48.91 427.31   2. Generalized weakness  R53.1 780.79   3. Oropharyngeal dysphagia  R13.12 787.22   4. Difficulty walking  R26.2 719.7     Patient Active Problem List   Diagnosis   • Atrial fibrillation, persistent    • Hypertension   • History of CVA (cerebrovascular accident)   • Hypothyroidism   • Gross hematuria   • Atrial fibrillation with rapid ventricular response (HCC)   • Iron deficiency anemia     Past Medical History:   Diagnosis Date   • Atrial fibrillation, persistent  10/14/2021   • BPH (benign prostatic hyperplasia)    • Disease of thyroid gland    • Essential hypertension 10/14/2021   • Falls frequently     SPOUSE REPORTS MULTIPLE FALLS \"EVERY WEEK. I'VE LOST COUNT.\"   • Generalized weakness    • History of CVA (cerebrovascular accident) 2012    RESIDUAL OF LEFT SIDE WEAKNESS   • Paroxysmal atrial fibrillation 10/14/2021   • Runny nose     CHRONIC RUNNY NOSE REPORTED PER SPOUSE.   • Urgency of urination     WEARS BRIEFS AT HOME     Past Surgical History:   Procedure Laterality Date   • LEG SURGERY Right        SPEECH PATHOLOGY DYSPHAGIA TREATMENT    Subjective/Behavioral Observations: Patient alert and cooperative.  Sitting at side of bed feeding self.      Day/time of Treatment: 2023      Current Diet: Mechanical soft, thin      Current Strategies: Alternate small bites and small sips of solids and liquids at a slow rate.      Treatment received: Dysphagia therapy to address swallow function through exercises and education of strategies.      Results of treatment: Patient feeding self bites of soft solid with no difficulty noted chewing and swallowing.  Sips of thin liquid by straw with no overt " clinical signs or symptoms of aspiration noted.  Vocal quality remained clear to cervical auscultation.  P.o. intake 50% of noon meal, 100 cc thin liquid.      Progress toward goals: Patient making good progress.      Barriers to Achieving goals: N/A      Plan of care:/changes in plan: Continue per current plan.  Diet of quintal soft solids and thin liquid.  Patient to feed self while sitting fully upright.                                                                                                     EDUCATION  The patient has been educated in the following areas:   Modified Diet Instruction.              Time Calculation:    Time Calculation- SLP     Row Name 01/09/23 1442             Time Calculation- SLP    SLP Stop Time 1245  -TB      SLP Received On 01/09/23  -TB         Untimed Charges    78111-PT Treatment Swallow Minutes 45  -TB         Total Minutes    Untimed Charges Total Minutes 45  -TB       Total Minutes 45  -TB            User Key  (r) = Recorded By, (t) = Taken By, (c) = Cosigned By    Initials Name Provider Type    TB Kiah Franco SLP Speech and Language Pathologist                Therapy Charges for Today     Code Description Service Date Service Provider Modifiers Qty    [id number]  ST TREATMENT SWALLOW 3 1/9/2023 Kiah Franco SLP GN 1               ELVIRA Castillo  1/9/2023

## 2023-01-09 NOTE — PLAN OF CARE
Problem: Dysrhythmia  Goal: Normalized Cardiac Rhythm  Outcome: Ongoing, Progressing  Intervention: Monitor and Manage Cardiac Rhythm Effect  Recent Flowsheet Documentation  Taken 1/8/2023 2000 by Alida Lainez RN  VTE Prevention/Management: (SEE MAR) other (see comments)     Problem: Fall Injury Risk  Goal: Absence of Fall and Fall-Related Injury  Outcome: Ongoing, Progressing  Intervention: Identify and Manage Contributors  Recent Flowsheet Documentation  Taken 1/8/2023 2000 by Alida Lainez RN  Medication Review/Management: medications reviewed  Intervention: Promote Injury-Free Environment  Recent Flowsheet Documentation  Taken 1/9/2023 0200 by Alida Lainez RN  Safety Promotion/Fall Prevention: safety round/check completed  Taken 1/9/2023 0100 by Alida Lainez RN  Safety Promotion/Fall Prevention: safety round/check completed  Taken 1/8/2023 2300 by Alida Lainez RN  Safety Promotion/Fall Prevention: safety round/check completed  Taken 1/8/2023 2200 by Alida Lainez RN  Safety Promotion/Fall Prevention: safety round/check completed  Taken 1/8/2023 2100 by Alida Lainez RN  Safety Promotion/Fall Prevention: safety round/check completed  Taken 1/8/2023 2000 by Alida Lainez RN  Safety Promotion/Fall Prevention: safety round/check completed     Problem: Skin Injury Risk Increased  Goal: Skin Health and Integrity  Outcome: Ongoing, Progressing  Intervention: Optimize Skin Protection  Recent Flowsheet Documentation  Taken 1/8/2023 2000 by Alida Lainez RN  Pressure Reduction Techniques:   weight shift assistance provided   frequent weight shift encouraged  Head of Bed (HOB) Positioning: HOB elevated  Pressure Reduction Devices: positioning supports utilized  Skin Protection: adhesive use limited     Problem: Adult Inpatient Plan of Care  Goal: Plan of Care Review  Outcome: Ongoing, Progressing  Goal: Patient-Specific Goal (Individualized)  Outcome: Ongoing, Progressing  Goal: Absence of Hospital-Acquired Illness  or Injury  Outcome: Ongoing, Progressing  Intervention: Identify and Manage Fall Risk  Recent Flowsheet Documentation  Taken 1/9/2023 0200 by Alida Lainez RN  Safety Promotion/Fall Prevention: safety round/check completed  Taken 1/9/2023 0100 by Alida Lainez RN  Safety Promotion/Fall Prevention: safety round/check completed  Taken 1/8/2023 2300 by Alida Lainez RN  Safety Promotion/Fall Prevention: safety round/check completed  Taken 1/8/2023 2200 by Alida Lainez RN  Safety Promotion/Fall Prevention: safety round/check completed  Taken 1/8/2023 2100 by Alida Lainez RN  Safety Promotion/Fall Prevention: safety round/check completed  Taken 1/8/2023 2000 by Alida Lainez RN  Safety Promotion/Fall Prevention: safety round/check completed  Intervention: Prevent Skin Injury  Recent Flowsheet Documentation  Taken 1/8/2023 2000 by Alida Lainez RN  Body Position: position changed independently  Skin Protection: adhesive use limited  Intervention: Prevent and Manage VTE (Venous Thromboembolism) Risk  Recent Flowsheet Documentation  Taken 1/8/2023 2000 by Alida Lainez RN  Activity Management:   activity adjusted per tolerance   up ad bambi   activity encouraged  VTE Prevention/Management: (SEE MAR) other (see comments)  Range of Motion: active ROM (range of motion) encouraged  Intervention: Prevent Infection  Recent Flowsheet Documentation  Taken 1/8/2023 2000 by Alida Lainez RN  Infection Prevention:   hand hygiene promoted   equipment surfaces disinfected   single patient room provided   visitors restricted/screened  Goal: Optimal Comfort and Wellbeing  Outcome: Ongoing, Progressing  Intervention: Monitor Pain and Promote Comfort  Recent Flowsheet Documentation  Taken 1/8/2023 2000 by Alida Lainez RN  Pain Management Interventions:   see MAR   quiet environment facilitated   pillow support provided  Intervention: Provide Person-Centered Care  Recent Flowsheet Documentation  Taken 1/8/2023 2000 by Alida Lainez  RN  Trust Relationship/Rapport:   care explained   choices provided   emotional support provided   empathic listening provided   questions answered   questions encouraged   reassurance provided   thoughts/feelings acknowledged  Goal: Readiness for Transition of Care  Outcome: Ongoing, Progressing   Goal Outcome Evaluation:      NO CHANGES IN STATUS AT THIS TIME

## 2023-01-09 NOTE — PROGRESS NOTES
[facility]     Progress Note    Patient Name: Justyn Galo  : 1940  MRN: [id number]  Primary Care Physician:  Martin Monahan MD  Date of admission: 2022    Subjective   Attempts are being made to try other rehab place  No major acute events overnight  Patient vitally stable and intermittently confused    Facility-Administered Medications as of 2023   Medication Dose Route Frequency Provider Last Rate Last Admin   • acetaminophen (TYLENOL) tablet 650 mg  650 mg Oral Q4H PRN Vazquez Fermin MD   650 mg at 23    Or   • acetaminophen (TYLENOL) 160 MG/5ML solution 650 mg  650 mg Oral Q4H PRN Vazquez Fermin MD        Or   • acetaminophen (TYLENOL) suppository 650 mg  650 mg Rectal Q4H PRN Vazquez Fermin MD       • [] ALPRAZolam (XANAX) tablet 0.5 mg  0.5 mg Oral Q8H PRN Vazquez Fermin MD   0.5 mg at 23   • aluminum-magnesium hydroxide-simethicone (MAALOX MAX) 400-400-40 MG/5ML suspension 15 mL  15 mL Oral Q6H PRN Vazquez Fermin MD       • sennosides-docusate (PERICOLACE) 8.6-50 MG per tablet 2 tablet  2 tablet Oral BID Vazquez Fermin MD   2 tablet at 23    And   • polyethylene glycol (MIRALAX) packet 17 g  17 g Oral Daily PRN Vazquez Fermin MD        And   • bisacodyl (DULCOLAX) EC tablet 5 mg  5 mg Oral Daily PRN Vazquez Fermin MD        And   • bisacodyl (DULCOLAX) suppository 10 mg  10 mg Rectal Daily PRN Vazquez Fermin MD       • carvedilol (COREG) tablet 25 mg  25 mg Oral BID Vazquez Fermin MD   25 mg at 23   • [COMPLETED] dilTIAZem (CARDIZEM) injection 10 mg  10 mg Intravenous Once Robert Velazquez DO   10 mg at 22 0224   • escitalopram (LEXAPRO) tablet 20 mg  20 mg Oral Daily Vazquez Fermin MD   20 mg at 23 0910   • famotidine (PEPCID) tablet 40 mg  40 mg Oral Daily Vazquez Fermin MD   40 mg at 23 0910   • [] ferric gluconate (FERRLECIT)  250 MG in sodium chloride 0.9% 250 mL IVPB  250 mg Intravenous Daily Jimmie Douglas MD       • finasteride (PROSCAR) tablet 5 mg  5 mg Oral Daily Vazquez Fermin MD   5 mg at 23 0910   • [] HYDROcodone-acetaminophen (NORCO)  MG per tablet 1 tablet  1 tablet Oral Q4H PRN Vazquez Fermin MD       • [] HYDROcodone-acetaminophen (NORCO) 5-325 MG per tablet 1 tablet  1 tablet Oral Q4H PRN Vazquez Fermin MD       • [] HYDROmorphone (DILAUDID) injection 0.5 mg  0.5 mg Intravenous Q2H PRN Vazquez Fermin MD        And   • naloxone (NARCAN) injection 0.4 mg  0.4 mg Intravenous Q5 Min PRN Vazquez Fermin MD       • [COMPLETED] lactated ringers bolus 500 mL  500 mL Intravenous Once Vazquez Fermin MD   Stopped at 22 1326   • levothyroxine (SYNTHROID, LEVOTHROID) tablet 50 mcg  50 mcg Oral Q AM Vazquez Fermin MD   50 mcg at 23 0636   • melatonin tablet 5 mg  5 mg Oral Nightly PRN Vazquez Fermin MD   5 mg at 23   • ondansetron (ZOFRAN) injection 4 mg  4 mg Intravenous Q6H PRN Vazquez Fermin MD       • rivaroxaban (XARELTO) tablet 20 mg  20 mg Oral Daily With Dinner Vazquez Fermin MD   20 mg at 23 1726   • sodium chloride 0.9 % flush 10 mL  10 mL Intravenous PRN Vazquez Fermin MD   10 mL at 22   • sodium chloride 0.9 % flush 10 mL  10 mL Intravenous PRN Vazquez Fermin MD   10 mL at 22 1530   • sodium chloride 0.9 % flush 10 mL  10 mL Intravenous Q12H Vazquez Fermin MD   10 mL at 23   • sodium chloride 0.9 % infusion 40 mL  40 mL Intravenous PRN Vazquez Fermin MD       • tamsulosin (FLOMAX) 24 hr capsule 0.4 mg  0.4 mg Oral Daily With Dinner Vazquez Ferimn MD   0.4 mg at 23 1726          Review of Systems  Constitutional:        Weakness tiredness fatigue  Eyes:                       No blurry vision, eye discharge, eye irritation, eye pain  HEENT:                    No acute hair loss, earache and discharge, nasal congestion or discharge, sore throat, postnasal drip  Respiratory:           No shortness of breath coughing sputum production wheezing hemoptysis pleuritic chest pain  Cardiovascular:     No chest pain, orthopnea, PND, dizziness, palpitation, lower extremity edema  Gastrointestinal:   No nausea vomiting diarrhea abdominal pain constipation  Genitourinary:       No urinary incontinence, hesitancy, frequency, urgency, dysuria  Hematologic:         No bruising, bleeding, pallor, lymphadenopathy  Endocrine:            No coldness, hot flashes, polyuria, abnormal hair growth  Musculoskeletal:    No body pains, aches, arthritic pains, muscle pain ,muscle wasting  Psychiatric:          No low or high mood, anxiety, hallucinations, delusions  Skin.                      No rash, ulcers, bruising, itching  Neurological:        No confusion, headache, focal weakness, numbness, dysphasia    Objective   Objective     Vitals:   Temp:  [97.2 °F (36.2 °C)-98.3 °F (36.8 °C)] 97.7 °F (36.5 °C)  Heart Rate:  [] 114  Resp:  [16-20] 20  BP: ()/(55-90) 126/88  Physical Exam    Constitutional: Awake, alert responsive, conversant, no obvious distress, lying down comfortably              Psychiatric:  Appropriate affect, cooperative   Neurologic:  Awake alert ,oriented x2, strength symmetric in all extremities, Cranial Nerves grossly intact to confrontation, speech clear   Eyes:   PERRLA, sclerae anicteric, no conjunctival injection   HEENT:  Moist mucous membranes, no nasal or eye discharge, no throat congestion   Neck:   Supple, no thyromegaly, no lymphadenopathy, trachea midline, no elevated JVD   Respiratory:  Clear to auscultation bilaterally, nonlabored respirations    Cardiovascular: RRR, no murmurs, rubs, or gallops, palpable pedal pulses bilaterally, No bilateral ankle edema   Gastrointestinal: Positive bowel sounds, soft, nontender, nondistended, no  organomegaly   Musculoskeletal:  No clubbing or cyanosis to extremities,muscle wasting, joint swelling, muscle weakness             Skin:                      No rashes, bruising, skin ulcers, petechiae or ecchymosis    Result Review    Result Review:  I have personally reviewed the results from the time of this admission to 1/9/2023 07:54 EST and agree with these findings:  [x]  Laboratory  [x]  Microbiology  [x]  Radiology  [x]  EKG/Telemetry   [x]  Cardiology/Vascular   [x]  Pathology  [x]  Old records  []  Other:    Assessment & Plan   Assessment / Plan       Active Hospital Problems:  Active Hospital Problems    Diagnosis    • **Atrial fibrillation with rapid ventricular response (HCC)    • Iron deficiency anemia    • Hypertension    • Atrial fibrillation, persistent     • Hypothyroidism    • History of CVA (cerebrovascular accident)      82 y.o. male with past medical history of hypothyroidism, chronic A. fib on anticoagulation, CVA with left-sided residual weakness, anxiety/depression, BPH who came in due to ongoing weakness and dizziness for the last few days noted to be with A. fib with RVR.  It resolved quickly with diltiazem stopped on 12/29     Plan:   Carvedilol 25 twice daily  Continue levothyroxine 50.  TSH was normal  Continue Xarelto 20 daily, tamsulosin 0.4 daily and finasteride 5 daily and escitalopram 20 daily  Will hold off cardiology consult at this time  PT recommended inpatient rehab.  [facility] has denied his precertification.  Social work attempting to find another rehab place today

## 2023-01-09 NOTE — SIGNIFICANT NOTE
01/09/23 1151   Plan   Plan Patient pre-cert denied for [facility] due to patient being able to ambulate 500 feet with contact guard assist only. Patient wife notified. She is agreeable to patient discharging home with outpt PT. She is not agreeable to home health at this time. She reports that they had it before and was not interested in these services again. Will schedule outpt PT once order is in.

## 2023-01-09 NOTE — PLAN OF CARE
Goal Outcome Evaluation:    AAO X4. TOLERATING ROOM AIR, DIET, & ACTIVITY.     DISCHARGE ORDERS IN PLACE; MSG LEFT ON SPOUSE'S CELLPHONE AT 4PM - WAITING FOR RETURN PHONE CALL.

## 2023-01-10 NOTE — DISCHARGE SUMMARY
[facility]   DISCHARGE SUMMARY    Patient Name: Justyn Galo  : 1940  MRN: [id number]    Date of Admission: 2022  Date of Discharge: 2023  Primary Care Physician: Martin Monahan MD    Consults     Date and Time Order Name Status Description    2022  3:12 AM General MD Inpatient Consult            Hospital Course     Presenting Problem:   Atrial fibrillation with rapid ventricular response (HCC) [I48.91]  Generalized weakness [R53.1]    Active and resolved problems  Principal Problem (Resolved):    Atrial fibrillation with rapid ventricular response (HCC)  Overview:  Active Problems:    Atrial fibrillation, persistent   Overview:    Hypertension  Overview:    History of CVA (cerebrovascular accident)  Overview:    Hypothyroidism  Overview:    Iron deficiency anemia  Overview:       Hospital Course:  Justyn Galo is a 82 y.o. male 82 y.o. male with past medical history of hypothyroidism, chronic A. fib on anticoagulation, CVA with left-sided residual weakness, anxiety/depression, BPH who came in due to ongoing weakness and dizziness for the last few days noted to be with A. fib with RVR.  It resolved quickly with diltiazem stopped on  and resumed home medications. Stay prolonged for 10 days due to placement issues and patient ended up being denied inpatient rehab and being discharged with outpatient PT      Vital Signs:  Temp:  [97.3 °F (36.3 °C)-98.2 °F (36.8 °C)] 98.2 °F (36.8 °C)  Heart Rate:  [] 94  Resp:  [18-20] 18  BP: (105-145)/(70-92) 109/89      Discharge Details        Discharge Medications      Changes to Medications      Instructions Start Date   tamsulosin 0.4 MG capsule 24 hr capsule  Commonly known as: FLOMAX  What changed: See the new instructions.   TAKE ONE CAPSULE BY MOUTH DAILY 30 MINUTES FOLLOWING THE SAME MEAL EACH DAY      Xarelto 20 MG tablet  Generic drug: rivaroxaban  What changed:   · how much to take  · when to take this   TAKE ONE  TABLET BY MOUTH DAILY         Continue These Medications      Instructions Start Date   acetaminophen 500 MG tablet  Commonly known as: TYLENOL   500 mg, Oral, Every 6 Hours PRN      carvedilol 25 MG tablet  Commonly known as: COREG   25 mg, Oral, 2 Times Daily      Cholecalciferol 125 MCG (5000 UT) tablet   5,000 Units, Oral, Daily      escitalopram 20 MG tablet  Commonly known as: LEXAPRO   20 mg, Oral, Daily      ezetimibe 10 MG tablet  Commonly known as: ZETIA   10 mg, Oral, Daily      ferrous sulfate 325 (65 FE) MG tablet   325 mg, Oral, Daily      finasteride 5 MG tablet  Commonly known as: PROSCAR   TAKE ONE TABLET BY MOUTH DAILY      levothyroxine 50 MCG tablet  Commonly known as: SYNTHROID, LEVOTHROID   50 mcg, Oral, Daily      multivitamin with minerals tablet tablet   1 tablet, Oral, Daily             Allergies   Allergen Reactions   • Loratadine Other (See Comments) and Unknown - Low Severity     PATIENT & SPOUSE SAID IF HE TAKES IT TO LONG (OR TO MANY DAYS IN A ROW) IT DRIES HIM OUT & MAKES HIS NOSE BLEED.         Discharge Disposition:  Home or Self Care    Diet:  Cardiac      Discharge Activity:   As tolerated      CODE STATUS:    Code Status and Medical Interventions:   Ordered at: 12/29/22 1685     Code Status (Patient has no pulse and is not breathing):    CPR (Attempt to Resuscitate)     Medical Interventions (Patient has pulse or is breathing):    Full Support         Future Appointments   Date Time Provider Department Center   1/11/2023  8:45 AM TRUMAN XR FL 1  [facility] XRAY [facility]   1/11/2023  9:30 AM TRUMAN XR FL 1  TRUMAN XRAY [facility]   1/11/2023 10:15 AM TRUMAN CT 2  TRUMAN CT [facility]   2/2/2023  3:00 PM Saqib Ortega MD [facility]   3/21/2023 10:00 AM Ilana Sanchez APRN [facility]       Additional Instructions for the Follow-ups that You Need to Schedule     Ambulatory Referral to Physical Therapy   As directed      Add Scheduling Instructions here    Follow-up needed: Yes               Time spent  on Discharge including face to face service:  10 minutes    Electronically signed by Vazquez Fermin MD, 01/09/23, 11:44 PM EST.

## 2023-01-10 NOTE — OUTREACH NOTE
Prep Survey    Flowsheet Row Responses   [facility] facility patient discharged from? [facility]   Is LACE score < 7 ? No   Eligibility Readm Mgmt   Discharge diagnosis Atrial fibrillation with rapid ventricular response, weakness and dizziness    Does the patient have one of the following disease processes/diagnoses(primary or secondary)? Other   Does the patient have Home health ordered? No   Is there a DME ordered? No   Comments regarding appointments Outpt PT at [facility]    Prep survey completed? Yes          LUCIANA STOKES - Registered Nurse

## 2023-01-11 ENCOUNTER — APPOINTMENT (OUTPATIENT)
Dept: CT IMAGING | Facility: HOSPITAL | Age: 83
End: 2023-01-11
Payer: MEDICARE

## 2023-01-11 ENCOUNTER — HOSPITAL ENCOUNTER (OUTPATIENT)
Dept: GENERAL RADIOLOGY | Facility: HOSPITAL | Age: 83
Discharge: HOME | End: 2023-01-11
Payer: MEDICARE

## 2023-01-11 DIAGNOSIS — I69.391 DYSPHAGIA DUE TO OLD STROKE: ICD-10-CM

## 2023-01-11 PROCEDURE — A9270 NON-COVERED ITEM OR SERVICE: HCPCS | Performed by: PHYSICIAN ASSISTANT

## 2023-01-11 PROCEDURE — 63710000001 BARIUM SULFATE 60 % SUSPENSION: Performed by: PHYSICIAN ASSISTANT

## 2023-01-11 PROCEDURE — 63710000001 BARIUM SULFATE 98 % RECONSTITUTED SUSPENSION: Performed by: PHYSICIAN ASSISTANT

## 2023-01-11 PROCEDURE — 63710000001 SOD BICARB-CITRIC ACID-SIMETHICONE 2.21-1.53-0.04 G PACK: Performed by: PHYSICIAN ASSISTANT

## 2023-01-11 PROCEDURE — 63710000001 BARIUM SULFATE 40 % SUSPENSION: Performed by: PHYSICIAN ASSISTANT

## 2023-01-11 PROCEDURE — 63710000001 BARIUM SULFATE 40 % RECONSTITUTED SUSPENSION: Performed by: PHYSICIAN ASSISTANT

## 2023-01-11 PROCEDURE — 74230 X-RAY XM SWLNG FUNCJ C+: CPT

## 2023-01-11 PROCEDURE — 63710000001 BARIUM SULFATE 700 MG TABLET: Performed by: PHYSICIAN ASSISTANT

## 2023-01-11 PROCEDURE — 92611 MOTION FLUOROSCOPY/SWALLOW: CPT

## 2023-01-11 PROCEDURE — 63710000001 BARIUM SULFATE 60 % CREAM: Performed by: PHYSICIAN ASSISTANT

## 2023-01-11 RX ADMIN — BARIUM SULFATE 1 TEASPOON(S): 0.6 CREAM ORAL at 09:38

## 2023-01-11 RX ADMIN — BARIUM SULFATE 135 ML: 980 POWDER, FOR SUSPENSION ORAL at 09:39

## 2023-01-11 RX ADMIN — ANTACID/ANTIFLATULENT 1 PACKET: 380; 550; 10; 10 GRANULE, EFFERVESCENT ORAL at 09:39

## 2023-01-11 RX ADMIN — BARIUM SULFATE 355 ML: 0.6 SUSPENSION ORAL at 09:39

## 2023-01-11 RX ADMIN — BARIUM SULFATE 50 ML: 400 SUSPENSION ORAL at 09:39

## 2023-01-11 RX ADMIN — BARIUM SULFATE 700 MG: 700 TABLET ORAL at 09:38

## 2023-01-11 RX ADMIN — BARIUM SULFATE 55 ML: 0.81 POWDER, FOR SUSPENSION ORAL at 09:38

## 2023-01-11 NOTE — MBS/VFSS/FEES
"Outpatient - Speech Language Pathology   Swallow Modified Barium Swallow Study  [facility]     Patient Name: Justyn Galo  : 1940  MRN: [id number]  Today's Date: 2023               Admit Date: 2023    Visit Dx:     ICD-10-CM ICD-9-CM   1. 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 I'VE LOST COUNT.\"   • Generalized weakness    • History of CVA (cerebrovascular accident) 2012    RESIDUAL OF LEFT SIDE WEAKNESS   • Paroxysmal atrial fibrillation 10/14/2021   • Runny nose     CHRONIC RUNNY NOSE REPORTED PER SPOUSE.   • Urgency of urination     WEARS BRIEFS AT HOME     Past Surgical History:   Procedure Laterality Date   • LEG SURGERY Right        MODIFIED BARIUM SWALLOW STUDY: SPEECH PATHOLOGY REPORT        DATE OF SERVICE: 2023    PERTINENT INFORMATION:  Mr. Galo is a 82year old male with diagnosis of dysphagia.  Spouse states patient often getting choked at home.  She states this may be on any food item though is often things like corn or chips.  She states he also gets strangled with drinking water.    He was referred for an MBSS by Dr. Loir Marquez to rule out aspiration as well as to determine appropriate treatment plan for this patient.      PROCEDURE:    Mr. Galo was alert and cooperative.  The patient was viewed in the lateral plane.  The following Ba consistencies were administered: Thin liquids, nectar thick liquids, barium mixed with applesauce, barium paste, barium mixed with cracker. The following compensatory " swallowing strategies were performed: Bolus modification, cyclic ingestion.      RESULTS:    1. Nectar liquid by spoon with spillage to the pharynx, swallow completed with trace laryngeal penetration.  2. Nectar liquid by cup with patient tilting head back, to the pharynx, swallow completed.  3. Thin liquid with swallow completed  4. Purée with swallow completed.  Minimal residue at tongue base and vallecula, second swallow nearly clears.  5. Pudding with chewing, swallow completed.  Residue at oral cavity and vallecula.  6. Solid results with extended chewing, patient requiring additional moisture to elicit swallow, spillage to the vallecula followed by swallow completed.  Residue noted at the vallecula.  Nearly clears with liquid wash.  7. Thin liquid via straw with holding the bolus, swallow completed.  Second trial of thin liquid by straw with holding the bolus briefly, spillage to the pharynx, swallow completed with trace laryngeal penetration      IMPRESSIONS:    Mr. Galo demonstrated oropharyngeal dysphagia characterized by increased tongue base strength, swallow delay, decreased oral motor coordination.  Trace laryngeal penetration noted at times with liquids.  No aspiration was observed during the study.     FUNCTIONAL DEFICIT: Patient scored level 5 of 7 on Functional Communication Measures for swallowing indicating a 20 to 39% limitation in function for current status, goal status, and discharge status.      RECOMMENDATIONS:   1.  Diet of mechanical soft solids with ground meats and gravies, thin liquid.  2.  Positioning fully upright for all p.o. intake and 30 minutes following.  3.  Alternate small bites and small sips of solids and liquids at a slow rate.  Double swallow each bite followed by small sip.  Single sips of liquid, may benefit from chin tuck.  4.  Recommend follow-up speech pathology services to address dysphagia.        Yes, patient/responsible party agrees with the plan of care and has  been informed of all alternatives, risks and benefits.    Thank you for this referral.                                                                                       EDUCATION  The patient has been educated in the following areas:   Modified Diet Instruction.              Time Calculation:    Time Calculation- SLP     Row Name 01/11/23 1109             Time Calculation- SLP    SLP Received On 01/11/23  -TB         Untimed Charges    SLP Eval/Re-eval  ST Motion Fluoro Eval Swallow - 48988  -TB      71210-OU Motion Fluoro Eval Swallow Minutes 90  -TB         Total Minutes    Untimed Charges Total Minutes 90  -TB       Total Minutes 90  -TB            User Key  (r) = Recorded By, (t) = Taken By, (c) = Cosigned By    Initials Name Provider Type    TB Kiah Franco SLP Speech and Language Pathologist                Therapy Charges for Today     Code Description Service Date Service Provider Modifiers Qty    [id number]  ST MOTION FLUORO EVAL SWALLOW 6 1/11/2023 Kiah Franco SLP GN 1               ELVIRA Castillo  1/11/2023

## 2023-01-12 ENCOUNTER — READMISSION MANAGEMENT (OUTPATIENT)
Dept: CALL CENTER | Facility: HOSPITAL | Age: 83
End: 2023-01-12
Payer: MEDICARE

## 2023-01-12 NOTE — OUTREACH NOTE
Medical Week 1 Survey    Flowsheet Row Responses   [facility] [place] patient discharged from? [facility]   Does the patient have one of the following disease processes/diagnoses(primary or secondary)? Other   Week 1 attempt successful? Yes   Call start time 1314   Call end time 1323   Discharge diagnosis Atrial fibrillation with rapid ventricular response, weakness and dizziness    Person spoke with today (if not patient) and relationship Spouse   Medication alerts for this patient XARLETO--bleeding and falls prevention advised   Meds reviewed with patient/caregiver? Yes   Does the patient have all medications ordered at discharge? N/A   Is the patient taking all medications as directed (includes completed medication regime)? Yes   Comments regarding appointments Outpt PT at [facility] -starts next week   Does the patient have a primary care provider?  Yes   Does the patient have an appointment with their PCP within 7 days of discharge? Greater than 7 days   Comments regarding PCP Appt not until February   Nursing Interventions --  [Advised to call regarding concerns r/t scab/injury on his head and actions to take--]   Has the patient kept scheduled appointments due by today? N/A   Has home health visited the patient within 72 hours of discharge? N/A   Did the patient receive a copy of their discharge instructions? Yes   Nursing interventions Reviewed instructions with patient   What is the patient's perception of their health status since discharge? Same  [Wife reports pt has ongoing fatigue--encouraged to monitor BP, pulse.  Pt uses walker at all times, falls/bleeding precautions advised. Pt to start P.T. next week,]   Is the patient/caregiver able to teach back the hierarchy of who to call/visit for symptoms/problems? PCP, Specialist, Home health nurse, Urgent Care, ED, 911 Yes   Additional teach back comments Wife reports that pt has an area on his head that has scabbed over but there is an area near an old skin  cancer site that has not healed--encouraged to call PCP for treatment plan   Week 1 call completed? Yes          LEA STOKES - Registered Nurse

## 2023-01-26 ENCOUNTER — READMISSION MANAGEMENT (OUTPATIENT)
Dept: CALL CENTER | Facility: HOSPITAL | Age: 83
End: 2023-01-26
Payer: MEDICARE

## 2023-01-26 NOTE — OUTREACH NOTE
Medical Week 3 Survey    Flowsheet Row Responses   [facility] patient discharged from? Link   Does the patient have one of the following disease processes/diagnoses(primary or secondary)? Other   Week 3 attempt successful? Yes   Call start time 1622   Call end time 1627   Person spoke with today (if not patient) and relationship Spouse.   Meds reviewed with patient/caregiver? Yes   Is the patient having any side effects they believe may be caused by any medication additions or changes? No   Does the patient have all medications ordered at discharge? N/A   Is the patient taking all medications as directed (includes completed medication regime)? Yes   Does the patient have a primary care provider?  Yes   Has the patient kept scheduled appointments due by today? Yes   Comments PCP appt next week.   Has home health visited the patient within 72 hours of discharge? N/A   DME comments Wife states resistant to using walker-has fallen once since discharge, and PCP aware. Going to outpatient PT.   Psychosocial issues? No   What is the patient's perception of their health status since discharge? Same   Is the patient/caregiver able to teach back signs and symptoms related to disease process for when to call PCP? Yes   Is the patient/caregiver able to teach back signs and symptoms related to disease process for when to call 911? Yes   Is the patient/caregiver able to teach back the hierarchy of who to call/visit for symptoms/problems? PCP, Specialist, Home health nurse, Urgent Care, ED, 911 Yes   Week 3 Call Completed? Yes   Is the patient interested in additional calls from an ambulatory ?  NOTE:  applies to high risk patients requiring additional follow-up. No   Wrap up additional comments Spouse states patient is about the same. States patient is more receptive to outpatient PT, and working on using walker. Denies any needs today. [facility] has been in contact by phone with patient's PCP.          HONEY Valdez  Registered Nurse

## 2023-02-02 ENCOUNTER — OFFICE VISIT (OUTPATIENT)
Dept: CARDIOLOGY | Facility: CLINIC | Age: 83
End: 2023-02-02
Payer: MEDICARE

## 2023-02-02 VITALS
SYSTOLIC BLOOD PRESSURE: 128 MMHG | HEIGHT: 69 IN | BODY MASS INDEX: 30.21 KG/M2 | HEART RATE: 119 BPM | DIASTOLIC BLOOD PRESSURE: 92 MMHG | WEIGHT: 204 LBS

## 2023-02-02 DIAGNOSIS — I10 PRIMARY HYPERTENSION: ICD-10-CM

## 2023-02-02 DIAGNOSIS — I48.19 ATRIAL FIBRILLATION, PERSISTENT: Primary | ICD-10-CM

## 2023-02-02 PROCEDURE — 99214 OFFICE O/P EST MOD 30 MIN: CPT | Performed by: INTERNAL MEDICINE

## 2023-02-02 RX ORDER — DILTIAZEM HYDROCHLORIDE 120 MG/1
120 CAPSULE, EXTENDED RELEASE ORAL DAILY
Qty: 90 CAPSULE | Refills: 3 | Status: SHIPPED | OUTPATIENT
Start: 2023-02-02

## 2023-02-02 NOTE — ASSESSMENT & PLAN NOTE
Recommend adding diltiazem 120 mg daily to his Coreg for improved rate control patient is on Xarelto daily for CVA prevention

## 2023-02-02 NOTE — PROGRESS NOTES
"Chief Complaint  Atrial Fibrillation, Follow-up, and Hypertension    Subjective    Patient denies any symptomatic complaints but was recently seen in the hospital for an episode of A. fib with RVR.  He reports stable shortness of breath issues blood pressures controlled.  Denies any chest discomfort    Past Medical History:   Diagnosis Date   • Atrial fibrillation, persistent  10/14/2021   • BPH (benign prostatic hyperplasia)    • Disease of thyroid gland    • Essential hypertension 10/14/2021   • Falls frequently     SPOUSE REPORTS MULTIPLE FALLS \"EVERY WEEK. I'VE LOST COUNT.\"   • Generalized weakness    • History of CVA (cerebrovascular accident) 2012    RESIDUAL OF LEFT SIDE WEAKNESS   • Paroxysmal atrial fibrillation 10/14/2021   • Runny nose     CHRONIC RUNNY NOSE REPORTED PER SPOUSE.   • Urgency of urination     WEARS BRIEFS AT HOME         Current Outpatient Medications:   •  acetaminophen (TYLENOL) 500 MG tablet, Take 500 mg by mouth Every 6 (Six) Hours As Needed for Mild Pain ., Disp: , Rfl:   •  carvedilol (COREG) 25 MG tablet, Take 1 tablet by mouth 2 (Two) Times a Day., Disp: 180 tablet, Rfl: 3  •  escitalopram (LEXAPRO) 20 MG tablet, Take 20 mg by mouth Daily., Disp: , Rfl:   •  ezetimibe (ZETIA) 10 MG tablet, Take 10 mg by mouth Daily., Disp: , Rfl:   •  ferrous sulfate 325 (65 FE) MG tablet, Take 325 mg by mouth Daily., Disp: , Rfl:   •  finasteride (PROSCAR) 5 MG tablet, TAKE ONE TABLET BY MOUTH DAILY (Patient taking differently: Take 5 mg by mouth Daily.), Disp: 90 tablet, Rfl: 4  •  levothyroxine (SYNTHROID, LEVOTHROID) 50 MCG tablet, Take 50 mcg by mouth Daily., Disp: , Rfl:   •  tamsulosin (FLOMAX) 0.4 MG capsule 24 hr capsule, TAKE ONE CAPSULE BY MOUTH DAILY 30 MINUTES FOLLOWING THE SAME MEAL EACH DAY (Patient taking differently: Take 0.4 mg by mouth After Dinner.), Disp: 90 capsule, Rfl: 4  •  Xarelto 20 MG tablet, TAKE ONE TABLET BY MOUTH DAILY (Patient taking differently: Take 20 mg by mouth " "After Dinner.), Disp: 90 tablet, Rfl: 1  •  Cholecalciferol 125 MCG (5000 UT) tablet, Take 5,000 Units by mouth Daily., Disp: , Rfl:   •  dilTIAZem XR (DILACOR XR) 120 MG 24 hr capsule, Take 1 capsule by mouth Daily., Disp: 90 capsule, Rfl: 3  •  multivitamin with minerals tablet tablet, Take 1 tablet by mouth Daily., Disp: , Rfl:     There are no discontinued medications.  Allergies   Allergen Reactions   • Loratadine Other (See Comments) and Unknown - Low Severity     PATIENT & SPOUSE SAID IF HE TAKES IT TO LONG (OR TO MANY DAYS IN A ROW) IT DRIES HIM OUT & MAKES HIS NOSE BLEED.        Social History     Tobacco Use   • Smoking status: Former     Types: Cigarettes     Quit date: 10/14/1961     Years since quittin.3   • Smokeless tobacco: Never   Vaping Use   • Vaping Use: Never used   Substance Use Topics   • Alcohol use: Never   • Drug use: Never       No family history on file.     Objective     /92   Pulse 119   Ht 175 cm (68.9\")   Wt 92.5 kg (204 lb)   BMI 30.21 kg/m²       Physical Exam    General Appearance:   · no acute distress  · Alert and oriented x3  HENT:   · lips not cyanotic  · Atraumatic  Neck:  · No jvd   · supple  Respiratory:  · no respiratory distress  · normal breath sounds  · no rales  Cardiovascular:  · Irregularly irregular  · no S3, no S4   · no murmur  · no rub  Extremities  · No cyanosis  lower extremity edema: Mild skin:   · warm, dry  · No rashes      Result Review :     No results found for: PROBNP  CMP    CMP 22    0054 0821     Glucose 127 (A) 140 (A) 112 (A) 103 (A)   BUN 20 20 20 16   Creatinine 1.02 0.94 1.11 0.93   eGFR 73.8 81.4 66.7 82.5   Sodium 137 135 (A) 134 (A) 138   Potassium 4.0 3.9 3.8 3.8   Chloride 101 101 103 102   Calcium 9.4 9.2 9.1 9.3   Total Protein 6.9      Albumin 4.0      Globulin 2.9      Total Bilirubin 0.9      Alkaline Phosphatase 111      AST (SGOT) 25      ALT (SGPT) 15      Albumin/Globulin Ratio 1.4    "   BUN/Creatinine Ratio 19.6 21.3 18.0 17.2   Anion Gap 11.0 8.7 5.9 8.6   (A) Abnormal value       Comments are available for some flowsheets but are not being displayed.           CBC w/diff    CBC w/Diff 12/29/22 12/29/22 12/30/22 12/31/22    0054 0821     WBC 5.48 4.92 4.67 5.05   RBC 3.59 (A) 3.40 (A) 3.33 (A) 3.51 (A)   Hemoglobin 12.0 (A) 11.6 (A) 11.3 (A) 11.9 (A)   Hematocrit 35.7 (A) 33.9 (A) 33.4 (A) 35.2 (A)   MCV 99.4 (A) 99.7 (A) 100.3 (A) 100.3 (A)   MCH 33.4 (A) 34.1 (A) 33.9 (A) 33.9 (A)   MCHC 33.6 34.2 33.8 33.8   RDW 13.3 13.4 13.4 13.2   Platelets 118 (A) 116 (A) 114 (A) 122 (A)   Neutrophil Rel % 84.9 (A) 79.1 (A) 56.2 53.2   Immature Granulocyte Rel % 0.4 0.2 0.4 0.2   Lymphocyte Rel % 8.8 (A) 11.8 (A) 25.9 29.3   Monocyte Rel % 5.5 8.1 13.7 (A) 12.5 (A)   Eosinophil Rel % 0.2 (A) 0.6 3.4 4.4   Basophil Rel % 0.2 0.2 0.4 0.4   (A) Abnormal value             Lab Results   Component Value Date    TSH 1.830 12/29/2022      No results found for: FREET4   No results found for: DDIMERQUANT  Magnesium   Date Value Ref Range Status   12/31/2022 1.9 1.6 - 2.4 mg/dL Final      No results found for: DIGOXIN   Lab Results   Component Value Date    TROPONINT <0.010 12/29/2022             No results found for: POCTROP    Results for orders placed during the hospital encounter of 11/10/21    Adult Transthoracic Echo Complete W/ Cont if Necessary Per Protocol    Interpretation Summary  Fibrocalcific mitral and aortic valves.  Normal left ventricular systolic function.  Biatrial enlargement.  Mild mitral regurgitation.  Moderate tricuspid regurgitation.                 Diagnoses and all orders for this visit:    1. Atrial fibrillation, persistent  (Primary)  Assessment & Plan:  Recommend adding diltiazem 120 mg daily to his Coreg for improved rate control patient is on Xarelto daily for CVA prevention      2. Primary hypertension  Assessment & Plan:  Blood pressure controlled range adding diltiazem 120 to help  with heart rate control continue with his dose of Coreg 25 twice daily      Other orders  -     dilTIAZem XR (DILACOR XR) 120 MG 24 hr capsule; Take 1 capsule by mouth Daily.  Dispense: 90 capsule; Refill: 3          Follow Up     Return in about 6 months (around 8/2/2023) for Follow with Peggy Ordonez, EKG with F/U.          Patient was given instructions and counseling regarding his condition or for health maintenance advice. Please see specific information pulled into the AVS if appropriate.

## 2023-02-02 NOTE — ASSESSMENT & PLAN NOTE
Blood pressure controlled range adding diltiazem 120 to help with heart rate control continue with his dose of Coreg 25 twice daily

## 2023-02-07 ENCOUNTER — READMISSION MANAGEMENT (OUTPATIENT)
Dept: CALL CENTER | Facility: HOSPITAL | Age: 83
End: 2023-02-07
Payer: MEDICARE

## 2023-02-07 NOTE — OUTREACH NOTE
Medical Week 4 Survey    Flowsheet Row Responses   [facility] facility patient discharged from? [facility]   Does the patient have one of the following disease processes/diagnoses(primary or secondary)? Other   Week 4 attempt successful? No          Sofie ARTHUR - Registered Nurse

## 2023-03-16 DIAGNOSIS — R31.0 GROSS HEMATURIA: ICD-10-CM

## 2023-03-16 DIAGNOSIS — N40.1 BENIGN PROSTATIC HYPERPLASIA WITH WEAK URINARY STREAM: Primary | ICD-10-CM

## 2023-03-16 DIAGNOSIS — R39.12 BENIGN PROSTATIC HYPERPLASIA WITH WEAK URINARY STREAM: Primary | ICD-10-CM

## 2023-03-21 ENCOUNTER — OFFICE VISIT (OUTPATIENT)
Dept: UROLOGY | Facility: CLINIC | Age: 83
End: 2023-03-21
Payer: MEDICARE

## 2023-03-21 VITALS — HEIGHT: 68 IN | BODY MASS INDEX: 30.92 KG/M2 | WEIGHT: 204 LBS

## 2023-03-21 DIAGNOSIS — R31.0 GROSS HEMATURIA: Primary | ICD-10-CM

## 2023-03-21 DIAGNOSIS — N40.1 BENIGN PROSTATIC HYPERPLASIA WITH WEAK URINARY STREAM: ICD-10-CM

## 2023-03-21 DIAGNOSIS — R39.12 BENIGN PROSTATIC HYPERPLASIA WITH WEAK URINARY STREAM: ICD-10-CM

## 2023-03-21 LAB
BACTERIA UR QL AUTO: NORMAL /HPF
BILIRUB BLD-MCNC: NEGATIVE MG/DL
CLARITY, POC: CLEAR
COLOR UR: YELLOW
EXPIRATION DATE: NORMAL
GLUCOSE UR STRIP-MCNC: NEGATIVE MG/DL
HYALINE CASTS UR QL AUTO: NORMAL /LPF
KETONES UR QL: NEGATIVE
LEUKOCYTE EST, POC: NEGATIVE
Lab: NORMAL
NITRITE UR-MCNC: NEGATIVE MG/ML
PH UR: 5.5 [PH] (ref 5–8)
PROT UR STRIP-MCNC: NEGATIVE MG/DL
RBC # UR STRIP: NEGATIVE /UL
RBC # UR STRIP: NORMAL /HPF
REF LAB TEST METHOD: NORMAL
SP GR UR: 1.02 (ref 1–1.03)
SQUAMOUS #/AREA URNS HPF: NORMAL /HPF
UROBILINOGEN UR QL: NORMAL
WBC # UR STRIP: NORMAL /HPF

## 2023-03-21 PROCEDURE — 99214 OFFICE O/P EST MOD 30 MIN: CPT | Performed by: NURSE PRACTITIONER

## 2023-03-21 PROCEDURE — 81003 URINALYSIS AUTO W/O SCOPE: CPT | Performed by: NURSE PRACTITIONER

## 2023-03-21 PROCEDURE — 1159F MED LIST DOCD IN RCRD: CPT | Performed by: NURSE PRACTITIONER

## 2023-03-21 PROCEDURE — 81015 MICROSCOPIC EXAM OF URINE: CPT | Performed by: NURSE PRACTITIONER

## 2023-03-21 PROCEDURE — 1160F RVW MEDS BY RX/DR IN RCRD: CPT | Performed by: NURSE PRACTITIONER

## 2023-03-21 RX ORDER — FINASTERIDE 5 MG/1
5 TABLET, FILM COATED ORAL DAILY
Qty: 90 TABLET | Refills: 4 | Status: SHIPPED | OUTPATIENT
Start: 2023-03-21

## 2023-03-21 RX ORDER — TAMSULOSIN HYDROCHLORIDE 0.4 MG/1
0.4 CAPSULE ORAL DAILY
Qty: 90 CAPSULE | Refills: 4 | Status: SHIPPED | OUTPATIENT
Start: 2023-03-21

## 2023-03-21 RX ORDER — MECLIZINE HYDROCHLORIDE 25 MG/1
TABLET ORAL
COMMUNITY
Start: 2023-03-20

## 2023-03-21 NOTE — PROGRESS NOTES
"Chief Complaint: gross hematuria    Subjective         History of Present Illness  Justyn Galo is a 82 y.o. male presents to [facility] to be seen for gross hematuria.    Since we last saw patient he has had a completed upper and lower urinary tract evaluation with a CT scan only revealing tiny nonobstructing stone in the upper left kidney.  Cystoscopy revealed no tumors lesions stones or other abnormalities.    He was recommended to continue tamsulosin and finasteride and follow-up in 1 year with a UA with micro.    Nocturia x 4-5 per wife but she reports he is sleeping 12-13 hours at a time.    He does have sleep apnea and is awaiting a cpap.     States hesitancy is better.     He states his stream is improved.     U/a WNL at this time.    Previous:    Patients wife states that she has seen blood on the front of his pants and has seen blood in his incontinence brief a few times.     His wife states that he has the urge to void often and cannot and he gets up a lot at night to pee.    He does have hesitancy, slow stream, intermittent and weak stream.     He is on tamsulosin 0.4 mg q day and finasteride.  Wife states he cannot tolerate 2 tamsulosin as it causes severe lethargy.    His urinalysis does reveal large amount of starch however when questioning patient and patient's spouse they do endorse that the patient uses powder to his groin area.    Urinalysis is unremarkable for microscopic hematuria.    He has no HX of renal stones.    Patient has recurrent falls as well .    No known family HX of  malignancies.        Objective     Past Medical History:   Diagnosis Date   • Atrial fibrillation, persistent  10/14/2021   • BPH (benign prostatic hyperplasia)    • Disease of thyroid gland    • Essential hypertension 10/14/2021   • Falls frequently     SPOUSE REPORTS MULTIPLE FALLS \"EVERY WEEK. I'VE LOST COUNT.\"   • Generalized weakness    • History of CVA (cerebrovascular accident) " 2012    RESIDUAL OF LEFT SIDE WEAKNESS   • Paroxysmal atrial fibrillation 10/14/2021   • Runny nose     CHRONIC RUNNY NOSE REPORTED PER SPOUSE.   • Urgency of urination     WEARS BRIEFS AT HOME       Past Surgical History:   Procedure Laterality Date   • LEG SURGERY Right          Current Outpatient Medications:   •  acetaminophen (TYLENOL) 500 MG tablet, Take 1 tablet by mouth Every 6 (Six) Hours As Needed for Mild Pain., Disp: , Rfl:   •  carvedilol (COREG) 25 MG tablet, Take 1 tablet by mouth 2 (Two) Times a Day., Disp: 180 tablet, Rfl: 3  •  Cholecalciferol 125 MCG (5000 UT) tablet, Take 1 tablet by mouth Daily., Disp: , Rfl:   •  dilTIAZem XR (DILACOR XR) 120 MG 24 hr capsule, Take 1 capsule by mouth Daily., Disp: 90 capsule, Rfl: 3  •  escitalopram (LEXAPRO) 20 MG tablet, Take 1 tablet by mouth Daily., Disp: , Rfl:   •  ezetimibe (ZETIA) 10 MG tablet, Take 1 tablet by mouth Daily., Disp: , Rfl:   •  ferrous sulfate 325 (65 FE) MG tablet, Take 1 tablet by mouth Daily., Disp: , Rfl:   •  finasteride (PROSCAR) 5 MG tablet, Take 1 tablet by mouth Daily., Disp: 90 tablet, Rfl: 4  •  levothyroxine (SYNTHROID, LEVOTHROID) 50 MCG tablet, Take 1 tablet by mouth Daily., Disp: , Rfl:   •  meclizine (ANTIVERT) 25 MG tablet, , Disp: , Rfl:   •  multivitamin with minerals tablet tablet, Take 1 tablet by mouth Daily., Disp: , Rfl:   •  tamsulosin (FLOMAX) 0.4 MG capsule 24 hr capsule, Take 1 capsule by mouth Daily., Disp: 90 capsule, Rfl: 4  •  Xarelto 20 MG tablet, TAKE ONE TABLET BY MOUTH DAILY (Patient taking differently: Take 1 tablet by mouth After Dinner.), Disp: 90 tablet, Rfl: 1    Allergies   Allergen Reactions   • Loratadine Other (See Comments) and Unknown - Low Severity     PATIENT & SPOUSE SAID IF HE TAKES IT TO LONG (OR TO MANY DAYS IN A ROW) IT DRIES HIM OUT & MAKES HIS NOSE BLEED.        History reviewed. No pertinent family history.    Social History     Socioeconomic History   • Marital status:   "  Tobacco Use   • Smoking status: Former     Types: Cigarettes     Quit date: 10/14/1961     Years since quittin.4   • Smokeless tobacco: Never   Vaping Use   • Vaping Use: Never used   Substance and Sexual Activity   • Alcohol use: Never   • Drug use: Never   • Sexual activity: Defer       Vital Signs:   Ht 172.7 cm (68\")   Wt 92.5 kg (204 lb)   BMI 31.02 kg/m²      Physical Exam     Result Review :   The following data was reviewed by: KAYLEY Foster on 2023:  Results for orders placed or performed in visit on 23   POC Urinalysis Dipstick, Automated    Specimen: Urine   Result Value Ref Range    Color Yellow Yellow, Straw, Dark Yellow, Shonna    Clarity, UA Clear Clear    Specific Gravity  1.025 1.005 - 1.030    pH, Urine 5.5 5.0 - 8.0    Leukocytes Negative Negative    Nitrite, UA Negative Negative    Protein, POC Negative Negative mg/dL    Glucose, UA Negative Negative mg/dL    Ketones, UA Negative Negative    Urobilinogen, UA Normal Normal, 0.2 E.U./dL    Bilirubin Negative Negative    Blood, UA Negative Negative    Lot Number [id number]     Expiration Date            Procedures        Assessment and Plan    Diagnoses and all orders for this visit:    1. Gross hematuria (Primary)  -     POC Urinalysis Dipstick, Automated  -     Urinalysis, Microscopic Only - Urine, Clean Catch; Future    2. Benign prostatic hyperplasia with weak urinary stream  -     finasteride (PROSCAR) 5 MG tablet; Take 1 tablet by mouth Daily.  Dispense: 90 tablet; Refill: 4  -     tamsulosin (FLOMAX) 0.4 MG capsule 24 hr capsule; Take 1 capsule by mouth Daily.  Dispense: 90 capsule; Refill: 4      We will continue him on finasteride and tamsulosin.     Did recommend they continue to pursue the insurance company for CPAP machine as we do know that there is a direct correlation in unmanaged or uncontrolled sleep apnea to increased nocturia.    Discussed with the patient and his wife at this point time we could " potentially try him on Gemtesa to see if this would help with nocturia however they are not interested as he is already experiencing polypharmacy and given that he does have some underlying issues that could be contributing to this.    We will follow-up with him in 1 year or sooner if needed.  They will call the office if he is with any new or worsening symptoms      I spent 10 minutes caring for Justyn on this date of service. This time includes time spent by me in the following activities:reviewing tests, obtaining and/or reviewing a separately obtained history, performing a medically appropriate examination and/or evaluation , counseling and educating the patient/family/caregiver, ordering medications, tests, or procedures, and documenting information in the medical record  Follow Up   Return in about 1 year (around 3/21/2024) for annual f/u bph .  Patient was given instructions and counseling regarding his condition or for health maintenance advice. Please see specific information pulled into the AVS if appropriate.         This document has been electronically signed by KAYLEY Foster  March 21, 2023 10:22 EDT

## 2023-03-22 ENCOUNTER — TELEPHONE (OUTPATIENT)
Dept: UROLOGY | Facility: CLINIC | Age: 83
End: 2023-03-22
Payer: MEDICARE

## 2023-03-22 NOTE — TELEPHONE ENCOUNTER
----- Message from KAYLEY Foster sent at 3/22/2023  8:35 AM EDT -----  Please inform patient and his wife that he is with no blood in his urine.

## 2023-04-10 ENCOUNTER — APPOINTMENT (OUTPATIENT)
Dept: CT IMAGING | Facility: HOSPITAL | Age: 83
End: 2023-04-10
Payer: MEDICARE

## 2023-04-10 ENCOUNTER — HOSPITAL ENCOUNTER (EMERGENCY)
Facility: HOSPITAL | Age: 83
Discharge: HOME | End: 2023-04-10
Attending: EMERGENCY MEDICINE | Admitting: EMERGENCY MEDICINE
Payer: MEDICARE

## 2023-04-10 ENCOUNTER — APPOINTMENT (OUTPATIENT)
Dept: GENERAL RADIOLOGY | Facility: HOSPITAL | Age: 83
End: 2023-04-10
Payer: MEDICARE

## 2023-04-10 VITALS
DIASTOLIC BLOOD PRESSURE: 80 MMHG | OXYGEN SATURATION: 94 % | HEART RATE: 66 BPM | WEIGHT: 207.23 LBS | TEMPERATURE: 98.5 F | RESPIRATION RATE: 16 BRPM | SYSTOLIC BLOOD PRESSURE: 128 MMHG | BODY MASS INDEX: 31.51 KG/M2

## 2023-04-10 DIAGNOSIS — S01.81XA CHIN LACERATION, INITIAL ENCOUNTER: ICD-10-CM

## 2023-04-10 DIAGNOSIS — S01.512A LACERATION OF ORAL CAVITY, INITIAL ENCOUNTER: ICD-10-CM

## 2023-04-10 DIAGNOSIS — S00.83XA CONTUSION OF FACE, INITIAL ENCOUNTER: ICD-10-CM

## 2023-04-10 DIAGNOSIS — S60.221A CONTUSION OF RIGHT PALM, INITIAL ENCOUNTER: ICD-10-CM
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
## 2023-04-10 PROCEDURE — 99283 EMERGENCY DEPT VISIT LOW MDM: CPT

## 2023-04-10 PROCEDURE — 72125 CT NECK SPINE W/O DYE: CPT

## 2023-04-10 PROCEDURE — 90471 IMMUNIZATION ADMIN: CPT | Performed by: REGISTERED NURSE

## 2023-04-10 PROCEDURE — 25010000002 TETANUS-DIPHTH-ACELL PERTUSSIS 5-2.5-18.5 LF-MCG/0.5 SUSPENSION PREFILLED SYRINGE: Performed by: REGISTERED NURSE

## 2023-04-10 PROCEDURE — 90715 TDAP VACCINE 7 YRS/> IM: CPT | Performed by: REGISTERED NURSE

## 2023-04-10 PROCEDURE — 73130 X-RAY EXAM OF HAND: CPT

## 2023-04-10 PROCEDURE — 70450 CT HEAD/BRAIN W/O DYE: CPT

## 2023-04-10 RX ORDER — CEPHALEXIN 500 MG/1
500 CAPSULE ORAL 4 TIMES DAILY
Qty: 28 CAPSULE | Refills: 0 | Status: SHIPPED | OUTPATIENT
Start: 2023-04-10 | End: 2023-04-17

## 2023-04-10 RX ORDER — CEPHALEXIN 250 MG/1
500 CAPSULE ORAL ONCE
Status: COMPLETED | OUTPATIENT
Start: 2023-04-10 | End: 2023-04-10

## 2023-04-10 RX ADMIN — CEPHALEXIN 500 MG: 250 CAPSULE ORAL at 23:04

## 2023-04-10 RX ADMIN — TETANUS TOXOID, REDUCED DIPHTHERIA TOXOID AND ACELLULAR PERTUSSIS VACCINE, ADSORBED 0.5 ML: 5; 2.5; 8; 8; 2.5 SUSPENSION INTRAMUSCULAR at 18:33

## 2023-04-10 NOTE — ED PROVIDER NOTES
"Time: 4:06 PM EDT  Date of encounter:  4/10/2023  Independent Historian/Clinical History and Information was obtained by:   Patient and Family  Chief Complaint   Patient presents with   • Fall   • Facial Laceration   • Mouth Injury       History is limited by: N/A    History of Present Illness:  Patient is a 82 y.o. year old male who presents to the emergency department for evaluation after a fall last night at 830.  The wife states that he shuffles his feet when he walks and they think that he tripped and landed on his face and outstretched hands.  He has a laceration and ecchymosis to his chin area and inside lower lip as well as to the right palm.  Patient denies LOC.      Facial Laceration  Mouth Injury  Associated symptoms: no chest pain, no ear pain, no headaches, no nausea, no shortness of breath and no vomiting        Patient Care Team  Primary Care Provider: Martin Monahan MD    Past Medical History:     Allergies   Allergen Reactions   • Loratadine Other (See Comments) and Unknown - Low Severity     PATIENT & SPOUSE SAID IF HE TAKES IT TO LONG (OR TO MANY DAYS IN A ROW) IT DRIES HIM OUT & MAKES HIS NOSE BLEED.     Past Medical History:   Diagnosis Date   • Atrial fibrillation, persistent  10/14/2021   • BPH (benign prostatic hyperplasia)    • Disease of thyroid gland    • Essential hypertension 10/14/2021   • Falls frequently     SPOUSE REPORTS MULTIPLE FALLS \"EVERY WEEK. I'VE LOST COUNT.\"   • Generalized weakness    • History of CVA (cerebrovascular accident) 2012    RESIDUAL OF LEFT SIDE WEAKNESS   • Paroxysmal atrial fibrillation 10/14/2021   • Runny nose     CHRONIC RUNNY NOSE REPORTED PER SPOUSE.   • Urgency of urination     WEARS BRIEFS AT HOME     Past Surgical History:   Procedure Laterality Date   • LEG SURGERY Right      History reviewed. No pertinent family history.    Home Medications:  Prior to Admission medications    Medication Sig Start Date End Date Taking? Authorizing Provider "   acetaminophen (TYLENOL) 500 MG tablet Take 1 tablet by mouth Every 6 (Six) Hours As Needed for Mild Pain.    Provider, Maribel, MD   carvedilol (COREG) 25 MG tablet Take 1 tablet by mouth 2 (Two) Times a Day. 22   Peggy Ordonez APRN   Cholecalciferol 125 MCG (5000 UT) tablet Take 1 tablet by mouth Daily.    Emergency, Nurse Tania RN   dilTIAZem XR (DILACOR XR) 120 MG 24 hr capsule Take 1 capsule by mouth Daily. 23   Saqib Ortega MD   escitalopram (LEXAPRO) 20 MG tablet Take 1 tablet by mouth Daily. 21   Emergency, Nurse Tania RN   ezetimibe (ZETIA) 10 MG tablet Take 1 tablet by mouth Daily. 21   Emergency, Nurse EVELYN Marin   ferrous sulfate 325 (65 FE) MG tablet Take 1 tablet by mouth Daily.    Provider, MD Maribel   finasteride (PROSCAR) 5 MG tablet Take 1 tablet by mouth Daily. 3/21/23   Ilana Sanchez APRN   levothyroxine (SYNTHROID, LEVOTHROID) 50 MCG tablet Take 1 tablet by mouth Daily. 21   Emergency, Nurse Epic, RN   meclizine (ANTIVERT) 25 MG tablet  3/20/23   Provider, MD Maribel   multivitamin with minerals tablet tablet Take 1 tablet by mouth Daily.    Emergency, Nurse EVELYN Marin   tamsulosin (FLOMAX) 0.4 MG capsule 24 hr capsule Take 1 capsule by mouth Daily. 3/21/23   Ilana Sanchez APRN   Xarelto 20 MG tablet TAKE ONE TABLET BY MOUTH DAILY  Patient taking differently: Take 1 tablet by mouth After Dinner. 10/28/22   Saqib Ortega MD        Social History:   Social History     Tobacco Use   • Smoking status: Former     Types: Cigarettes     Quit date: 10/14/1961     Years since quittin.5   • Smokeless tobacco: Never   Vaping Use   • Vaping Use: Never used   Substance Use Topics   • Alcohol use: Never   • Drug use: Never         Review of Systems:  Review of Systems   Constitutional: Negative.    HENT: Negative for ear pain and nosebleeds.    Eyes: Negative for photophobia and visual disturbance.   Respiratory: Negative for shortness of breath.     Cardiovascular: Negative for chest pain.   Gastrointestinal: Negative for nausea and vomiting.   Genitourinary: Negative for flank pain.   Musculoskeletal: Positive for arthralgias ( Right hand). Negative for back pain, joint swelling and neck pain.   Skin: Positive for color change ( Bruising to chin and right hand as well as lower lip) and wound (Chin laceration, right palm abrasion, oral laceration).   Neurological: Negative for headaches.   Hematological: Bruises/bleeds easily ( On blood thinner).   Psychiatric/Behavioral: Negative.         Physical Exam:  /80 (BP Location: Left arm, Patient Position: Lying)   Pulse 66   Temp 98.5 °F (36.9 °C)   Resp 16   Wt 94 kg (207 lb 3.7 oz)   SpO2 94%   BMI 31.51 kg/m²     Physical Exam  Vitals and nursing note reviewed.   Constitutional:       General: He is not in acute distress.  HENT:      Head: Normocephalic.      Right Ear: Tympanic membrane, ear canal and external ear normal.      Left Ear: Tympanic membrane, ear canal and external ear normal.      Nose: Nose normal.      Mouth/Throat:      Mouth: Mucous membranes are moist.      Comments: Inner lower lip bruising with superficial 0.5 cm laceration with no active bleed  Patient has dentures that are intact  Eyes:      Extraocular Movements: Extraocular movements intact.      Conjunctiva/sclera: Conjunctivae normal.      Pupils: Pupils are equal, round, and reactive to light.   Cardiovascular:      Rate and Rhythm: Normal rate and regular rhythm.      Heart sounds: Normal heart sounds.   Pulmonary:      Effort: Pulmonary effort is normal.      Breath sounds: Normal breath sounds.   Chest:      Chest wall: No tenderness.   Abdominal:      General: Bowel sounds are normal. There is no distension.      Palpations: Abdomen is soft.   Musculoskeletal:         General: Swelling ( Mild right palm) and tenderness ( Right palm) present. Normal range of motion.      Cervical back: Normal range of motion and neck  supple. No tenderness.   Skin:     General: Skin is warm.      Coloration: Skin is not cyanotic.      Findings: Bruising (Chin and right palm) present.      Comments: 2 cm linear laceration to submental area.  With approximated edges and no active hemorrhage.  Area is bruised    Small superficial abrasion to the proximal palm of right hand with no active bleeding   Neurological:      Mental Status: He is alert and oriented to person, place, and time.   Psychiatric:         Attention and Perception: Attention and perception normal.         Mood and Affect: Mood normal.         Behavior: Behavior normal.              Procedures:  Procedures      Medical Decision Making:      Comorbidities that affect care:    Patient's history of A-fib impacts his care today since he is on Xarelto for treatment and is at increased risk of bleeding, Atrial Fibrillation, Hypertension, Thyroid Disease    External Notes reviewed:    Previous Clinic Note: Previous clinic notes were reviewed by Dr. FRANCISCO Monahan mostly just for routine management of chronic comorbid conditions      The following orders were placed and all results were independently analyzed by me:  Orders Placed This Encounter   Procedures   • CT Head Without Contrast   • CT Cervical Spine Without Contrast   • XR Hand 3+ View Right   • Clean chin wound with betadine and SURGICEL DRESSING TO CHIN AND SECURE  [facility] [place]Specify)       Medications Given in the Emergency Department:  Medications   Tetanus-Diphth-Acell Pertussis (BOOSTRIX) injection 0.5 mL (0.5 mL Intramuscular Given 4/10/23 1833)   cephalexin (KEFLEX) capsule 500 mg (500 mg Oral Given 4/10/23 2304)        ED Course:    The patient was initially evaluated in the triage area where orders were placed. The patient was later dispositioned by KAYLEY John.      The patient was advised to stay for completion of workup which includes but is not limited to communication of labs and radiological results,  reassessment and plan. The patient was advised that leaving prior to disposition by a provider could result in critical findings that are not communicated to the patient.     ED Course as of 04/10/23 2314   Mon Apr 10, 2023   2250 I have advised the patient that the laceration cannot be closed since it is 26 hours old.  Wound is not gaping and edges are approximated.  When you clean it there is some bleeding on the dressing but there is no active hemorrhage from the wound currently. [DS]      ED Course User Index  [DS] Kim Keller APRN       Labs:    Lab Results (last 24 hours)     ** No results found for the last 24 hours. **           Imaging:    XR Hand 3+ View Right    Result Date: 4/10/2023  PROCEDURE: XR HAND 3+ VW RIGHT  COMPARISON: None  INDICATIONS: FELL YESTERDAY BRUISING TO THE PALM OF HIS RIGHT HAND  FINDINGS:  No fracture or malalignment is identified.  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  Osteoarthritic changes are noted involving predominantly the 2nd and 3rd MCP joints.        1. No acute fracture or malalignment. 2. CPPD      Noah Buck M.D.       Electronically Signed and Approved By: Noah Buck M.D. on 4/10/2023 at 17:41             CT Head Without Contrast    Result Date: 4/10/2023  PROCEDURE: CT HEAD WO CONTRAST  COMPARISON:  [facility], CT, CT HEAD WO CONTRAST, 9/30/2021, 14:21. INDICATIONS: Fall, head injury, taking blood thinner  PROTOCOL:   Standard imaging protocol performed    RADIATION:   DLP: 1432.2mGy*cm   MA and/or KV was adjusted to minimize radiation dose.     TECHNIQUE: After obtaining the patient's consent, CT images were obtained without non-ionic intravenous contrast material.  FINDINGS:  Chronic appearing lacunar infarcts are noted in the basal ganglia bilaterally.  A small chronic appearing infarct is noted along the posterior aspect of the right insular cortex.  A chronic appearing infarct is noted in the right  parietal lobe measuring approximately 2.7 cm.  Moderate low density in the cerebral white matter is consistent with small vessel ischemic disease.  No intracranial hemorrhage is seen.  Moderate diffuse cerebral and cerebellar atrophy is noted.  Moderate low density in the cerebral white matter is consistent with small vessel ischemic disease.  No focal calvarial abnormality is seen.         1. Multiple chronic appearing infarcts, as above 2. No specific CT evidence of acute infarction, mass or intracranial hemorrhage 3. Moderate small vessel ischemic changes in the white matter     Noah Buck M.D.       Electronically Signed and Approved By: Noah Buck M.D. on 4/10/2023 at 17:12             CT Cervical Spine Without Contrast    Result Date: 4/10/2023  PROCEDURE: CT CERVICAL SPINE WO CONTRAST  COMPARISON: None  INDICATIONS: Fall, head injury, neck pain  PROTOCOL:   Standard imaging protocol performed    RADIATION:   DLP: 1432.2mGy*cm   MA and/or KV was adjusted to minimize radiation dose.     TECHNIQUE: After obtaining the patient's consent, multi-planar CT images were created without contrast material.   FINDINGS:  There is levoconvex scoliosis near the level of the cervicothoracic junction.  There is normal height and alignment of the cervical vertebral bodies.  There is degenerative disc space narrowing at C3/4 and C6/7 levels.  There is no evidence of acute fracture.  The facet joints are intact.  Craniovertebral junction appears within normal limits.  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  Visualized intracranial contents appear within normal limits.  Mastoid air cells are clear.  Unenhanced soft tissues of the neck are within normal limits.  Visualized lung apices are clear.        1. No acute bony abnormality of the cervical spine.     LORI CORREIA MD       Electronically Signed and Approved By: LORI CORREIA MD on 4/10/2023 at 17:04                  Differential Diagnosis and Discussion:      Trauma:  Differential diagnosis considered but not limited to were subarachnoid hemorrhage, intracranial bleeding, pneumothorax, cardiac contusion, lung contusion, intra-abdominal bleeding, and compartment syndrome of any extremity or other significant traumatic pathology    All X-rays were independently reviewed by me.  CT scan radiology interpretation was reviewed by me.    MDM  Number of Diagnoses or Management Options  Chin laceration, initial encounter  Contusion of face, initial encounter  Contusion of right palm, initial encounter  Fall, initial encounter  Laceration of oral cavity, initial encounter  Diagnosis management comments: Patient presented to the emergency department after a fall last night when he tripped in the yard and hit his chin and caught himself with his hand.  They cleaned up the wounds and did not realize that there was an actual cut this morning and has kept bleeding.  Wife brought him in now for evaluation to see if it needs repaired.  Patient denies LOC.  Patient has no altered mental status.  Patient has no vomiting.  Patient has no severe headache.  Patient is on Xarelto for A-fib and wound is old so he cannot be repaired.  Patient will be put on prophylactic Keflex to prevent infection and Surgicel dressing will be placed to aid in stopping any residual bleeding.  Patient vital signs have been stable.  Imaging is within normal limits.  Patient is in no acute distress and is ready to be discharged.  Patient will follow-up with PCP as needed or return to the emergency department for new or worsening symptoms       Amount and/or Complexity of Data Reviewed  Tests in the radiology section of CPT®: reviewed and ordered  Tests in the medicine section of CPT®: ordered and reviewed  Obtain history from someone other than the patient: yes (Spouse)         Patient Care Considerations:    I considered wound repair but wounds are over 24  hours old      Consultants/Shared Management Plan:    None    Social Determinants of Health:    Patient has presented with family members who are responsible, reliable and will ensure follow up care.      Disposition and Care Coordination:    Discharged: The patient is suitable and stable for discharge with no need for consideration of observation or admission.    I have explained the patient´s condition, diagnoses and treatment plan based on the information available to me at this time. I have answered questions and addressed any concerns. The patient has a good  understanding of the patient´s diagnosis, condition, and treatment plan as can be expected at this point. The vital signs have been stable. The patient´s condition is stable and appropriate for discharge from the emergency department.      The patient will pursue further outpatient evaluation with the primary care physician or other designated or consulting physician as outlined in the discharge instructions. They are agreeable to this plan of care and follow-up instructions have been explained in detail. The patient has received these instructions in written format and have expressed an understanding of the discharge instructions. The patient is aware that any significant change in condition or worsening of symptoms should prompt an immediate return to this or the closest emergency department or call to 911.    Final diagnoses:   Fall, initial encounter   Contusion of face, initial encounter   Chin laceration, initial encounter   Contusion of right palm, initial encounter   Laceration of oral cavity, initial encounter        ED Disposition     ED Disposition   Discharge    Condition   Stable    Comment   --             This medical record created using voice recognition software.           Kim Keller, KAYLEY  04/10/23 3348

## 2023-04-10 NOTE — ED NOTES
Pt wife reports that the patient was walking outside in the dark and fell in the grass. Pt states he hit his chin on the ground. Pt takes Xarelto for A-fib.

## 2023-04-11 NOTE — DISCHARGE INSTRUCTIONS
Imaging was negative and showed no acute abnormality.    As we discussed your laceration to your chin is too old to be closed.  We will put a dressing on to keep it from bleeding due to your blood thinning medication.  You will be placed on prophylactic antibiotics.    For small-old oral laceration rinse your mouth with half-strength peroxide 3-4 times a day    Tylenol for pain    Keep dressing on chin for the next 24 hours.    Follow-up with PCP

## 2023-05-02 ENCOUNTER — OFFICE VISIT (OUTPATIENT)
Dept: ORTHOPEDIC SURGERY | Facility: CLINIC | Age: 83
End: 2023-05-02
Payer: MEDICARE

## 2023-05-02 VITALS — WEIGHT: 207 LBS | HEIGHT: 68 IN | BODY MASS INDEX: 31.37 KG/M2

## 2023-05-02 DIAGNOSIS — M25.521 RIGHT ELBOW PAIN: Primary | ICD-10-CM

## 2023-05-02 DIAGNOSIS — M70.21 OLECRANON BURSITIS OF RIGHT ELBOW: ICD-10-CM

## 2023-05-02 RX ORDER — TRIAMCINOLONE ACETONIDE 40 MG/ML
40 INJECTION, SUSPENSION INTRA-ARTICULAR; INTRAMUSCULAR
Status: COMPLETED | OUTPATIENT
Start: 2023-05-02 | End: 2023-05-02

## 2023-05-02 RX ORDER — LIDOCAINE HYDROCHLORIDE 10 MG/ML
1 INJECTION, SOLUTION EPIDURAL; INFILTRATION; INTRACAUDAL; PERINEURAL
Status: COMPLETED | OUTPATIENT
Start: 2023-05-02 | End: 2023-05-02

## 2023-05-02 RX ADMIN — LIDOCAINE HYDROCHLORIDE 1 ML: 10 INJECTION, SOLUTION EPIDURAL; INFILTRATION; INTRACAUDAL; PERINEURAL at 10:39

## 2023-05-02 RX ADMIN — TRIAMCINOLONE ACETONIDE 40 MG: 40 INJECTION, SUSPENSION INTRA-ARTICULAR; INTRAMUSCULAR at 10:39

## 2023-05-02 NOTE — PROGRESS NOTES
"Chief Complaint  Pain and Initial Evaluation of the Right Elbow     Subjective      Justyn Galo presents to [facility] for evaluation of the right elbow. The patient reports a fluid collection to the right elbow. He had a fall hitting his elbow. He has a history of a stroke. He denies any pain. He has no other complaints.     Allergies   Allergen Reactions   • Loratadine Other (See Comments) and Unknown - Low Severity     PATIENT & SPOUSE SAID IF HE TAKES IT TO LONG (OR TO MANY DAYS IN A ROW) IT DRIES HIM OUT & MAKES HIS NOSE BLEED.        Social History     Socioeconomic History   • Marital status:    Tobacco Use   • Smoking status: Former     Types: Cigarettes     Quit date: 10/14/1961     Years since quittin.5     Passive exposure: Never   • Smokeless tobacco: Never   Vaping Use   • Vaping Use: Never used   Substance and Sexual Activity   • Alcohol use: Never   • Drug use: Never   • Sexual activity: Defer        Review of Systems     Objective   Vital Signs:   Ht 172.7 cm (67.99\")   Wt 93.9 kg (207 lb)   BMI 31.48 kg/m²       Physical Exam  Constitutional:       Appearance: Normal appearance. The patient is well-developed and normal weight.   HENT:      Head: Normocephalic.      Right Ear: Hearing and external ear normal.      Left Ear: Hearing and external ear normal.      Nose: Nose normal.   Eyes:      Conjunctiva/sclera: Conjunctivae normal.   Cardiovascular:      Rate and Rhythm: Normal rate.   Pulmonary:      Effort: Pulmonary effort is normal.      Breath sounds: No wheezing or rales.   Abdominal:      Palpations: Abdomen is soft.      Tenderness: There is no abdominal tenderness.   Musculoskeletal:      Cervical back: Normal range of motion.   Skin:     Findings: No rash.   Neurological:      Mental Status: The patient is alert and oriented to person, place, and time.   Psychiatric:         Mood and Affect: Mood and affect normal.         Judgment: " Judgment normal.       Ortho Exam      Right elbow- fluid collection to the olecranon. No redness or tenderness. ROM -5 to 150 degrees. Stable to varus/valgus stress. Swelling to the olecranon bursa. Neurovascularly intact. Sensation to light touch median, radial, ulnar nerve. Positive AIN, PIN, ulnar nerve motor function. Positive pulses.     Medium Joint Arthrocentesis: R elbow  Date/Time: 5/2/2023 10:39 AM  Consent given by: patient  Site marked: site marked  Timeout: Immediately prior to procedure a time out was called to verify the correct patient, procedure, equipment, support staff and site/side marked as required   Supporting Documentation  Indications: pain   Procedure Details  Location: elbow - R elbow  Needle size: 18 G  Medications administered: 1 mL lidocaine PF 1% 1 %; 40 mg triamcinolone acetonide 40 MG/ML  Aspirate amount: 13 mL  Aspirate: bloody  Patient tolerance: patient tolerated the procedure well with no immediate complications          X-Ray Report:  Right elbow X-Ray  Indication: Evaluation of right elbow pain  AP/Lateral view(s)  Findings: no acute fracture. Soft tissue swelling to the olecranon bursa. Moderate degenerative changes to the elbow.   Prior studies available for comparison: no         Imaging Results (Most Recent)     Procedure Component Value Units Date/Time    XR Elbow 2 View Right [[id number]] Resulted: 05/02/23 1001     Updated: 05/02/23 1005           Result Review :       XR Hand 3+ View Right    Result Date: 4/10/2023  Narrative: PROCEDURE: XR HAND 3+ VW RIGHT  COMPARISON: None  INDICATIONS: FELL YESTERDAY BRUISING TO THE PALM OF HIS RIGHT HAND  FINDINGS:  No fracture or malalignment is identified.  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  Osteoarthritic changes are noted involving predominantly the 2nd and 3rd MCP joints.      Impression:   1. No acute fracture or malalignment. 2. CPPD      Noah Buck M.D.       Electronically  Signed and Approved By: Noah Buck M.D. on 4/10/2023 at 17:41             CT Head Without Contrast    Result Date: 4/10/2023  Narrative: PROCEDURE: CT HEAD WO CONTRAST  COMPARISON:  [facility], CT, CT HEAD WO CONTRAST, 9/30/2021, 14:21. INDICATIONS: Fall, head injury, taking blood thinner  PROTOCOL:   Standard imaging protocol performed    RADIATION:   DLP: 1432.2mGy*cm   MA and/or KV was adjusted to minimize radiation dose.     TECHNIQUE: After obtaining the patient's consent, CT images were obtained without non-ionic intravenous contrast material.  FINDINGS:  Chronic appearing lacunar infarcts are noted in the basal ganglia bilaterally.  A small chronic appearing infarct is noted along the posterior aspect of the right insular cortex.  A chronic appearing infarct is noted in the right parietal lobe measuring approximately 2.7 cm.  Moderate low density in the cerebral white matter is consistent with small vessel ischemic disease.  No intracranial hemorrhage is seen.  Moderate diffuse cerebral and cerebellar atrophy is noted.  Moderate low density in the cerebral white matter is consistent with small vessel ischemic disease.  No focal calvarial abnormality is seen.       Impression:   1. Multiple chronic appearing infarcts, as above 2. No specific CT evidence of acute infarction, mass or intracranial hemorrhage 3. Moderate small vessel ischemic changes in the white matter     Noah Buck M.D.       Electronically Signed and Approved By: Noah Buck M.D. on 4/10/2023 at 17:12             CT Cervical Spine Without Contrast    Result Date: 4/10/2023  Narrative: PROCEDURE: CT CERVICAL SPINE WO CONTRAST  COMPARISON: None  INDICATIONS: Fall, head injury, neck pain  PROTOCOL:   Standard imaging protocol performed    RADIATION:   DLP: 1432.2mGy*cm   MA and/or KV was adjusted to minimize radiation dose.     TECHNIQUE: After obtaining the patient's consent, multi-planar CT images were created without  contrast material.   FINDINGS:  There is levoconvex scoliosis near the level of the cervicothoracic junction.  There is normal height and alignment of the cervical vertebral bodies.  There is degenerative disc space narrowing at C3/4 and C6/7 levels.  There is no evidence of acute fracture.  The facet joints are intact.  Craniovertebral junction appears within normal limits.  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  Visualized intracranial contents appear within normal limits.  Mastoid air cells are clear.  Unenhanced soft tissues of the neck are within normal limits.  Visualized lung apices are clear.      Impression:   1. No acute bony abnormality of the cervical spine.     LORI CORREIA MD       Electronically Signed and Approved By: LORI CORREIA MD on 4/10/2023 at 17:04                      Assessment and Plan     Diagnoses and all orders for this visit:    1. Right elbow pain (Primary)  -     XR Elbow 2 View Right    2. Olecranon bursitis of right elbow        Discussed the treatment plan with the patient.  I reviewed the x-rays that were obtained today with the patient. Plan for conservative treatment at this time. Discussed the risks and benefits of a right elbow olecranon bursa aspiration and injection. The patient expressed understanding and wished to proceed. I aspirated 13 cc of bloody fluid from the elbow.     Call or return if worsening symptoms.    Follow Up     6 weeks      Patient was given instructions and counseling regarding his condition or for health maintenance advice. Please see specific information pulled into the AVS if appropriate.     Scribed for Saqib Ordonez MD by Kathleen Nascimento.  05/02/23   10:06 EDT    I have personally performed the services described in this document as scribed by the above individual and it is both accurate and complete. Saqib Ordonez MD 05/02/23

## 2023-05-10 RX ORDER — RIVAROXABAN 20 MG/1
TABLET, FILM COATED ORAL
Qty: 90 TABLET | Refills: 1 | Status: SHIPPED | OUTPATIENT
Start: 2023-05-10

## 2023-06-01 ENCOUNTER — HOSPITAL ENCOUNTER (EMERGENCY)
Facility: HOSPITAL | Age: 83
Discharge: HOME | End: 2023-06-01
Attending: EMERGENCY MEDICINE
Payer: MEDICARE

## 2023-06-01 VITALS
HEIGHT: 67 IN | RESPIRATION RATE: 18 BRPM | DIASTOLIC BLOOD PRESSURE: 81 MMHG | HEART RATE: 107 BPM | TEMPERATURE: 99 F | BODY MASS INDEX: 31.49 KG/M2 | WEIGHT: 200.62 LBS | OXYGEN SATURATION: 96 % | SYSTOLIC BLOOD PRESSURE: 135 MMHG

## 2023-06-01 DIAGNOSIS — K04.7 DENTAL INFECTION: ICD-10-CM

## 2023-06-01 DIAGNOSIS — K08.89 PAIN, DENTAL: Primary | ICD-10-CM

## 2023-06-01 LAB
ALBUMIN SERPL-MCNC: 3.8 G/DL (ref 3.5–5.2)
ALBUMIN/GLOB SERPL: 1.2 G/DL
ALP SERPL-CCNC: 130 U/L (ref 39–117)
ALT SERPL W P-5'-P-CCNC: 37 U/L (ref 1–41)
ANION GAP SERPL CALCULATED.3IONS-SCNC: 11.5 MMOL/L (ref 5–15)
AST SERPL-CCNC: 37 U/L (ref 1–40)
BASOPHILS # BLD AUTO: 0.02 10*3/MM3 (ref 0–0.2)
BASOPHILS NFR BLD AUTO: 0.3 % (ref 0–1.5)
BILIRUB SERPL-MCNC: 0.4 MG/DL (ref 0–1.2)
BUN SERPL-MCNC: 24 MG/DL (ref 8–23)
BUN/CREAT SERPL: 20.7 (ref 7–25)
CALCIUM SPEC-SCNC: 9.3 MG/DL (ref 8.6–10.5)
CHLORIDE SERPL-SCNC: 101 MMOL/L (ref 98–107)
CO2 SERPL-SCNC: 24.5 MMOL/L (ref 22–29)
CREAT SERPL-MCNC: 1.16 MG/DL (ref 0.76–1.27)
DEPRECATED RDW RBC AUTO: 50.7 FL (ref 37–54)
EGFRCR SERPLBLD CKD-EPI 2021: 62.9 ML/MIN/1.73
EOSINOPHIL # BLD AUTO: 0.13 10*3/MM3 (ref 0–0.4)
EOSINOPHIL NFR BLD AUTO: 1.7 % (ref 0.3–6.2)
ERYTHROCYTE [DISTWIDTH] IN BLOOD BY AUTOMATED COUNT: 13.5 % (ref 12.3–15.4)
GLOBULIN UR ELPH-MCNC: 3.1 GM/DL
GLUCOSE SERPL-MCNC: 101 MG/DL (ref 65–99)
HCT VFR BLD AUTO: 38.2 % (ref 37.5–51)
HGB BLD-MCNC: 13.1 G/DL (ref 13–17.7)
IMM GRANULOCYTES # BLD AUTO: 0.04 10*3/MM3 (ref 0–0.05)
IMM GRANULOCYTES NFR BLD AUTO: 0.5 % (ref 0–0.5)
LYMPHOCYTES # BLD AUTO: 1.6 10*3/MM3 (ref 0.7–3.1)
LYMPHOCYTES NFR BLD AUTO: 21.1 % (ref 19.6–45.3)
MCH RBC QN AUTO: 34.7 PG (ref 26.6–33)
MCHC RBC AUTO-ENTMCNC: 34.3 G/DL (ref 31.5–35.7)
MCV RBC AUTO: 101.3 FL (ref 79–97)
MONOCYTES # BLD AUTO: 0.66 10*3/MM3 (ref 0.1–0.9)
MONOCYTES NFR BLD AUTO: 8.7 % (ref 5–12)
NEUTROPHILS NFR BLD AUTO: 5.14 10*3/MM3 (ref 1.7–7)
NEUTROPHILS NFR BLD AUTO: 67.7 % (ref 42.7–76)
NRBC BLD AUTO-RTO: 0 /100 WBC (ref 0–0.2)
PLATELET # BLD AUTO: 154 10*3/MM3 (ref 140–450)
PMV BLD AUTO: 9.6 FL (ref 6–12)
POTASSIUM SERPL-SCNC: 4.5 MMOL/L (ref 3.5–5.2)
PROT SERPL-MCNC: 6.9 G/DL (ref 6–8.5)
RBC # BLD AUTO: 3.77 10*6/MM3 (ref 4.14–5.8)
SODIUM SERPL-SCNC: 137 MMOL/L (ref 136–145)
WBC NRBC COR # BLD: 7.59 10*3/MM3 (ref 3.4–10.8)

## 2023-06-01 PROCEDURE — 80053 COMPREHEN METABOLIC PANEL: CPT

## 2023-06-01 PROCEDURE — 99283 EMERGENCY DEPT VISIT LOW MDM: CPT

## 2023-06-01 PROCEDURE — 36415 COLL VENOUS BLD VENIPUNCTURE: CPT

## 2023-06-01 PROCEDURE — 85025 COMPLETE CBC W/AUTO DIFF WBC: CPT

## 2023-06-01 RX ORDER — CHLORHEXIDINE GLUCONATE 0.12 MG/ML
15 RINSE ORAL 4 TIMES DAILY
Qty: 473 ML | Refills: 0 | Status: SHIPPED | OUTPATIENT
Start: 2023-06-01

## 2023-06-01 RX ORDER — AMOXICILLIN AND CLAVULANATE POTASSIUM 875; 125 MG/1; MG/1
1 TABLET, FILM COATED ORAL 2 TIMES DAILY
Qty: 20 TABLET | Refills: 0 | Status: SHIPPED | OUTPATIENT
Start: 2023-06-01 | End: 2023-06-11

## 2023-06-01 RX ORDER — AMOXICILLIN AND CLAVULANATE POTASSIUM 875; 125 MG/1; MG/1
1 TABLET, FILM COATED ORAL ONCE
Status: COMPLETED | OUTPATIENT
Start: 2023-06-01 | End: 2023-06-01

## 2023-06-01 RX ORDER — LIDOCAINE HYDROCHLORIDE 20 MG/ML
5 SOLUTION OROPHARYNGEAL AS NEEDED
Qty: 100 ML | Refills: 0 | Status: SHIPPED | OUTPATIENT
Start: 2023-06-01

## 2023-06-01 RX ORDER — ACETAMINOPHEN AND CODEINE PHOSPHATE 300; 30 MG/1; MG/1
1 TABLET ORAL ONCE
Status: COMPLETED | OUTPATIENT
Start: 2023-06-01 | End: 2023-06-01

## 2023-06-01 RX ADMIN — ACETAMINOPHEN AND CODEINE PHOSPHATE 1 TABLET: 300; 30 TABLET ORAL at 20:24

## 2023-06-01 RX ADMIN — AMOXICILLIN AND CLAVULANATE POTASSIUM 1 TABLET: 875; 125 TABLET, FILM COATED ORAL at 20:24

## 2023-06-01 NOTE — ED PROVIDER NOTES
"Time: 5:28 PM EDT  Date of encounter:  6/1/2023  Independent Historian/Clinical History and Information was obtained by:   Patient  Chief Complaint: facial swellign    History is limited by: N/A    History of Present Illness:  Patient is a 83 y.o. year old male who presents to the emergency department for evaluation of right-sided lower jaw dental pain and swelling.  Went to his dentist today and states that they could not drain it if it needed to be done because they did not have the equipment there.  No antibiotics given.  Patient was just sent here for evaluation    HPI    Patient Care Team  Primary Care Provider: Martin Monahan MD    Past Medical History:     Allergies   Allergen Reactions   • Loratadine Other (See Comments) and Unknown - Low Severity     PATIENT & SPOUSE SAID IF HE TAKES IT TO LONG (OR TO MANY DAYS IN A ROW) IT DRIES HIM OUT & MAKES HIS NOSE BLEED.     Past Medical History:   Diagnosis Date   • Atrial fibrillation, persistent  10/14/2021   • BPH (benign prostatic hyperplasia)    • Disease of thyroid gland    • Essential hypertension 10/14/2021   • Falls frequently     SPOUSE REPORTS MULTIPLE FALLS \"EVERY WEEK. I'VE LOST COUNT.\"   • Generalized weakness    • History of CVA (cerebrovascular accident) 2012    RESIDUAL OF LEFT SIDE WEAKNESS   • Paroxysmal atrial fibrillation 10/14/2021   • Runny nose     CHRONIC RUNNY NOSE REPORTED PER SPOUSE.   • Urgency of urination     WEARS BRIEFS AT HOME     Past Surgical History:   Procedure Laterality Date   • LEG SURGERY Right      History reviewed. No pertinent family history.    Home Medications:  Prior to Admission medications    Medication Sig Start Date End Date Taking? Authorizing Provider   acetaminophen (TYLENOL) 500 MG tablet Take 1 tablet by mouth Every 6 (Six) Hours As Needed for Mild Pain.    Provider, MD Maribel   carvedilol (COREG) 25 MG tablet Take 1 tablet by mouth 2 (Two) Times a Day. 7/20/22   Peggy Ordonez APRN "   Cholecalciferol 125 MCG (5000 UT) tablet Take 1 tablet by mouth Daily.    Emergency, Nurse EVELYN Marin   dilTIAZem XR (DILACOR XR) 120 MG 24 hr capsule Take 1 capsule by mouth Daily. 23   Saqib Ortega MD   escitalopram (LEXAPRO) 20 MG tablet Take 1 tablet by mouth Daily. 21   Emergency, Nurse EVELYN Marin   ezetimibe (ZETIA) 10 MG tablet Take 1 tablet by mouth Daily. 21   Emergency, Nurse EVELYN Marin   ferrous sulfate 325 (65 FE) MG tablet Take 1 tablet by mouth Daily.    Provider, MD Maribel   finasteride (PROSCAR) 5 MG tablet Take 1 tablet by mouth Daily. 3/21/23   Ilana Sanchez APRN   levothyroxine (SYNTHROID, LEVOTHROID) 50 MCG tablet Take 1 tablet by mouth Daily. 21   Emergency, Nurse Epic, RN   meclizine (ANTIVERT) 25 MG tablet  3/20/23   Provider, MD Maribel   multivitamin with minerals tablet tablet Take 1 tablet by mouth Daily.    Emergency, Nurse EVELYN Marin   tamsulosin (FLOMAX) 0.4 MG capsule 24 hr capsule Take 1 capsule by mouth Daily. 3/21/23   Ilana Sanchez APRN   Xarelto 20 MG tablet TAKE ONE TABLET BY MOUTH DAILY 5/10/23   Saqib Ortega MD        Social History:   Social History     Tobacco Use   • Smoking status: Former     Types: Cigarettes     Quit date: 10/14/1961     Years since quittin.6     Passive exposure: Never   • Smokeless tobacco: Never   Vaping Use   • Vaping Use: Never used   Substance Use Topics   • Alcohol use: Never   • Drug use: Never         Review of Systems:  Review of Systems   Constitutional: Negative for chills and fever.   HENT: Positive for dental problem and facial swelling.    Gastrointestinal: Negative for nausea and vomiting.   Skin: Negative for rash.   Neurological: Negative for headaches.   Hematological: Negative for adenopathy.   Psychiatric/Behavioral: Negative.    All other systems reviewed and are negative.       Physical Exam:  /81 (BP Location: Right arm, Patient Position: Sitting)   Pulse 107   Temp 99 °F (37.2 °C)  "(Oral)   Resp 18   Ht 170.2 cm (67\")   Wt 91 kg (200 lb 9.9 oz)   SpO2 96%   BMI 31.42 kg/m²     Physical Exam  Vitals and nursing note reviewed.   Constitutional:       General: He is not in acute distress.  HENT:      Head: Normocephalic and atraumatic.      Right Ear: Tympanic membrane, ear canal and external ear normal.      Left Ear: Tympanic membrane, ear canal and external ear normal.      Nose: Nose normal.      Mouth/Throat:      Mouth: Mucous membranes are moist.      Pharynx: No posterior oropharyngeal erythema.      Comments: Patient has gingival tenderness and induration in right mandible from bicuspid back to molar.  Multiple teeth and dentition is missing.  There is no fluctuance or pointing abscess.  Mild mandibular swelling noted.    Eyes:      Extraocular Movements: Extraocular movements intact.      Conjunctiva/sclera: Conjunctivae normal.      Pupils: Pupils are equal, round, and reactive to light.   Cardiovascular:      Rate and Rhythm: Regular rhythm. Tachycardia present.      Heart sounds: Normal heart sounds.   Pulmonary:      Effort: Pulmonary effort is normal.      Breath sounds: Normal breath sounds.   Abdominal:      General: There is no distension.   Musculoskeletal:         General: Normal range of motion.   Lymphadenopathy:      Cervical: Cervical adenopathy present.   Skin:     General: Skin is warm and dry.      Coloration: Skin is not cyanotic.   Neurological:      Mental Status: He is alert and oriented to person, place, and time.   Psychiatric:         Attention and Perception: Attention and perception normal.         Mood and Affect: Mood normal.         Behavior: Behavior normal.          Procedures:  Procedures      Medical Decision Making:      Comorbidities that affect care:    Atrial Fibrillation, Hypertension, Thyroid Disease    External Notes reviewed:    None      The following orders were placed and all results were independently analyzed by me:  Orders Placed This " Encounter   Procedures   • Comprehensive Metabolic Panel   • CBC Auto Differential   • CBC & Differential       Medications Given in the Emergency Department:  Medications   amoxicillin-clavulanate (AUGMENTIN) 875-125 MG per tablet 1 tablet (1 tablet Oral Given 6/1/23 2024)   acetaminophen-codeine (TYLENOL with CODEINE #3) 300-30 MG per tablet 1 tablet (1 tablet Oral Given 6/1/23 2024)        ED Course:    ED Course as of 06/01/23 2227   Thu Jun 01, 2023 1729 --- PROVIDER IN TRIAGE NOTE ---    The patient was evaluated by Tarik helton in triage. Orders were placed and the patient is currently awaiting disposition.    [AJ]      ED Course User Index  [AJ] Tarik Morelos PA-C       Labs:    Lab Results (last 24 hours)     Procedure Component Value Units Date/Time    CBC & Differential [[id number]]  (Abnormal) Collected: 06/01/23 1737    Specimen: Blood Updated: 06/01/23 1825    Narrative:      The following orders were created for panel order CBC & Differential.  Procedure                               Abnormality         Status                     ---------                               -----------         ------                     CBC Auto Differential[[id number]]        Abnormal            Final result                 Please view results for these tests on the individual orders.    Comprehensive Metabolic Panel [[id number]]  (Abnormal) Collected: 06/01/23 1737    Specimen: Blood Updated: 06/01/23 1854     Glucose 101 mg/dL      BUN 24 mg/dL      Creatinine 1.16 mg/dL      Sodium 137 mmol/L      Potassium 4.5 mmol/L      Chloride 101 mmol/L      CO2 24.5 mmol/L      Calcium 9.3 mg/dL      Total Protein 6.9 g/dL      Albumin 3.8 g/dL      ALT (SGPT) 37 U/L      AST (SGOT) 37 U/L      Alkaline Phosphatase 130 U/L      Total Bilirubin 0.4 mg/dL      Globulin 3.1 gm/dL      A/G Ratio 1.2 g/dL      BUN/Creatinine Ratio 20.7     Anion Gap 11.5 mmol/L      eGFR 62.9 mL/min/1.73     Narrative:      GFR Normal  >60  Chronic Kidney Disease <60  Kidney Failure <15    The GFR formula is only valid for adults with stable renal function between ages 18 and 70.    CBC Auto Differential [[id number]]  (Abnormal) Collected: 06/01/23 1737    Specimen: Blood Updated: 06/01/23 1825     WBC 7.59 10*3/mm3      RBC 3.77 10*6/mm3      Hemoglobin 13.1 g/dL      Hematocrit 38.2 %      .3 fL      MCH 34.7 pg      MCHC 34.3 g/dL      RDW 13.5 %      RDW-SD 50.7 fl      MPV 9.6 fL      Platelets 154 10*3/mm3      Neutrophil % 67.7 %      Lymphocyte % 21.1 %      Monocyte % 8.7 %      Eosinophil % 1.7 %      Basophil % 0.3 %      Immature Grans % 0.5 %      Neutrophils, Absolute 5.14 10*3/mm3      Lymphocytes, Absolute 1.60 10*3/mm3      Monocytes, Absolute 0.66 10*3/mm3      Eosinophils, Absolute 0.13 10*3/mm3      Basophils, Absolute 0.02 10*3/mm3      Immature Grans, Absolute 0.04 10*3/mm3      nRBC 0.0 /100 WBC            Imaging:    No Radiology Exams Resulted Within Past 24 Hours      Differential Diagnosis and Discussion:    Dental Pain: Differential diagnosis includes but is not limited to dental caries, periodontitis, pericoronitis, peridental abscess, gingival abscess, apthous stomatitis, allergic stomatitis, acute necrotizing ulcerative gingivitis, herpetic stomatitis.      MDM  Number of Diagnoses or Management Options  Dental infection  Pain, dental  Diagnosis management comments: I have explained the patient´s condition, diagnoses and treatment plan based on the information available to me at this time. I have answered questions and addressed any concerns. The patient has a good  understanding of the patient´s diagnosis, condition, and treatment plan as can be expected at this point. The vital signs have been stable. The patient´s condition is stable and appropriate for discharge from the emergency department.      The patient will pursue further outpatient evaluation with the primary care physician or other designated or  consulting physician as outlined in the discharge instructions. They are agreeable to this plan of care and follow-up instructions have been explained in detail. The patient has received these instructions in written format and have expressed an understanding of the discharge instructions. The patient is aware that any significant change in condition or worsening of symptoms should prompt an immediate return to this or the closest emergency department or call to 911.         Amount and/or Complexity of Data Reviewed  Tests in the medicine section of CPT®: ordered and reviewed  Obtain history from someone other than the patient: yes (Spouse)    Risk of Complications, Morbidity, and/or Mortality  Presenting problems: low  Management options: low    Patient Progress  Patient progress: stable       Patient Care Considerations:    NARCOTICS: I considered prescribing opiate pain medication as an outpatient, however Due to patient's age would like to try nonnarcotic medications for management      Consultants/Shared Management Plan:    None    Social Determinants of Health:    Patient has presented with family members who are responsible, reliable and will ensure follow up care.      Disposition and Care Coordination:    Discharged: The patient is suitable and stable for discharge with no need for consideration of observation or admission.    I have explained the patient´s condition, diagnoses and treatment plan based on the information available to me at this time. I have answered questions and addressed any concerns. The patient has a good  understanding of the patient´s diagnosis, condition, and treatment plan as can be expected at this point. The vital signs have been stable. The patient´s condition is stable and appropriate for discharge from the emergency department.      The patient will pursue further outpatient evaluation with the primary care physician or other designated or consulting physician as outlined in  the discharge instructions. They are agreeable to this plan of care and follow-up instructions have been explained in detail. The patient has received these instructions in written format and have expressed an understanding of the discharge instructions. The patient is aware that any significant change in condition or worsening of symptoms should prompt an immediate return to this or the closest emergency department or call to 911.  I have explained discharge medications and the need for follow up with the patient/caretakers. This was also printed in the discharge instructions. Patient was discharged with the following medications and follow up:      Medication List      New Prescriptions    amoxicillin-clavulanate 875-125 MG per tablet  Commonly known as: AUGMENTIN  Take 1 tablet by mouth 2 (Two) Times a Day for 10 days.     chlorhexidine 0.12 % solution  Commonly known as: PERIDEX  Apply 15 mL to the mouth or throat 4 (Four) Times a Day.     Lidocaine Viscous HCl 2 % solution  Commonly known as: XYLOCAINE  Take 5 mL by mouth As Needed for Mild Pain. Saturate gauze or cotton ball and apply to affected areas of pain as needed           Where to Get Your Medications      These medications were sent to [facility] [id number] - [place], [place] - [address] AT [facility] [address] - [phone]  - [phone]   [address], [place] [address]    Phone: [phone]   · amoxicillin-clavulanate 875-125 MG per tablet  · chlorhexidine 0.12 % solution  · Lidocaine Viscous HCl 2 % solution      your dentist             Final diagnoses:   Pain, dental   Dental infection        ED Disposition     ED Disposition   Discharge    Condition   Stable    Comment   --             This medical record created using voice recognition software.           Kim Keller, KAYLEY  06/01/23 1288

## 2023-06-02 NOTE — DISCHARGE INSTRUCTIONS
Take antibiotic as prescribed.  Oral rinses as prescribed.  Until you  your prescription and use half-strength peroxide    Warm compresses to jaw 4 times a day.    Tylenol or Motrin as needed for pain.    Follow-up with your dentist or oral surgeon for further treatment

## 2023-06-13 ENCOUNTER — OFFICE VISIT (OUTPATIENT)
Dept: ORTHOPEDIC SURGERY | Facility: CLINIC | Age: 83
End: 2023-06-13
Payer: MEDICARE

## 2023-06-13 VITALS
DIASTOLIC BLOOD PRESSURE: 98 MMHG | WEIGHT: 200 LBS | HEIGHT: 67 IN | OXYGEN SATURATION: 95 % | HEART RATE: 118 BPM | BODY MASS INDEX: 31.39 KG/M2 | SYSTOLIC BLOOD PRESSURE: 146 MMHG

## 2023-06-13 DIAGNOSIS — M25.521 RIGHT ELBOW PAIN: Primary | ICD-10-CM

## 2023-06-13 DIAGNOSIS — M70.21 OLECRANON BURSITIS OF RIGHT ELBOW: ICD-10-CM

## 2023-06-13 NOTE — PROGRESS NOTES
"Chief Complaint  Follow-up of the Right Elbow    Subjective          History of Present Illness      Justyn Galo is a 83 y.o. male  presents to [facility] for     Patient presents for follow-up evaluation of right elbow olecranon bursitis.  He had aspiration of bloody fluid at last visit on 2023 with Dr. Ordonez.  Patient states his swelling has remained resolved,.  His wife also states this.  He states he has full range of motion and no pain, no new complaints, patient wife agrees      Allergies   Allergen Reactions    Loratadine Other (See Comments) and Unknown - Low Severity     PATIENT & SPOUSE SAID IF HE TAKES IT TO LONG (OR TO MANY DAYS IN A ROW) IT DRIES HIM OUT & MAKES HIS NOSE BLEED.        Social History     Socioeconomic History    Marital status:    Tobacco Use    Smoking status: Former     Types: Cigarettes     Quit date: 10/14/1961     Years since quittin.7     Passive exposure: Never    Smokeless tobacco: Never   Vaping Use    Vaping Use: Never used   Substance and Sexual Activity    Alcohol use: Never    Drug use: Never    Sexual activity: Defer        REVIEW OF SYSTEMS    Constitutional: Denies fevers, chills, weight loss  Cardiovascular: Denies chest pain, shortness of breath  Skin: Denies rashes, acute skin changes  Neurologic: Denies headache, loss of consciousness  MSK: Right elbow pain      Objective   Vital Signs:   /98   Pulse 118   Ht 170.2 cm (67\")   Wt 90.7 kg (200 lb)   SpO2 95%   BMI 31.32 kg/m²     Body mass index is 31.32 kg/m².    Physical Exam    Right elbow: Nontender posterior anterior lateral and medial elbow, no palpable or visible fluid collection in the posterior elbow, full extension full flexion, full supination and pronation, 5 out of 5 strength    Procedures    Imaging Results (Most Recent)       None             Result Review :   The following data was reviewed by: ISRAEL Carson on " 06/13/2023:               Assessment and Plan    Diagnoses and all orders for this visit:    1. Right elbow pain (Primary)    2. Olecranon bursitis of right elbow        Discussed diagnosis and treatment options with the patient and his wife they were advised if any new or concerning symptoms occur regarding the elbow to call us right away otherwise follow-up as needed, they agreed    Call or return if worsening symptoms.    Follow Up   Return if symptoms worsen or fail to improve.  Patient was given instructions and counseling regarding his condition or for health maintenance advice. Please see specific information pulled into the AVS if appropriate.

## 2023-06-19 ENCOUNTER — TELEPHONE (OUTPATIENT)
Dept: CARDIOLOGY | Facility: CLINIC | Age: 83
End: 2023-06-19
Payer: MEDICARE

## 2023-06-19 NOTE — TELEPHONE ENCOUNTER
The [facility] received a fax that requires your attention. The document has been indexed to the patient’s chart for your review.      Reason for sending: EXTERNAL MEDICAL RECORD NOTIFICATION    Documents Description: MEDICAL CLEARANCE FORM TO BE SIGNED    Name of Sender: [facility]    Date Indexed: 6/19/23    Notes (if needed): SEE FAX IN CHART UNDER CARE COORDINATION

## 2023-07-07 PROBLEM — N17.9 ACUTE KIDNEY FAILURE: Status: ACTIVE | Noted: 2023-07-07

## 2023-07-07 PROBLEM — R19.7 DIARRHEA WITH DEHYDRATION: Status: ACTIVE | Noted: 2023-07-07

## 2023-07-24 RX ORDER — CARVEDILOL 25 MG/1
TABLET ORAL
Qty: 180 TABLET | Refills: 3 | OUTPATIENT
Start: 2023-07-24

## 2023-07-26 ENCOUNTER — READMISSION MANAGEMENT (OUTPATIENT)
Dept: CALL CENTER | Facility: HOSPITAL | Age: 83
End: 2023-07-26
Payer: MEDICARE

## 2023-07-26 NOTE — OUTREACH NOTE
Medical Week 3 Survey      Flowsheet Row Responses   [facility] patient discharged from? [facility]   Does the patient have one of the following disease processes/diagnoses(primary or secondary)? Other   Week 3 attempt successful? Yes   Call start time 1113   Call end time 1115   Discharge diagnosis Diarrhea with dehydration   Person spoke with today (if not patient) and relationship Soledad spouse   Meds reviewed with patient/caregiver? Yes   Does the patient have a primary care provider?  Yes   Does the patient have an appointment with their PCP within 7 days of discharge? Yes   Has the patient kept scheduled appointments due by today? Yes   What is the Home health agency?  [facility]   Has home health visited the patient within 72 hours of discharge? Yes   Psychosocial issues? No   What is the patient's perception of their health status since discharge? New symptoms unrelated to diagnosis   Week 3 Call Completed? Yes   Graduated Yes   Call end time 1115            Muna RAMOS - Registered Nurse

## 2023-07-31 ENCOUNTER — TELEPHONE (OUTPATIENT)
Dept: CARDIOLOGY | Facility: CLINIC | Age: 83
End: 2023-07-31
Payer: MEDICARE

## 2023-08-01 PROBLEM — R29.6 RECURRENT FALLS: Status: ACTIVE | Noted: 2023-08-01

## 2023-08-01 PROBLEM — M25.521 RIGHT ELBOW PAIN: Status: RESOLVED | Noted: 2023-06-13 | Resolved: 2023-08-01

## 2023-08-02 ENCOUNTER — OFFICE VISIT (OUTPATIENT)
Dept: CARDIOLOGY | Facility: CLINIC | Age: 83
End: 2023-08-02
Payer: MEDICARE

## 2023-08-02 VITALS
DIASTOLIC BLOOD PRESSURE: 75 MMHG | HEART RATE: 95 BPM | BODY MASS INDEX: 24.9 KG/M2 | SYSTOLIC BLOOD PRESSURE: 115 MMHG | WEIGHT: 194 LBS | HEIGHT: 74 IN

## 2023-08-02 DIAGNOSIS — I10 PRIMARY HYPERTENSION: ICD-10-CM

## 2023-08-02 DIAGNOSIS — I48.19 ATRIAL FIBRILLATION, PERSISTENT: Primary | ICD-10-CM

## 2023-08-02 DIAGNOSIS — I50.32 CHRONIC HEART FAILURE WITH PRESERVED EJECTION FRACTION (HFPEF): ICD-10-CM

## 2023-08-02 RX ORDER — LORATADINE 10 MG/1
CAPSULE, LIQUID FILLED ORAL
COMMUNITY

## 2023-08-02 RX ORDER — DILTIAZEM HYDROCHLORIDE 120 MG/1
120 CAPSULE, EXTENDED RELEASE ORAL DAILY
Qty: 90 CAPSULE | Refills: 3 | Status: SHIPPED | OUTPATIENT
Start: 2023-08-02

## 2023-08-15 ENCOUNTER — LAB REQUISITION (OUTPATIENT)
Dept: LAB | Facility: HOSPITAL | Age: 83
End: 2023-08-15
Payer: MEDICARE

## 2023-08-15 DIAGNOSIS — I11.0 HYPERTENSIVE HEART DISEASE WITH HEART FAILURE: ICD-10-CM

## 2023-08-15 DIAGNOSIS — E86.0 DEHYDRATION: ICD-10-CM

## 2023-08-15 DIAGNOSIS — N17.9 ACUTE KIDNEY FAILURE, UNSPECIFIED: ICD-10-CM

## 2023-08-15 LAB
ANION GAP SERPL CALCULATED.3IONS-SCNC: 11.6 MMOL/L (ref 5–15)
BUN SERPL-MCNC: 29 MG/DL (ref 8–23)
BUN/CREAT SERPL: 24 (ref 7–25)
CALCIUM SPEC-SCNC: 9.6 MG/DL (ref 8.6–10.5)
CHLORIDE SERPL-SCNC: 102 MMOL/L (ref 98–107)
CO2 SERPL-SCNC: 25.4 MMOL/L (ref 22–29)
CREAT SERPL-MCNC: 1.21 MG/DL (ref 0.76–1.27)
EGFRCR SERPLBLD CKD-EPI 2021: 59.8 ML/MIN/1.73
GLUCOSE SERPL-MCNC: 98 MG/DL (ref 65–99)
NT-PROBNP SERPL-MCNC: 1536 PG/ML (ref 0–1800)
POTASSIUM SERPL-SCNC: 3.9 MMOL/L (ref 3.5–5.2)
SODIUM SERPL-SCNC: 139 MMOL/L (ref 136–145)

## 2023-08-15 PROCEDURE — 80048 BASIC METABOLIC PNL TOTAL CA: CPT | Performed by: INTERNAL MEDICINE

## 2023-08-15 PROCEDURE — 83880 ASSAY OF NATRIURETIC PEPTIDE: CPT | Performed by: INTERNAL MEDICINE

## 2023-08-16 ENCOUNTER — TELEPHONE (OUTPATIENT)
Dept: CARDIOLOGY | Facility: CLINIC | Age: 83
End: 2023-08-16
Payer: MEDICARE

## 2023-08-16 DIAGNOSIS — I50.32 CHRONIC HEART FAILURE WITH PRESERVED EJECTION FRACTION (HFPEF): Primary | ICD-10-CM

## 2023-08-16 RX ORDER — FUROSEMIDE 20 MG/1
20 TABLET ORAL DAILY
Qty: 90 TABLET | Refills: 3 | Status: SHIPPED | OUTPATIENT
Start: 2023-08-16

## 2023-08-16 RX ORDER — POTASSIUM CHLORIDE 750 MG/1
10 TABLET, FILM COATED, EXTENDED RELEASE ORAL DAILY
Qty: 90 TABLET | Refills: 3 | Status: SHIPPED | OUTPATIENT
Start: 2023-08-16

## 2023-08-16 NOTE — TELEPHONE ENCOUNTER
BNP is in normal range and renal function is stable  Increase lasix to 20 mg daily and add potassium chloride 10 meq daily. Check BMP and BNP in 1 week  Reduce fluid and sodium intake, compression socks or ACE bandage wrap for swelling

## 2023-08-16 NOTE — TELEPHONE ENCOUNTER
Received call from Lanette RIVAS with [facility]. Patient having BLE edema. Patient has went from 198 to 206 in 1 week. Patient takes Lasix 20mg every other day.       RN heidy labs yesterday evening on patient.

## 2023-08-16 NOTE — TELEPHONE ENCOUNTER
----- Message from KAYLEY Brown sent at 8/16/2023 10:39 AM EDT -----  Labs are normal.  Follow-up as scheduled.

## 2023-08-16 NOTE — TELEPHONE ENCOUNTER
Spoke with pt wife. Gave results and plan. No concerns.      Transferred to [facility] regarding edema in legs.

## 2023-08-23 ENCOUNTER — TELEPHONE (OUTPATIENT)
Dept: CARDIOLOGY | Facility: CLINIC | Age: 83
End: 2023-08-23
Payer: MEDICARE

## 2023-08-23 ENCOUNTER — LAB REQUISITION (OUTPATIENT)
Dept: LAB | Facility: HOSPITAL | Age: 83
End: 2023-08-23
Payer: MEDICARE

## 2023-08-23 DIAGNOSIS — E86.0 DEHYDRATION: ICD-10-CM

## 2023-08-23 DIAGNOSIS — N17.9 ACUTE KIDNEY FAILURE, UNSPECIFIED: ICD-10-CM

## 2023-08-23 DIAGNOSIS — I11.0 HYPERTENSIVE HEART DISEASE WITH HEART FAILURE: ICD-10-CM

## 2023-08-23 LAB
ANION GAP SERPL CALCULATED.3IONS-SCNC: 10.9 MMOL/L (ref 5–15)
BUN SERPL-MCNC: 26 MG/DL (ref 8–23)
BUN/CREAT SERPL: 22 (ref 7–25)
CALCIUM SPEC-SCNC: 9.9 MG/DL (ref 8.6–10.5)
CHLORIDE SERPL-SCNC: 105 MMOL/L (ref 98–107)
CO2 SERPL-SCNC: 25.1 MMOL/L (ref 22–29)
CREAT SERPL-MCNC: 1.18 MG/DL (ref 0.76–1.27)
EGFRCR SERPLBLD CKD-EPI 2021: 61.6 ML/MIN/1.73
GLUCOSE SERPL-MCNC: 99 MG/DL (ref 65–99)
NT-PROBNP SERPL-MCNC: 1128 PG/ML (ref 0–1800)
POTASSIUM SERPL-SCNC: 4.3 MMOL/L (ref 3.5–5.2)
SODIUM SERPL-SCNC: 141 MMOL/L (ref 136–145)

## 2023-08-23 PROCEDURE — 83880 ASSAY OF NATRIURETIC PEPTIDE: CPT | Performed by: INTERNAL MEDICINE

## 2023-08-23 PROCEDURE — 80048 BASIC METABOLIC PNL TOTAL CA: CPT | Performed by: INTERNAL MEDICINE

## 2023-08-23 NOTE — TELEPHONE ENCOUNTER
----- Message from KAYLEY Peralta sent at 8/23/2023 12:56 PM EDT -----  Labs look good, continue current medications

## 2023-08-24 ENCOUNTER — HOSPITAL ENCOUNTER (OUTPATIENT)
Dept: GENERAL RADIOLOGY | Facility: HOSPITAL | Age: 83
Discharge: HOME | End: 2023-08-24
Admitting: INTERNAL MEDICINE
Payer: MEDICARE

## 2023-08-24 ENCOUNTER — OFFICE VISIT (OUTPATIENT)
Dept: PULMONOLOGY | Facility: CLINIC | Age: 83
End: 2023-08-24
Payer: MEDICARE

## 2023-08-24 VITALS
HEIGHT: 74 IN | HEART RATE: 67 BPM | DIASTOLIC BLOOD PRESSURE: 89 MMHG | BODY MASS INDEX: 26.9 KG/M2 | TEMPERATURE: 98.2 F | OXYGEN SATURATION: 100 % | RESPIRATION RATE: 18 BRPM | SYSTOLIC BLOOD PRESSURE: 134 MMHG | WEIGHT: 209.6 LBS

## 2023-08-24 DIAGNOSIS — G47.33 OBSTRUCTIVE SLEEP APNEA: ICD-10-CM

## 2023-08-24 DIAGNOSIS — R06.09 DYSPNEA ON EXERTION: ICD-10-CM

## 2023-08-24 DIAGNOSIS — I50.32 CHRONIC DIASTOLIC CHF (CONGESTIVE HEART FAILURE): ICD-10-CM

## 2023-08-24 DIAGNOSIS — R06.09 DYSPNEA ON EXERTION: Primary | ICD-10-CM

## 2023-08-24 DIAGNOSIS — I48.19 ATRIAL FIBRILLATION, PERSISTENT: ICD-10-CM

## 2023-08-24 PROCEDURE — 1159F MED LIST DOCD IN RCRD: CPT | Performed by: INTERNAL MEDICINE

## 2023-08-24 PROCEDURE — 3079F DIAST BP 80-89 MM HG: CPT | Performed by: INTERNAL MEDICINE

## 2023-08-24 PROCEDURE — 71046 X-RAY EXAM CHEST 2 VIEWS: CPT

## 2023-08-24 PROCEDURE — 3075F SYST BP GE 130 - 139MM HG: CPT | Performed by: INTERNAL MEDICINE

## 2023-08-24 PROCEDURE — 1160F RVW MEDS BY RX/DR IN RCRD: CPT | Performed by: INTERNAL MEDICINE

## 2023-08-24 PROCEDURE — 99203 OFFICE O/P NEW LOW 30 MIN: CPT | Performed by: INTERNAL MEDICINE

## 2023-08-24 RX ORDER — MECLIZINE HYDROCHLORIDE 25 MG/1
25 TABLET ORAL 3 TIMES DAILY PRN
COMMUNITY

## 2023-08-24 NOTE — PROGRESS NOTES
Pulmonary Consultation    Martin Monahan MD,    Thank you for asking me to see Justyn Galo for   Chief Complaint   Patient presents with    Establish Care    Shortness of Breath    Sleep Apnea    Cough   .      History of Present Illness  Justyn Galo is a 83 y.o. male with a PMH significant for atrial fibrillation and chronic CHF presents with dyspnea on exertion along with peripheral cyanosis in the morning time and cough with orthopnea patient has been having swelling of his extremities he denies any chest pain fever or hemoptysis patient denies any wheezing but does have snoring especially when he is on his back      Tobacco use history:  Former smoker      Review of Systems: History obtained from chart review and the patient.  Review of Systems   Respiratory:  Positive for shortness of breath.    Cardiovascular:  Positive for leg swelling.   All other systems reviewed and are negative.  As described in the HPI. Otherwise, remainder of ROS (14 systems) were negative.    Patient Active Problem List   Diagnosis    Atrial fibrillation, persistent     Hypertension    History of CVA (cerebrovascular accident)    Hypothyroidism    Gross hematuria    Iron deficiency anemia    Olecranon bursitis of right elbow    Diarrhea with dehydration    Acute kidney failure    Recurrent falls    Chronic heart failure with preserved ejection fraction (HFpEF)         Current Outpatient Medications:     acetaminophen (TYLENOL) 500 MG tablet, Take 1 tablet by mouth Every 6 (Six) Hours As Needed for Mild Pain., Disp: , Rfl:     carvedilol (COREG) 25 MG tablet, Take 0.5 tablets by mouth 2 (Two) Times a Day., Disp: 180 tablet, Rfl: 3    Cholecalciferol 125 MCG (5000 UT) tablet, Take 1 tablet by mouth Daily., Disp: , Rfl:     dilTIAZem XR (DILACOR XR) 120 MG 24 hr capsule, Take 1 capsule by mouth Daily., Disp: 90 capsule, Rfl: 3    escitalopram (LEXAPRO) 20 MG tablet, Take 1 tablet by mouth Daily., Disp: , Rfl:      "ezetimibe (ZETIA) 10 MG tablet, Take 1 tablet by mouth Daily., Disp: , Rfl:     finasteride (PROSCAR) 5 MG tablet, Take 1 tablet by mouth Daily., Disp: 90 tablet, Rfl: 4    furosemide (LASIX) 20 MG tablet, Take 1 tablet by mouth Daily., Disp: 90 tablet, Rfl: 3    levothyroxine (SYNTHROID, LEVOTHROID) 50 MCG tablet, Take 1 tablet by mouth Daily., Disp: , Rfl:     Loratadine 10 MG capsule, Take  by mouth., Disp: , Rfl:     meclizine (ANTIVERT) 25 MG tablet, Take 1 tablet by mouth 3 (Three) Times a Day As Needed for Dizziness., Disp: , Rfl:     multivitamin with minerals tablet tablet, Take 1 tablet by mouth Daily., Disp: , Rfl:     potassium chloride 10 MEQ CR tablet, Take 1 tablet by mouth Daily., Disp: 90 tablet, Rfl: 3    rivaroxaban (Xarelto) 20 MG tablet, Take 1 tablet by mouth Daily With Dinner., Disp: 90 tablet, Rfl: 3    tamsulosin (FLOMAX) 0.4 MG capsule 24 hr capsule, Take 1 capsule by mouth Daily., Disp: 90 capsule, Rfl: 4    chlorhexidine (PERIDEX) 0.12 % solution, Apply 15 mL to the mouth or throat 4 (Four) Times a Day. (Patient not taking: Reported on 8/24/2023), Disp: 473 mL, Rfl: 0    Allergies   Allergen Reactions    Loratadine Other (See Comments) and Unknown - Low Severity     PATIENT & SPOUSE SAID IF HE TAKES IT TO LONG (OR TO MANY DAYS IN A ROW) IT DRIES HIM OUT & MAKES HIS NOSE BLEED.       Past Medical History:   Diagnosis Date    Atrial fibrillation, persistent  10/14/2021    BPH (benign prostatic hyperplasia)     Disease of thyroid gland     Essential hypertension 10/14/2021    Falls frequently     SPOUSE REPORTS MULTIPLE FALLS \"EVERY WEEK. I'VE LOST COUNT.\"    Generalized weakness     History of CVA (cerebrovascular accident) 2012    RESIDUAL OF LEFT SIDE WEAKNESS    Urgency of urination     WEARS BRIEFS AT HOME     Past Surgical History:   Procedure Laterality Date    LEG SURGERY Right      Social History     Socioeconomic History    Marital status:    Tobacco Use    Smoking status: " "Former     Packs/day: 1.00     Types: Cigarettes     Start date:      Quit date: 10/14/1961     Years since quittin.9     Passive exposure: Never    Smokeless tobacco: Never   Vaping Use    Vaping Use: Never used   Substance and Sexual Activity    Alcohol use: Never    Drug use: Never    Sexual activity: Defer     History reviewed. No pertinent family history.    XR Chest 1 View    Result Date: 2023    1. Low volume inspiration with bibasilar atelectasis 2. Stable cardiomegaly       LORI CORREIA MD       Electronically Signed and Approved By: LORI CORREIA MD on 2023 at 16:59             XR Chest 1 View    Result Date: 2023    1. Mild bibasilar airspace opacity favored to represent fibrosis or atelectasis.  A small infiltrate is not definitively excluded.       Noah Buck M.D.       Electronically Signed and Approved By: Noah Buck M.D. on 2023 at 20:11                  Objective     Blood pressure 134/89, pulse 67, temperature 98.2 øF (36.8 øC), temperature source Tympanic, resp. rate 18, height 188 cm (74\"), weight 95.1 kg (209 lb 9.6 oz), SpO2 100 %.  Physical Exam  Vitals and nursing note reviewed.   Constitutional:       Appearance: He is ill-appearing.   HENT:      Head: Normocephalic and atraumatic.      Nose: Nose normal.      Mouth/Throat:      Mouth: Mucous membranes are moist.   Eyes:      Extraocular Movements: Extraocular movements intact.      Pupils: Pupils are equal, round, and reactive to light.   Cardiovascular:      Rate and Rhythm: Normal rate. Rhythm irregular.      Pulses: Normal pulses.      Heart sounds: Normal heart sounds.   Pulmonary:      Effort: Pulmonary effort is normal.      Breath sounds: Rales present.   Abdominal:      General: Abdomen is flat. Bowel sounds are normal.      Palpations: Abdomen is soft.   Musculoskeletal:      Cervical back: Normal range of motion and neck supple.      Right lower leg: Edema present.      Left lower leg: Edema " present.   Skin:     General: Skin is warm.      Capillary Refill: Capillary refill takes less than 2 seconds.   Neurological:      General: No focal deficit present.      Mental Status: He is alert.   Psychiatric:         Mood and Affect: Mood normal.     Immunization History   Administered Date(s) Administered    COVID-19 (MODERNA) 1st,2nd,3rd Dose Monovalent 03/03/2021, 03/31/2021, 12/30/2021    COVID-19 (MODERNA) Monovalent Original Booster 12/30/2021    Fluzone Quad >6mos (Multi-dose) 10/20/2021, 10/07/2022    Influenza Quad Vaccine (Inpatient) 11/05/2013, 10/24/2014, 08/21/2015, 11/14/2016, 10/30/2017, 10/31/2018, 10/25/2019, 10/20/2020    Influenza, Unspecified 10/30/2017, 10/31/2018, 10/25/2019, 10/20/2020, 10/20/2021, 10/07/2022    Pneumococcal Conjugate 13-Valent (PCV13) 08/17/2016    Pneumococcal Polysaccharide (PPSV23) 10/25/2019    Tdap 04/10/2023            Assessment & Plan     Diagnoses and all orders for this visit:    1. Dyspnea on exertion (Primary)    2. Obstructive sleep apnea    3. Chronic diastolic CHF (congestive heart failure)    4. Atrial fibrillation, persistent          Discussion/ Recommendations:   We will order PFTs 6-minute walk blood gas and chest x-ray done  Patient seems to have obstructive sleep apnea and will therefore need to have a CPAP patient has had sleep study done and patient's family advised to contact supplies for CPAP  2 g sodium diet  Patient is advised to reduce weight his BMI is 26.91  Patient had a 6-minute walk test during which his oxygen saturations dropped to 88% we will therefore prescribe him oxygen at 2 L/min  Discussed vaccination and recommended    BMI is >= 25 and <30. (Overweight) The following options were offered after discussion;: exercise counseling/recommendations           Return in about 2 months (around 10/24/2023).      Thank you for allowing me to participate in the care of Justyn Galo. Please do not hesitate to contact me with any  questions.         This document has been electronically signed by Trace Lai MD on August 24, 2023 11:10 EDT

## 2023-09-08 ENCOUNTER — OFFICE VISIT (OUTPATIENT)
Dept: NEUROLOGY | Facility: CLINIC | Age: 83
End: 2023-09-08
Payer: MEDICARE

## 2023-09-08 VITALS — BODY MASS INDEX: 27.08 KG/M2 | HEIGHT: 74 IN | WEIGHT: 211 LBS

## 2023-09-08 DIAGNOSIS — G20 PARKINSON'S SYNDROME: Primary | ICD-10-CM

## 2023-09-08 PROBLEM — G20.A1 PARKINSON'S SYNDROME: Status: ACTIVE | Noted: 2023-09-08

## 2023-09-08 PROCEDURE — 1159F MED LIST DOCD IN RCRD: CPT | Performed by: PSYCHIATRY & NEUROLOGY

## 2023-09-08 PROCEDURE — 1160F RVW MEDS BY RX/DR IN RCRD: CPT | Performed by: PSYCHIATRY & NEUROLOGY

## 2023-09-08 PROCEDURE — 99203 OFFICE O/P NEW LOW 30 MIN: CPT | Performed by: PSYCHIATRY & NEUROLOGY

## 2023-09-08 NOTE — ASSESSMENT & PLAN NOTE
I will do a DaTscan.  I will start him on carbidopa/levodopa 25/100.  He sleeps all the time.  He will start taking it at 12 noon, 4 PM and 8 PM.  He is to take 1/2 tablet times a day for 4 days, 1 tablet 3 times a day for 1 week, 1 and half tablets 3 times a day for 10 days then 2 tablets 3 times a day thereafter.  I will see him again in 8 weeks time for follow-up.  He is to use his walker at all times.  I discussed with him and his wife that he needs to follow-up with dermatology regarding his melanoma since carbidopa/levodopa can make melanoma recur.  Thank for letting me participate in his care

## 2023-09-08 NOTE — PROGRESS NOTES
"Chief Complaint  Gait Problem (Home health & physical therapy would like eval for parkinsons.)    Subjective          Justyn Galo is a 83 y.o. male who presents to [facility]  History of Present Illness  83-year-old man evaluated for possible Parkinson's disease/syndrome.  He has been falling down a lot for the last 2 to 3 years.  The physical therapist gave him literature on Lewy body dementia and the wife read to me the literature and told me that he has all of the symptoms.  He falls down and shuffles his feet, shakes in bed the whole time, smaller handwriting, difficulty swallowing, sleepiness, balance problems, stiffness.  The patient has had multiple strokes in the past and has had memory problems for the last 3 years.  He denies having any hallucinations visual or auditory.  According to the wife the patient does at times look forward her granddaughter Tarsha but that is infrequent.  He does not use his walker all the time.  He has had a history of melanoma is being followed by dermatology.  He has atrial fibrillation.  He has had a CT scan of the brain that was performed in April showing multiple chronic appearing infarcts.  Moderate small vessel ischemic changes in the white matter.    Objective   Vital Signs:   Ht 188 cm (74.02\")   Wt 95.7 kg (211 lb)   BMI 27.08 kg/m²     Physical Exam   He is alert, fluent, phasic.  He does have a mild right resting tremor intermittently.  He has mild facial bradykinesia.  EOMs full directions gaze, facial strength is full.  There is no weakness of the upper or lower extremities.  Reflexes are absent and symmetrical.  Station gait is able to ambulate with abnormal gait and that his right leg tends to scissor and.  He does not have a wide-based gait.  Armswing is decreased but does not have the typical characteristic of Parkinson's gait.  Heart is regular in rhythm.        Assessment and Plan  Diagnoses and all orders for " this visit:    1. Parkinson's syndrome (Primary)  Assessment & Plan:  I will do a DaTscan.  I will start him on carbidopa/levodopa 25/100.  He sleeps all the time.  He will start taking it at 12 noon, 4 PM and 8 PM.  He is to take 1/2 tablet times a day for 4 days, 1 tablet 3 times a day for 1 week, 1 and half tablets 3 times a day for 10 days then 2 tablets 3 times a day thereafter.  I will see him again in 8 weeks time for follow-up.  He is to use his walker at all times.  I discussed with him and his wife that he needs to follow-up with dermatology regarding his melanoma since carbidopa/levodopa can make melanoma recur.  Thank for letting me participate in his care    Orders:  -     NM Brain DaTScan; Future    Other orders  -     carbidopa-levodopa (SINEMET)  MG per tablet; Take 1/2 tablet 3 times a day for 4 days, 1 tablet 3 times a day for 1 week, 1 and half tablets 3 times a day for 10 days and 2 tablets 3 times a day thereafter.  Dispense: 180 tablet; Refill: 5         Total time spent with the patient and coordinating patient care was 35 minutes.    Follow Up  No follow-ups on file.  Patient was given instructions and counseling regarding his condition or for health maintenance advice. Please see specific information pulled into the AVS if appropriate.

## 2023-09-11 ENCOUNTER — HOSPITAL ENCOUNTER (OUTPATIENT)
Dept: RESPIRATORY THERAPY | Facility: HOSPITAL | Age: 83
Discharge: HOME | End: 2023-09-11
Payer: MEDICARE

## 2023-09-11 DIAGNOSIS — R06.09 DYSPNEA ON EXERTION: ICD-10-CM

## 2023-09-11 DIAGNOSIS — I50.32 CHRONIC DIASTOLIC CHF (CONGESTIVE HEART FAILURE): ICD-10-CM

## 2023-09-11 DIAGNOSIS — G47.33 OBSTRUCTIVE SLEEP APNEA: ICD-10-CM

## 2023-09-11 PROCEDURE — 94060 EVALUATION OF WHEEZING: CPT

## 2023-09-11 PROCEDURE — 94729 DIFFUSING CAPACITY: CPT

## 2023-09-11 PROCEDURE — 94010 BREATHING CAPACITY TEST: CPT

## 2023-09-11 PROCEDURE — 94726 PLETHYSMOGRAPHY LUNG VOLUMES: CPT

## 2023-09-11 RX ORDER — LEVALBUTEROL INHALATION SOLUTION 1.25 MG/3ML
1.25 SOLUTION RESPIRATORY (INHALATION) ONCE
Status: DISCONTINUED | OUTPATIENT
Start: 2023-09-11 | End: 2023-09-12 | Stop reason: HOSPADM

## 2023-09-15 ENCOUNTER — TELEPHONE (OUTPATIENT)
Dept: CARDIOLOGY | Facility: CLINIC | Age: 83
End: 2023-09-15
Payer: MEDICARE

## 2023-09-15 NOTE — TELEPHONE ENCOUNTER
----- Message from KAYLEY Miller sent at 9/15/2023  1:41 PM EDT -----        ----- Message -----  From: Felicitas Meyer RegSched Rep  Sent: 9/15/2023   8:43 AM EDT  To: KAYLEY Peralta

## 2023-09-20 ENCOUNTER — LAB (OUTPATIENT)
Dept: LAB | Facility: HOSPITAL | Age: 83
End: 2023-09-20
Payer: MEDICARE

## 2023-09-20 DIAGNOSIS — G47.33 OBSTRUCTIVE SLEEP APNEA: ICD-10-CM

## 2023-09-20 DIAGNOSIS — R06.09 DYSPNEA ON EXERTION: ICD-10-CM

## 2023-09-20 DIAGNOSIS — I50.32 CHRONIC DIASTOLIC CHF (CONGESTIVE HEART FAILURE): ICD-10-CM

## 2023-09-20 LAB
BASE EXCESS BLDA CALC-SCNC: 4 MMOL/L (ref -2–2)
BDY SITE: ABNORMAL
COHGB MFR BLD: 0.3 % (ref 0–1.5)
FHHB: 7.6 % (ref 0–5)
HCO3 BLDA-SCNC: 29.7 MMOL/L (ref 22–26)
HGB BLDA-MCNC: 12 G/DL (ref 13.8–16.4)
LACTATE BLDA-SCNC: ABNORMAL MMOL/L
METHGB BLD QL: 0.2 % (ref 0–1.5)
MODALITY: ABNORMAL
OXYHGB MFR BLDV: 91.9 % (ref 94–99)
PCO2 BLDA: 49.9 MM HG (ref 35–45)
PH BLDA: 7.39 PH UNITS (ref 7.35–7.45)
PO2 BLDA: 69.2 MM HG (ref 80–100)
SAO2 % BLDCOA: 92.4 % (ref 95–99)

## 2023-09-20 PROCEDURE — 82805 BLOOD GASES W/O2 SATURATION: CPT

## 2023-09-20 PROCEDURE — 82375 ASSAY CARBOXYHB QUANT: CPT

## 2023-09-20 PROCEDURE — 83050 HGB METHEMOGLOBIN QUAN: CPT

## 2023-09-20 PROCEDURE — 36600 WITHDRAWAL OF ARTERIAL BLOOD: CPT

## 2023-09-21 ENCOUNTER — OFFICE VISIT (OUTPATIENT)
Dept: CARDIOLOGY | Facility: CLINIC | Age: 83
End: 2023-09-21
Payer: MEDICARE

## 2023-09-21 VITALS
WEIGHT: 218 LBS | BODY MASS INDEX: 27.98 KG/M2 | SYSTOLIC BLOOD PRESSURE: 125 MMHG | DIASTOLIC BLOOD PRESSURE: 83 MMHG | HEART RATE: 98 BPM | HEIGHT: 74 IN

## 2023-09-21 DIAGNOSIS — I50.32 CHRONIC HEART FAILURE WITH PRESERVED EJECTION FRACTION (HFPEF): Primary | ICD-10-CM

## 2023-09-21 DIAGNOSIS — I48.19 ATRIAL FIBRILLATION, PERSISTENT: ICD-10-CM

## 2023-09-21 RX ORDER — METOLAZONE 2.5 MG/1
2.5 TABLET ORAL DAILY
Qty: 3 TABLET | Refills: 0 | Status: SHIPPED | OUTPATIENT
Start: 2023-09-21

## 2023-09-21 NOTE — PROGRESS NOTES
"Chief Complaint  Edema    Subjective            History of Present Illness  Justyn Galo is an 83-year-old white/ male patient who presents to the office today directly from PCP Dr Monahan office for increased swelling and weight gain.  Over the last 6 weeks he has gained about 25 pounds with evidence of fluid retention in his abdomen and lower extremities.  He is also having some shortness of breath with physical exertion.  He has just started wearing supplemental oxygen at nighttime per pulmonology.  His wife states he wears 2 L while sleeping.  He denies any chest pain, lightheadedness/dizziness, or palpitations.  He is compliant with medications.    PMH  Past Medical History:   Diagnosis Date    Atrial fibrillation, persistent  10/14/2021    BPH (benign prostatic hyperplasia)     Disease of thyroid gland     Essential hypertension 10/14/2021    Falls frequently     SPOUSE REPORTS MULTIPLE FALLS \"EVERY WEEK. I'VE LOST COUNT.\"    Generalized weakness     History of CVA (cerebrovascular accident)     RESIDUAL OF LEFT SIDE WEAKNESS    Urgency of urination     WEARS BRIEFS AT HOME         ALLERGY  Allergies   Allergen Reactions    Loratadine Other (See Comments) and Unknown - Low Severity     PATIENT & SPOUSE SAID IF HE TAKES IT TO LONG (OR TO MANY DAYS IN A ROW) IT DRIES HIM OUT & MAKES HIS NOSE BLEED.          SURGICALHX  Past Surgical History:   Procedure Laterality Date    LEG SURGERY Right           SOC  Social History     Socioeconomic History    Marital status:    Tobacco Use    Smoking status: Former     Packs/day: 1.00     Types: Cigarettes     Start date:      Quit date: 10/14/1961     Years since quittin.9     Passive exposure: Never    Smokeless tobacco: Never   Vaping Use    Vaping Use: Never used   Substance and Sexual Activity    Alcohol use: Never    Drug use: Never    Sexual activity: Defer         FAMHX  History reviewed. No pertinent family history. " "      ELMER  Current Outpatient Medications on File Prior to Visit   Medication Sig    acetaminophen (TYLENOL) 500 MG tablet Take 1 tablet by mouth Every 6 (Six) Hours As Needed for Mild Pain.    carbidopa-levodopa (SINEMET)  MG per tablet Take 1/2 tablet 3 times a day for 4 days, 1 tablet 3 times a day for 1 week, 1 and half tablets 3 times a day for 10 days and 2 tablets 3 times a day thereafter.    carvedilol (COREG) 25 MG tablet Take 0.5 tablets by mouth 2 (Two) Times a Day.    Cholecalciferol 125 MCG (5000 UT) tablet Take 1 tablet by mouth Daily.    dilTIAZem XR (DILACOR XR) 120 MG 24 hr capsule Take 1 capsule by mouth Daily.    escitalopram (LEXAPRO) 20 MG tablet Take 1 tablet by mouth Daily.    ezetimibe (ZETIA) 10 MG tablet Take 1 tablet by mouth Daily.    finasteride (PROSCAR) 5 MG tablet Take 1 tablet by mouth Daily.    furosemide (LASIX) 20 MG tablet Take 1 tablet by mouth Daily.    levothyroxine (SYNTHROID, LEVOTHROID) 50 MCG tablet Take 1 tablet by mouth Daily.    Loratadine 10 MG capsule Take  by mouth.    meclizine (ANTIVERT) 25 MG tablet Take 1 tablet by mouth 3 (Three) Times a Day As Needed for Dizziness.    multivitamin with minerals tablet tablet Take 1 tablet by mouth Daily.    potassium chloride 10 MEQ CR tablet Take 1 tablet by mouth Daily.    rivaroxaban (Xarelto) 20 MG tablet Take 1 tablet by mouth Daily With Dinner.    tamsulosin (FLOMAX) 0.4 MG capsule 24 hr capsule Take 1 capsule by mouth Daily.    [DISCONTINUED] chlorhexidine (PERIDEX) 0.12 % solution Apply 15 mL to the mouth or throat 4 (Four) Times a Day. (Patient not taking: Reported on 9/21/2023)     No current facility-administered medications on file prior to visit.         Objective   /83 (BP Location: Left arm, Patient Position: Sitting, Cuff Size: Adult)   Pulse 98   Ht 188 cm (74.02\")   Wt 98.9 kg (218 lb)   BMI 27.97 kg/m²       Physical Exam  HENT:      Head: Normocephalic.   Neck:      Vascular: No " carotid bruit.   Cardiovascular:      Rate and Rhythm: Tachycardia present. Rhythm irregular.      Pulses: Normal pulses.      Heart sounds: Normal heart sounds. No murmur heard.  Pulmonary:      Effort: Pulmonary effort is normal.      Breath sounds: Normal breath sounds.   Musculoskeletal:      Cervical back: Neck supple.      Right lower le+ Pitting No edema.      Left lower le+ Pitting No edema.   Skin:     General: Skin is dry.   Neurological:      Mental Status: He is alert and oriented to person, place, and time.   Psychiatric:         Behavior: Behavior normal.     ECG 12 Lead    Date/Time: 2023 12:05 PM  Performed by: Peggy Ordonez APRN  Authorized by: Peggy Ordonez APRN   Comparison: compared with previous ECG from 2023  Similar to previous ECG  Rhythm: atrial fibrillation  Rate: tachycardic  BPM: 113  Conduction: conduction normal  ST Segments: ST segments normal  T Waves: T waves normal  QRS axis: normal  Other: no other findings    Clinical impression: abnormal EKG      Result Review :   The following data was reviewed by: KAYLEY Barksdale on 2023:  proBNP   Date Value Ref Range Status   2023 1,128.0 0.0 - 1,800.0 pg/mL Final     CMP          2023    09:30   CMP   Glucose 99    BUN 26    Creatinine 1.18    EGFR 61.6    Sodium 141    Potassium 4.3    Chloride 105    Calcium 9.9    Total Protein    Albumin    Globulin    Total Bilirubin    Alkaline Phosphatase    AST (SGOT)    ALT (SGPT)    Albumin/Globulin Ratio    BUN/Creatinine Ratio 22.0    Anion Gap 10.9      CBC w/diff          2023    05:21   CBC w/Diff   WBC 5.95    RBC 3.09    Hemoglobin 10.6    Hematocrit 32.1    .9    MCH 34.3    MCHC 33.0    RDW 13.5    Platelets 174    Neutrophil Rel %    Immature Granulocyte Rel %    Lymphocyte Rel %    Monocyte Rel %    Eosinophil Rel %    Basophil Rel %       Lab Results   Component Value Date    TSH 1.830 2022      No results found  for: FREET4   No results found for: DDIMERQUANT  Magnesium   Date Value Ref Range Status   07/08/2023 1.9 1.6 - 2.4 mg/dL Final      No results found for: DIGOXIN   Lab Results   Component Value Date    TROPONINT 28 (H) 07/07/2023           Results for orders placed during the hospital encounter of 11/10/21    Adult Transthoracic Echo Complete W/ Cont if Necessary Per Protocol    Interpretation Summary  Fibrocalcific mitral and aortic valves.  Normal left ventricular systolic function.  Biatrial enlargement.  Mild mitral regurgitation.  Moderate tricuspid regurgitation.         Assessment and Plan    Diagnoses and all orders for this visit:    1. Chronic heart failure with preserved ejection fraction (HFpEF) (Primary)  He has evidence of fluid overload on exam today.  He will take metolazone 2.5 mg daily for the next 3 days.  Continue Lasix 20 mg daily.  Obtain echocardiogram tomorrow to assess left ventricular systolic function.  Obtain proBNP and BMP on Monday to assess renal function and CHF.  He will follow-up with me on Tuesday and we will do repeat EKG at that time.  -     Adult Transthoracic Echo Complete W/ Cont if Necessary Per Protocol; Future  -     Basic Metabolic Panel; Future  -     proBNP; Future    2. Atrial fibrillation, persistent   EKG in office today shows uncontrolled rate, 113 bpm, this will hopefully come down as fluid is excreted.  We will recheck EKG on Tuesday.  Continue carvedilol 12.5 mg twice daily and diltiazem 120 mg daily for now.  Continue Xarelto for CVA prevention.    Other orders  -     metOLazone (ZAROXOLYN) 2.5 MG tablet; Take 1 tablet by mouth Daily.  Dispense: 3 tablet; Refill: 0  -     ECG 12 Lead        Follow Up   Return for Follow up with Dr Ortega as scheduled.    Patient was given instructions and counseling regarding his condition or for health maintenance advice. Please see specific information pulled into the AVS if appropriate.     Justyn Galo  reports that he  quit smoking about 61 years ago. His smoking use included cigarettes. He started smoking about 70 years ago. He smoked an average of 1 pack per day. He has never been exposed to tobacco smoke. He has never used smokeless tobacco.           Peggy Ordonez, KAYLEY  09/21/23  11:47 EDT    Dictated Utilizing Dragon Dictation

## 2023-09-25 ENCOUNTER — LAB (OUTPATIENT)
Dept: LAB | Facility: HOSPITAL | Age: 83
End: 2023-09-25
Payer: MEDICARE

## 2023-09-25 DIAGNOSIS — I50.32 CHRONIC HEART FAILURE WITH PRESERVED EJECTION FRACTION (HFPEF): ICD-10-CM

## 2023-09-25 PROCEDURE — 36415 COLL VENOUS BLD VENIPUNCTURE: CPT

## 2023-09-25 PROCEDURE — 83880 ASSAY OF NATRIURETIC PEPTIDE: CPT

## 2023-09-25 PROCEDURE — 80048 BASIC METABOLIC PNL TOTAL CA: CPT

## 2023-09-26 ENCOUNTER — OFFICE VISIT (OUTPATIENT)
Dept: CARDIOLOGY | Facility: CLINIC | Age: 83
End: 2023-09-26
Payer: MEDICARE

## 2023-09-26 VITALS
BODY MASS INDEX: 26.95 KG/M2 | HEART RATE: 94 BPM | DIASTOLIC BLOOD PRESSURE: 77 MMHG | SYSTOLIC BLOOD PRESSURE: 116 MMHG | HEIGHT: 74 IN | WEIGHT: 210 LBS

## 2023-09-26 DIAGNOSIS — I50.32 CHRONIC HEART FAILURE WITH PRESERVED EJECTION FRACTION (HFPEF): Primary | ICD-10-CM

## 2023-09-26 LAB
ANION GAP SERPL CALCULATED.3IONS-SCNC: 13.3 MMOL/L (ref 5–15)
BUN SERPL-MCNC: 26 MG/DL (ref 8–23)
BUN/CREAT SERPL: 17.7 (ref 7–25)
CALCIUM SPEC-SCNC: 10.4 MG/DL (ref 8.6–10.5)
CHLORIDE SERPL-SCNC: 96 MMOL/L (ref 98–107)
CO2 SERPL-SCNC: 29.7 MMOL/L (ref 22–29)
CREAT SERPL-MCNC: 1.47 MG/DL (ref 0.76–1.27)
EGFRCR SERPLBLD CKD-EPI 2021: 47.3 ML/MIN/1.73
GLUCOSE SERPL-MCNC: 89 MG/DL (ref 65–99)
NT-PROBNP SERPL-MCNC: 840 PG/ML (ref 0–1800)
POTASSIUM SERPL-SCNC: 4.9 MMOL/L (ref 3.5–5.2)
SODIUM SERPL-SCNC: 139 MMOL/L (ref 136–145)

## 2023-09-26 RX ORDER — FUROSEMIDE 40 MG/1
40 TABLET ORAL DAILY
Qty: 90 TABLET | Refills: 1 | Status: SHIPPED | OUTPATIENT
Start: 2023-09-26

## 2023-09-26 RX ORDER — CHLORHEXIDINE GLUCONATE ORAL RINSE 1.2 MG/ML
15 SOLUTION DENTAL 2 TIMES DAILY
COMMUNITY

## 2023-09-26 NOTE — PROGRESS NOTES
"Chief Complaint  Follow-up (On swelling ) and Hypertension    Subjective            History of Present Illness  Justyn Galo is a an 83-year-old white/ male patient who presents to the office today for follow-up regarding his diastolic heart failure.  He was seen on 2023 for exacerbation of heart failure.  I put him on metolazone for 3 days and continue his Lasix 20 mg daily.  We also had discussed reducing fluid and sodium intake in his diet.  As of today he is achieved an 8 pound weight loss and has improvement in swelling and shortness of breath.  He continues to have some mild edema.  He has also started wearing compression socks.    PMH  Past Medical History:   Diagnosis Date    Atrial fibrillation, persistent  10/14/2021    BPH (benign prostatic hyperplasia)     Disease of thyroid gland     Essential hypertension 10/14/2021    Falls frequently     SPOUSE REPORTS MULTIPLE FALLS \"EVERY WEEK. I'VE LOST COUNT.\"    Generalized weakness     History of CVA (cerebrovascular accident)     RESIDUAL OF LEFT SIDE WEAKNESS    Urgency of urination     WEARS BRIEFS AT HOME         ALLERGY  Allergies   Allergen Reactions    Loratadine Other (See Comments) and Unknown - Low Severity     PATIENT & SPOUSE SAID IF HE TAKES IT TO LONG (OR TO MANY DAYS IN A ROW) IT DRIES HIM OUT & MAKES HIS NOSE BLEED.          SURGICALHX  Past Surgical History:   Procedure Laterality Date    LEG SURGERY Right           SOC  Social History     Socioeconomic History    Marital status:    Tobacco Use    Smoking status: Former     Packs/day: 1.00     Types: Cigarettes     Start date:      Quit date: 10/14/1961     Years since quittin.9     Passive exposure: Never    Smokeless tobacco: Never   Vaping Use    Vaping Use: Never used   Substance and Sexual Activity    Alcohol use: Never    Drug use: Never    Sexual activity: Defer         FAMHX  History reviewed. No pertinent family history.       MEDSIGONLY  Current " "Outpatient Medications on File Prior to Visit   Medication Sig    acetaminophen (TYLENOL) 500 MG tablet Take 1 tablet by mouth Every 6 (Six) Hours As Needed for Mild Pain.    carbidopa-levodopa (SINEMET)  MG per tablet Take 1/2 tablet 3 times a day for 4 days, 1 tablet 3 times a day for 1 week, 1 and half tablets 3 times a day for 10 days and 2 tablets 3 times a day thereafter.    carvedilol (COREG) 25 MG tablet Take 0.5 tablets by mouth 2 (Two) Times a Day.    chlorhexidine (PERIDEX) 0.12 % solution Apply 15 mL to the mouth or throat 2 (Two) Times a Day.    Cholecalciferol 125 MCG (5000 UT) tablet Take 1 tablet by mouth Daily.    dilTIAZem XR (DILACOR XR) 120 MG 24 hr capsule Take 1 capsule by mouth Daily.    escitalopram (LEXAPRO) 20 MG tablet Take 1 tablet by mouth Daily.    ezetimibe (ZETIA) 10 MG tablet Take 1 tablet by mouth Daily.    finasteride (PROSCAR) 5 MG tablet Take 1 tablet by mouth Daily.    furosemide (LASIX) 20 MG tablet Take 1 tablet by mouth Daily.    levothyroxine (SYNTHROID, LEVOTHROID) 50 MCG tablet Take 1 tablet by mouth Daily.    Loratadine 10 MG capsule Take  by mouth.    meclizine (ANTIVERT) 25 MG tablet Take 1 tablet by mouth 3 (Three) Times a Day As Needed for Dizziness.    multivitamin with minerals tablet tablet Take 1 tablet by mouth Daily.    potassium chloride 10 MEQ CR tablet Take 1 tablet by mouth Daily.    rivaroxaban (Xarelto) 20 MG tablet Take 1 tablet by mouth Daily With Dinner.    tamsulosin (FLOMAX) 0.4 MG capsule 24 hr capsule Take 1 capsule by mouth Daily.    metOLazone (ZAROXOLYN) 2.5 MG tablet Take 1 tablet by mouth Daily. (Patient not taking: Reported on 9/26/2023)     No current facility-administered medications on file prior to visit.         Objective   /77 (BP Location: Left arm, Patient Position: Sitting)   Pulse 94   Ht 188 cm (74\")   Wt 95.3 kg (210 lb)   BMI 26.96 kg/m²       Physical Exam  HENT:      Head: Normocephalic.   Neck:      Vascular: No " carotid bruit.   Cardiovascular:      Rate and Rhythm: Normal rate and regular rhythm.      Pulses: Normal pulses.      Heart sounds: Normal heart sounds.   Pulmonary:      Effort: Pulmonary effort is normal.      Breath sounds: Normal breath sounds.   Musculoskeletal:      Cervical back: Neck supple.      Right lower le+ Pitting Edema present.      Left lower le+ Pitting Edema present.   Skin:     General: Skin is dry.   Neurological:      Mental Status: He is alert and oriented to person, place, and time.   Psychiatric:         Behavior: Behavior normal.     Result Review :   The following data was reviewed by: KAYLEY Barksdale on 2023:  proBNP   Date Value Ref Range Status   2023 840.0 0.0 - 1,800.0 pg/mL Final     CMP          2023    12:57   CMP   Glucose 89    BUN 26    Creatinine 1.47    EGFR 47.3    Sodium 139    Potassium 4.9    Chloride 96    Calcium 10.4    BUN/Creatinine Ratio 17.7    Anion Gap 13.3      CBC w/diff          2023    05:21   CBC w/Diff   WBC 5.95    RBC 3.09    Hemoglobin 10.6    Hematocrit 32.1    .9    MCH 34.3    MCHC 33.0    RDW 13.5    Platelets 174    Neutrophil Rel %    Immature Granulocyte Rel %    Lymphocyte Rel %    Monocyte Rel %    Eosinophil Rel %    Basophil Rel %       Lab Results   Component Value Date    TSH 1.830 2022      No results found for: FREET4   No results found for: DDIMERQUANT  Magnesium   Date Value Ref Range Status   2023 1.9 1.6 - 2.4 mg/dL Final      No results found for: DIGOXIN   Lab Results   Component Value Date    TROPONINT 28 (H) 2023           Results for orders placed in visit on 23    Adult Transthoracic Echo Complete W/ Cont if Necessary Per Protocol    Interpretation Summary    Left ventricular systolic function is normal. Calculated left ventricular EF = 55.3%    Left ventricular diastolic function was indeterminate.    The right ventricular cavity is borderline dilated.    The left  atrial cavity is moderately dilated.    The right atrial cavity is moderately  dilated.    There is calcification of the aortic valve.    Moderate tricuspid valve regurgitation is present.    Estimated right ventricular systolic pressure from tricuspid regurgitation is mildly elevated (35-45 mmHg).         Assessment and Plan    Diagnoses and all orders for this visit:    1. Chronic heart failure with preserved ejection fraction (HFpEF) (Primary)  Echocardiogram showed preserved ejection fraction.  Since he still has some evidence of fluid overload on exam, increase Lasix dose to 40 mg daily.  Continue potassium 10 mill equivalent daily.  Check BMP and proBNP in 4 weeks to reassess renal function and electrolytes.  -     Basic Metabolic Panel; Future  -     proBNP; Future    Other orders  -     furosemide (LASIX) 40 MG tablet; Take 1 tablet by mouth Daily.  Dispense: 90 tablet; Refill: 1            Follow Up   Return in about 3 months (around 12/26/2023) for Follow up with Dr Ortega.    Patient was given instructions and counseling regarding his condition or for health maintenance advice. Please see specific information pulled into the AVS if appropriate.     Justyn Galo  reports that he quit smoking about 61 years ago. His smoking use included cigarettes. He started smoking about 70 years ago. He smoked an average of 1 pack per day. He has never been exposed to tobacco smoke. He has never used smokeless tobacco.           KAYLEY Barksdale  09/26/23  12:08 EDT    Dictated Utilizing Dragon Dictation

## 2023-09-29 ENCOUNTER — TELEPHONE (OUTPATIENT)
Dept: NEUROLOGY | Facility: CLINIC | Age: 83
End: 2023-09-29

## 2023-09-29 NOTE — TELEPHONE ENCOUNTER
Caller: ISAAC DOUGLASS    Relationship: Emergency Contact; SPOUSE    Best call back number: [phone]    What was the call regarding: PT'S WIFE STATES SHE SPOKE W/ ADAM EARLY THIS MORNING AND WAS ADVISED THEY HAVE NOT YET RECEIVED THE ORDER/REFERRAL. PT'S WIFE WAS PROVIDED THE FAX # [phone] TO RESEND ORDER/REFERRAL TO.    Do you require a callback: YES, PLEASE NOTIFY PT'S WIFE ONCE REFERRAL HAS BEEN RESENT.    PLEASE REVIEW AND ADVISE.

## 2023-10-02 ENCOUNTER — TELEPHONE (OUTPATIENT)
Dept: CARDIOLOGY | Facility: CLINIC | Age: 83
End: 2023-10-02

## 2023-10-02 NOTE — TELEPHONE ENCOUNTER
The [facility] received a fax that requires your attention. The document has been indexed to the patient’s chart for your review.      Reason for sending: PROGRESS NOTE    Documents Description: [id number]    Name of Sender: [facility]    Date Indexed: 10-2-23

## 2023-10-20 ENCOUNTER — TRANSCRIBE ORDERS (OUTPATIENT)
Dept: GENERAL RADIOLOGY | Facility: HOSPITAL | Age: 83
End: 2023-10-20
Payer: MEDICARE

## 2023-10-20 ENCOUNTER — HOSPITAL ENCOUNTER (OUTPATIENT)
Dept: GENERAL RADIOLOGY | Facility: HOSPITAL | Age: 83
Discharge: HOME | End: 2023-10-20
Admitting: INTERNAL MEDICINE
Payer: MEDICARE

## 2023-10-20 DIAGNOSIS — R05.1 ACUTE COUGH: ICD-10-CM

## 2023-10-20 DIAGNOSIS — R05.1 ACUTE COUGH: Primary | ICD-10-CM

## 2023-10-20 PROCEDURE — 71046 X-RAY EXAM CHEST 2 VIEWS: CPT

## 2023-10-26 ENCOUNTER — OFFICE VISIT (OUTPATIENT)
Dept: PULMONOLOGY | Facility: CLINIC | Age: 83
End: 2023-10-26
Payer: MEDICARE

## 2023-10-26 VITALS
HEIGHT: 74 IN | WEIGHT: 212.6 LBS | HEART RATE: 105 BPM | DIASTOLIC BLOOD PRESSURE: 85 MMHG | RESPIRATION RATE: 20 BRPM | SYSTOLIC BLOOD PRESSURE: 136 MMHG | OXYGEN SATURATION: 92 % | BODY MASS INDEX: 27.28 KG/M2 | TEMPERATURE: 98.9 F

## 2023-10-26 DIAGNOSIS — I48.19 ATRIAL FIBRILLATION, PERSISTENT: ICD-10-CM

## 2023-10-26 DIAGNOSIS — G47.33 OBSTRUCTIVE SLEEP APNEA: Primary | ICD-10-CM

## 2023-10-26 DIAGNOSIS — J98.11 ATELECTASIS: ICD-10-CM

## 2023-10-26 DIAGNOSIS — I50.32 CHRONIC DIASTOLIC CHF (CONGESTIVE HEART FAILURE): ICD-10-CM

## 2023-10-26 PROCEDURE — 3079F DIAST BP 80-89 MM HG: CPT | Performed by: INTERNAL MEDICINE

## 2023-10-26 PROCEDURE — 1160F RVW MEDS BY RX/DR IN RCRD: CPT | Performed by: INTERNAL MEDICINE

## 2023-10-26 PROCEDURE — 1159F MED LIST DOCD IN RCRD: CPT | Performed by: INTERNAL MEDICINE

## 2023-10-26 PROCEDURE — 99213 OFFICE O/P EST LOW 20 MIN: CPT | Performed by: INTERNAL MEDICINE

## 2023-10-26 PROCEDURE — 3075F SYST BP GE 130 - 139MM HG: CPT | Performed by: INTERNAL MEDICINE

## 2023-10-26 RX ORDER — CARBIDOPA 25 MG/1
25 TABLET ORAL 3 TIMES DAILY
COMMUNITY

## 2023-10-26 RX ORDER — TIOTROPIUM BROMIDE INHALATION SPRAY 3.12 UG/1
2 SPRAY, METERED RESPIRATORY (INHALATION)
Qty: 2 EACH | Refills: 0 | COMMUNITY
Start: 2023-10-26 | End: 2023-10-27

## 2023-10-26 NOTE — PROGRESS NOTES
Pulmonary Office Follow-up    Subjective     Justyn Galo is seen today at the office for   Chief Complaint   Patient presents with    Follow-up    Sleep Apnea         HPI  Justyn Galo is a 83 y.o. male with a PMH significant for parkinsonism along with CHF and recurrent bronchitis with possible aspiration presents for follow-up patient recently finished a course of antibiotics for treatment of his aspiration pneumonia he continues to have some shortness of breath especially at night with noisy breathing patient does not use his CPAP but is on oxygen at night      Tobacco use history:  Former smoker      Patient Active Problem List   Diagnosis    Atrial fibrillation, persistent     Hypertension    History of CVA (cerebrovascular accident)    Hypothyroidism    Gross hematuria    Iron deficiency anemia    Olecranon bursitis of right elbow    Diarrhea with dehydration    Acute kidney failure    Recurrent falls    Chronic heart failure with preserved ejection fraction (HFpEF)    Parkinson's syndrome       Review of Systems  Review of Systems   Respiratory:  Positive for cough and shortness of breath.    All other systems reviewed and are negative.    As described in the HPI. Otherwise, remainder of ROS (14 systems) were negative.    Medications, Allergies, Social, and Family Histories reviewed as per EMR.    Objective     Vitals:    10/26/23 1102   BP: 136/85   Pulse: 105   Resp: 20   Temp: 98.9 °F (37.2 °C)   SpO2: 92%         10/26/23  1102   Weight: 96.4 kg (212 lb 9.6 oz)       Physical Exam  Vitals and nursing note reviewed.   Constitutional:       Appearance: Normal appearance.   HENT:      Head: Normocephalic and atraumatic.      Nose: Nose normal.      Mouth/Throat:      Mouth: Mucous membranes are moist.      Pharynx: Oropharynx is clear.   Eyes:      Extraocular Movements: Extraocular movements intact.      Conjunctiva/sclera: Conjunctivae normal.      Pupils: Pupils are equal, round, and  reactive to light.   Cardiovascular:      Rate and Rhythm: Normal rate. Rhythm irregular.      Pulses: Normal pulses.   Pulmonary:      Effort: Pulmonary effort is normal.      Breath sounds: Rales present.   Abdominal:      General: Abdomen is flat. Bowel sounds are normal.      Palpations: Abdomen is soft.   Musculoskeletal:         General: Normal range of motion.      Cervical back: Normal range of motion and neck supple.   Skin:     General: Skin is warm.      Capillary Refill: Capillary refill takes 2 to 3 seconds.   Neurological:      Mental Status: He is alert and oriented to person, place, and time. Mental status is at baseline.   Psychiatric:         Mood and Affect: Mood normal.         Behavior: Behavior normal.         XR Chest PA & Lateral    Result Date: 10/20/2023    1. Bibasilar densities which could relate to atelectasis.     YENIFER SANCHEZ MD       Electronically Signed and Approved By: YENIFER SANCHEZ MD on 10/20/2023 at 17:47             XR Chest 2 View    Result Date: 8/24/2023     1. Bibasilar atelectasis with no suspicious infiltrate  2. Cardiomegaly with no pulmonary edema     BRICE RENEE MD       Electronically Signed and Approved By: BRICE RENEE MD on 8/24/2023 at 12:50               Assessment & Plan     Diagnoses and all orders for this visit:    1. Obstructive sleep apnea (Primary)    2. Atelectasis    3. Chronic diastolic CHF (congestive heart failure)    4. Atrial fibrillation, persistent     Other orders  -     tiotropium bromide monohydrate (SPIRIVA RESPIMAT) 2.5 MCG/ACT aerosol solution inhaler; Inhale 2 puffs Daily.  Dispense: 1 each; Refill: 5         Discussion/ Recommendations:   We will start him on Spiriva daily  Patient has already finished a course of antibiotics  Advised mobilization and deep breathing exercises  Acapella device  Vaccinations discussed and recommended             Return in about 6 months (around 4/26/2024).          This document has been electronically  signed by Trace Lai MD on October 26, 2023 11:10 EDT

## 2023-11-21 ENCOUNTER — OFFICE VISIT (OUTPATIENT)
Dept: NEUROLOGY | Facility: CLINIC | Age: 83
End: 2023-11-21
Payer: MEDICARE

## 2023-11-21 VITALS
WEIGHT: 209 LBS | DIASTOLIC BLOOD PRESSURE: 62 MMHG | HEART RATE: 88 BPM | HEIGHT: 74 IN | BODY MASS INDEX: 26.82 KG/M2 | SYSTOLIC BLOOD PRESSURE: 129 MMHG

## 2023-11-21 DIAGNOSIS — G20.A1 PARKINSON'S DISEASE WITHOUT DYSKINESIA OR FLUCTUATING MANIFESTATIONS: Primary | ICD-10-CM

## 2023-11-21 PROCEDURE — 3078F DIAST BP <80 MM HG: CPT | Performed by: PSYCHIATRY & NEUROLOGY

## 2023-11-21 PROCEDURE — 1160F RVW MEDS BY RX/DR IN RCRD: CPT | Performed by: PSYCHIATRY & NEUROLOGY

## 2023-11-21 PROCEDURE — 1159F MED LIST DOCD IN RCRD: CPT | Performed by: PSYCHIATRY & NEUROLOGY

## 2023-11-21 PROCEDURE — 99213 OFFICE O/P EST LOW 20 MIN: CPT | Performed by: PSYCHIATRY & NEUROLOGY

## 2023-11-21 PROCEDURE — 3074F SYST BP LT 130 MM HG: CPT | Performed by: PSYCHIATRY & NEUROLOGY

## 2023-11-21 NOTE — PROGRESS NOTES
"Chief Complaint  Results (Datscan) and Med Check    Subjective          Justyn Galo is a 83 y.o. male who presents to [facility]  History of Present Illness  83-year-old man here for follow-up of his DaTscan which is positive.  He is taking carbidopa/levodopa 25/100 2 tablets 3 times a day at 12 noon, 4 PM and 8 PM.  He thinks he is a little bit steadier.  There still is times when he shuffles his feet according to his wife.  It is difficult getting up from the chair at times.  He needs an arm rest to push-up.  He may have fallen twice.  He does not use his rollator walker all the time.  His wife is with him today.    Objective   Vital Signs:   /62   Pulse 88   Ht 188 cm (74.02\")   Wt 94.8 kg (209 lb)   BMI 26.82 kg/m²     Physical Exam   He is alert, fluent, phasic, follows commands.  He has difficulty in understanding the instructions and had to ask me again regarding what I told him regarding the diagnosis and the DaTscan.  He is able to ambulate and get up pushing from an arm chair without difficulty and able to move around his wheeled walker without difficulty as long as he is using it.  Is able to turn widely without difficulty.  He is safe.  He is not shuffling his gait.  He is able to sit down without difficulty.  Heart is regular and rhythm normal in rate.        Assessment and Plan  Diagnoses and all orders for this visit:    1. Parkinson's disease without dyskinesia or fluctuating manifestations (Primary)  Assessment & Plan:  I discussed with them that he is going to increase carbidopa/levodopa to 2-1/2 tablets 3 times a day this week and then 3 tablets 3 times a day next week and on.  I discussed with the wife to look for hallucinations, delusions, confusion worsening.  It may also make him more sleepy and he is already sleepy at this time.  I discussed with them that there medication is there to help him so that he can move better and he is able " to move around without shuffling his feet.  I will see him again in 3 months time for follow-up.  Thank you for letting me participate in his care.      Other orders  -     carbidopa-levodopa (SINEMET)  MG per tablet; Titrate as directed and take up to 3 tablets 3 times a day at 12 noon, 4 PM and 8 PM.  Dispense: 270 tablet; Refill: 5         Total time spent with the patient and coordinating patient care was 20 minutes.    Follow Up  No follow-ups on file.  Patient was given instructions and counseling regarding his condition or for health maintenance advice. Please see specific information pulled into the AVS if appropriate.

## 2023-11-21 NOTE — ASSESSMENT & PLAN NOTE
I discussed with them that he is going to increase carbidopa/levodopa to 2-1/2 tablets 3 times a day this week and then 3 tablets 3 times a day next week and on.  I discussed with the wife to look for hallucinations, delusions, confusion worsening.  It may also make him more sleepy and he is already sleepy at this time.  I discussed with them that there medication is there to help him so that he can move better and he is able to move around without shuffling his feet.  I will see him again in 3 months time for follow-up.  Thank you for letting me participate in his care.

## 2023-12-05 ENCOUNTER — TELEPHONE (OUTPATIENT)
Dept: CARDIOLOGY | Facility: CLINIC | Age: 83
End: 2023-12-05

## 2023-12-05 NOTE — TELEPHONE ENCOUNTER
The [facility] received a fax that requires your attention. The document has been indexed to the patient’s chart for your review.      Reason for sending: EXTERNAL MEDICAL RECORD NOTIFICATION     Documents Description: [id number]    Name of Sender: [facility]     Date Indexed: 12.4.23

## 2023-12-20 ENCOUNTER — APPOINTMENT (OUTPATIENT)
Dept: CT IMAGING | Facility: HOSPITAL | Age: 83
End: 2023-12-20
Payer: MEDICARE

## 2023-12-20 ENCOUNTER — APPOINTMENT (OUTPATIENT)
Dept: GENERAL RADIOLOGY | Facility: HOSPITAL | Age: 83
End: 2023-12-20
Payer: MEDICARE

## 2023-12-20 ENCOUNTER — HOSPITAL ENCOUNTER (EMERGENCY)
Facility: HOSPITAL | Age: 83
Discharge: HOME | End: 2023-12-20
Attending: EMERGENCY MEDICINE | Admitting: EMERGENCY MEDICINE
Payer: MEDICARE

## 2023-12-20 VITALS
BODY MASS INDEX: 25.89 KG/M2 | RESPIRATION RATE: 14 BRPM | HEART RATE: 72 BPM | TEMPERATURE: 98 F | SYSTOLIC BLOOD PRESSURE: 103 MMHG | OXYGEN SATURATION: 95 % | WEIGHT: 201.72 LBS | HEIGHT: 74 IN | DIASTOLIC BLOOD PRESSURE: 78 MMHG

## 2023-12-20 DIAGNOSIS — S00.03XA CONTUSION OF SCALP, INITIAL ENCOUNTER: ICD-10-CM

## 2023-12-20 DIAGNOSIS — I95.9 HYPOTENSION, UNSPECIFIED HYPOTENSION TYPE: Primary | ICD-10-CM

## 2023-12-20 LAB
ALBUMIN SERPL-MCNC: 4.4 G/DL (ref 3.5–5.2)
ALBUMIN/GLOB SERPL: 1.3 G/DL
ALP SERPL-CCNC: 112 U/L (ref 39–117)
ALT SERPL W P-5'-P-CCNC: 6 U/L (ref 1–41)
ANION GAP SERPL CALCULATED.3IONS-SCNC: 9.5 MMOL/L (ref 5–15)
AST SERPL-CCNC: 20 U/L (ref 1–40)
BASOPHILS # BLD AUTO: 0.02 10*3/MM3 (ref 0–0.2)
BASOPHILS NFR BLD AUTO: 0.3 % (ref 0–1.5)
BILIRUB SERPL-MCNC: 0.8 MG/DL (ref 0–1.2)
BILIRUB UR QL STRIP: NEGATIVE
BUN SERPL-MCNC: 24 MG/DL (ref 8–23)
BUN/CREAT SERPL: 19.5 (ref 7–25)
CALCIUM SPEC-SCNC: 9.9 MG/DL (ref 8.6–10.5)
CHLORIDE SERPL-SCNC: 98 MMOL/L (ref 98–107)
CLARITY UR: CLEAR
CO2 SERPL-SCNC: 27.5 MMOL/L (ref 22–29)
COLOR UR: YELLOW
CREAT SERPL-MCNC: 1.23 MG/DL (ref 0.76–1.27)
DEPRECATED RDW RBC AUTO: 52 FL (ref 37–54)
EGFRCR SERPLBLD CKD-EPI 2021: 58.6 ML/MIN/1.73
EOSINOPHIL # BLD AUTO: 0.17 10*3/MM3 (ref 0–0.4)
EOSINOPHIL NFR BLD AUTO: 3 % (ref 0.3–6.2)
ERYTHROCYTE [DISTWIDTH] IN BLOOD BY AUTOMATED COUNT: 13.8 % (ref 12.3–15.4)
GLOBULIN UR ELPH-MCNC: 3.3 GM/DL
GLUCOSE SERPL-MCNC: 110 MG/DL (ref 65–99)
GLUCOSE UR STRIP-MCNC: NEGATIVE MG/DL
HCT VFR BLD AUTO: 39.5 % (ref 37.5–51)
HGB BLD-MCNC: 12.7 G/DL (ref 13–17.7)
HGB UR QL STRIP.AUTO: NEGATIVE
HOLD SPECIMEN: NORMAL
HOLD SPECIMEN: NORMAL
IMM GRANULOCYTES # BLD AUTO: 0.02 10*3/MM3 (ref 0–0.05)
IMM GRANULOCYTES NFR BLD AUTO: 0.3 % (ref 0–0.5)
KETONES UR QL STRIP: NEGATIVE
LEUKOCYTE ESTERASE UR QL STRIP.AUTO: NEGATIVE
LYMPHOCYTES # BLD AUTO: 1.53 10*3/MM3 (ref 0.7–3.1)
LYMPHOCYTES NFR BLD AUTO: 26.6 % (ref 19.6–45.3)
MAGNESIUM SERPL-MCNC: 2.1 MG/DL (ref 1.6–2.4)
MCH RBC QN AUTO: 32.9 PG (ref 26.6–33)
MCHC RBC AUTO-ENTMCNC: 32.2 G/DL (ref 31.5–35.7)
MCV RBC AUTO: 102.3 FL (ref 79–97)
MONOCYTES # BLD AUTO: 0.43 10*3/MM3 (ref 0.1–0.9)
MONOCYTES NFR BLD AUTO: 7.5 % (ref 5–12)
NEUTROPHILS NFR BLD AUTO: 3.58 10*3/MM3 (ref 1.7–7)
NEUTROPHILS NFR BLD AUTO: 62.3 % (ref 42.7–76)
NITRITE UR QL STRIP: NEGATIVE
NRBC BLD AUTO-RTO: 0 /100 WBC (ref 0–0.2)
PH UR STRIP.AUTO: 5.5 [PH] (ref 5–8)
PLATELET # BLD AUTO: 162 10*3/MM3 (ref 140–450)
PMV BLD AUTO: 9.2 FL (ref 6–12)
POTASSIUM SERPL-SCNC: 4.6 MMOL/L (ref 3.5–5.2)
PROT SERPL-MCNC: 7.7 G/DL (ref 6–8.5)
PROT UR QL STRIP: NEGATIVE
RBC # BLD AUTO: 3.86 10*6/MM3 (ref 4.14–5.8)
SODIUM SERPL-SCNC: 135 MMOL/L (ref 136–145)
SP GR UR STRIP: 1.01 (ref 1–1.03)
TROPONIN T SERPL HS-MCNC: 28 NG/L
UROBILINOGEN UR QL STRIP: NORMAL
WBC NRBC COR # BLD AUTO: 5.75 10*3/MM3 (ref 3.4–10.8)
WHOLE BLOOD HOLD COAG: NORMAL
WHOLE BLOOD HOLD SPECIMEN: NORMAL

## 2023-12-20 PROCEDURE — 99284 EMERGENCY DEPT VISIT MOD MDM: CPT

## 2023-12-20 PROCEDURE — 25810000003 SODIUM CHLORIDE 0.9 % SOLUTION: Performed by: EMERGENCY MEDICINE

## 2023-12-20 PROCEDURE — 70450 CT HEAD/BRAIN W/O DYE: CPT

## 2023-12-20 PROCEDURE — 81003 URINALYSIS AUTO W/O SCOPE: CPT | Performed by: EMERGENCY MEDICINE

## 2023-12-20 PROCEDURE — 93005 ELECTROCARDIOGRAM TRACING: CPT

## 2023-12-20 PROCEDURE — 83735 ASSAY OF MAGNESIUM: CPT

## 2023-12-20 PROCEDURE — 84484 ASSAY OF TROPONIN QUANT: CPT

## 2023-12-20 PROCEDURE — 80053 COMPREHEN METABOLIC PANEL: CPT

## 2023-12-20 PROCEDURE — 93005 ELECTROCARDIOGRAM TRACING: CPT | Performed by: EMERGENCY MEDICINE

## 2023-12-20 PROCEDURE — 71045 X-RAY EXAM CHEST 1 VIEW: CPT

## 2023-12-20 PROCEDURE — 85025 COMPLETE CBC W/AUTO DIFF WBC: CPT

## 2023-12-20 RX ORDER — SODIUM CHLORIDE 0.9 % (FLUSH) 0.9 %
10 SYRINGE (ML) INJECTION AS NEEDED
Status: DISCONTINUED | OUTPATIENT
Start: 2023-12-20 | End: 2023-12-20 | Stop reason: HOSPADM

## 2023-12-20 RX ADMIN — SODIUM CHLORIDE 1000 ML: 9 INJECTION, SOLUTION INTRAVENOUS at 15:46

## 2023-12-20 NOTE — ED PROVIDER NOTES
"Time: 1:16 PM EST  Date of encounter:  12/20/2023  Independent Historian/Clinical History and Information was obtained by:   Patient    History is limited by: N/A    Chief Complaint   Patient presents with    Fall    Dizziness         History of Present Illness:  Patient is a 83 y.o. year old male who presents to the emergency department for evaluation of fall morning with abrasion to the back of the head.  Patient denies any loss of consciousness after the fall and he states that he simply lost balance.  The patient does have Parkinson's disease and takes medication for the Parkinson's.  Reports intermittent dizziness over the last several weeks that worsens when he stands or moves around.  Patient reports he takes Xarelto for atrial fibrillation.  Denies any other injuries from the fall.  Denies any recent medication changes.  Denies any fevers, chills, body aches, nausea, vomiting, diarrhea.  He has had no chest pain or shortness of breath denies any dark tarry stools, dark vomit, or xavi blood in his stools. (KAYLEY Rutledge Provider in Triage)      Patient Care Team  Primary Care Provider: Martin Monahan MD    Past Medical History:     Allergies   Allergen Reactions    Loratadine Other (See Comments) and Unknown - Low Severity     PATIENT & SPOUSE SAID IF HE TAKES IT TO LONG (OR TO MANY DAYS IN A ROW) IT DRIES HIM OUT & MAKES HIS NOSE BLEED.     Past Medical History:   Diagnosis Date    Atrial fibrillation, persistent  10/14/2021    BPH (benign prostatic hyperplasia)     Disease of thyroid gland     Essential hypertension 10/14/2021    Falls frequently     SPOUSE REPORTS MULTIPLE FALLS \"EVERY WEEK. I'VE LOST COUNT.\"    Generalized weakness     History of CVA (cerebrovascular accident) 2012    RESIDUAL OF LEFT SIDE WEAKNESS    Urgency of urination     WEARS BRIEFS AT HOME     Past Surgical History:   Procedure Laterality Date    LEG SURGERY Right      History reviewed. No pertinent family " history.    Home Medications:  Prior to Admission medications    Medication Sig Start Date End Date Taking? Authorizing Provider   acetaminophen (TYLENOL) 500 MG tablet Take 1 tablet by mouth Every 6 (Six) Hours As Needed for Mild Pain.    ProviderMaribel MD   carbidopa-levodopa (SINEMET)  MG per tablet Titrate as directed and take up to 3 tablets 3 times a day at 12 noon, 4 PM and 8 PM. 11/21/23   Toro Galloway MD   carvedilol (COREG) 25 MG tablet Take 0.5 tablets by mouth 2 (Two) Times a Day. 7/9/23   Jimmie Douglas MD   chlorhexidine (PERIDEX) 0.12 % solution Apply 15 mL to the mouth or throat 2 (Two) Times a Day.    ProviderMaribel MD   Cholecalciferol 125 MCG (5000 UT) tablet Take 1 tablet by mouth Daily.    Emergency, Nurse EVELYN Marin   dilTIAZem XR (DILACOR XR) 120 MG 24 hr capsule Take 1 capsule by mouth Daily. 8/2/23   Peggy Ordonez APRN   escitalopram (LEXAPRO) 20 MG tablet Take 1 tablet by mouth Daily. 7/20/21   Emergency, Nurse EVELYN Marin   ezetimibe (ZETIA) 10 MG tablet Take 1 tablet by mouth Daily. 7/20/21   Emergency, Nurse EVELYN Marin   finasteride (PROSCAR) 5 MG tablet Take 1 tablet by mouth Daily. 3/21/23   Ilana Sanchez APRN   furosemide (LASIX) 40 MG tablet Take 1 tablet by mouth Daily. 9/26/23   Peggy Ordonez APRN   levothyroxine (SYNTHROID, LEVOTHROID) 50 MCG tablet Take 1 tablet by mouth Daily. 7/24/21   Emergency, Nurse EVELYN Marin   Loratadine 10 MG capsule Take 1 capsule by mouth Every Other Day.    ProviderMaribel MD   meclizine (ANTIVERT) 25 MG tablet Take 1 tablet by mouth 3 (Three) Times a Day As Needed for Dizziness.    Maribel Royal MD   multivitamin with minerals tablet tablet Take 1 tablet by mouth Daily.    Emergency, Nurse EVELYN Marin   potassium chloride 10 MEQ CR tablet Take 1 tablet by mouth Daily. 8/16/23   Peggy Ordonez APRN   rivaroxaban (Xarelto) 20 MG tablet Take 1 tablet by mouth Daily With Dinner. 8/2/23    "Peggy Ordonez APRN   tamsulosin (FLOMAX) 0.4 MG capsule 24 hr capsule Take 1 capsule by mouth Daily. 3/21/23   Ilana Sanchez APRN   tiotropium bromide monohydrate (SPIRIVA RESPIMAT) 2.5 MCG/ACT aerosol solution inhaler Inhale 2 puffs Daily. 10/26/23   Trace Lai MD   tiotropium bromide monohydrate (Spiriva Respimat) 2.5 MCG/ACT aerosol solution inhaler Inhale 2 puffs Daily for 1 day. 10/26/23 10/27/23  Trace Lai MD        Social History:   Social History     Tobacco Use    Smoking status: Former     Packs/day: 1     Types: Cigarettes     Start date:      Quit date: 10/14/1961     Years since quittin.2     Passive exposure: Never    Smokeless tobacco: Never   Vaping Use    Vaping Use: Never used   Substance Use Topics    Alcohol use: Never    Drug use: Never         Review of Systems:  Review of Systems   Constitutional:  Negative for chills and fever.   HENT:  Negative for congestion, ear pain and sore throat.    Eyes:  Negative for pain.   Respiratory:  Negative for cough, chest tightness and shortness of breath.    Cardiovascular:  Negative for chest pain.   Gastrointestinal:  Negative for abdominal pain, diarrhea, nausea and vomiting.   Genitourinary:  Negative for flank pain and hematuria.   Musculoskeletal:  Negative for joint swelling.   Skin:  Positive for wound. Negative for pallor.        Scalp contusion   Neurological:  Positive for dizziness. Negative for seizures and headaches.   All other systems reviewed and are negative.       Physical Exam:  /76 (BP Location: Left arm, Patient Position: Sitting)   Pulse 75   Temp 98 °F (36.7 °C) (Oral)   Resp 14   Ht 188 cm (74.02\")   Wt 91.5 kg (201 lb 11.5 oz)   SpO2 90%   BMI 25.89 kg/m²         Physical Exam  Vitals and nursing note reviewed.   Constitutional:       General: He is not in acute distress.     Appearance: Normal appearance. He is not toxic-appearing.   HENT:      Head: Normocephalic and atraumatic.     "  Comments: Abrasion to back of scalp     Mouth/Throat:      Mouth: Mucous membranes are moist.   Eyes:      General: No scleral icterus.     Extraocular Movements: Extraocular movements intact.      Conjunctiva/sclera: Conjunctivae normal.   Cardiovascular:      Rate and Rhythm: Normal rate. Rhythm irregular.      Pulses: Normal pulses.      Heart sounds: Normal heart sounds.   Pulmonary:      Effort: Pulmonary effort is normal. No respiratory distress.      Breath sounds: Normal breath sounds.   Abdominal:      General: Abdomen is flat. There is no distension.      Palpations: Abdomen is soft.      Tenderness: There is no abdominal tenderness.   Musculoskeletal:         General: Normal range of motion.      Cervical back: Normal range of motion and neck supple.   Skin:     General: Skin is warm and dry.      Capillary Refill: Capillary refill takes less than 2 seconds.      Coloration: Skin is not cyanotic.      Findings: Bruising present.      Comments: There is ecchymosis noted to the occipital area.   Neurological:      General: No focal deficit present.      Mental Status: He is alert and oriented to person, place, and time. Mental status is at baseline.   Psychiatric:         Attention and Perception: Attention and perception normal.         Mood and Affect: Mood normal.         Behavior: Behavior normal.         Thought Content: Thought content normal.         Judgment: Judgment normal.                      Procedures:  Procedures      Medical Decision Making:      Comorbidities that affect care:    Parkinson's disease    External Notes reviewed:    Previous Clinic Note: For thyroid disease.      The following orders were placed and all results were independently analyzed by me:  Orders Placed This Encounter   Procedures    XR Chest 1 View    CT Head Without Contrast    [place] Draw    Comprehensive Metabolic Panel    Single High Sensitivity Troponin T    Magnesium    Urinalysis With Microscopic If Indicated  (No Culture) - Urine, Clean Catch    CBC Auto Differential    NPO Diet NPO Type: Strict NPO    Undress & Gown    Continuous Pulse Oximetry    Vital Signs    Orthostatic Blood Pressure    Orthostatic Vitals    Oxygen Therapy- Nasal Cannula; Titrate 1-6 LPM Per SpO2; 90 - 95%    POC Glucose Once    ECG 12 Lead ED Triage Standing Order; Weak / Dizzy / AMS    Insert Peripheral IV    Fall Precautions    CBC & Differential    Green Top (Gel)    Lavender Top    Gold Top - SST    Light Blue Top       Medications Given in the Emergency Department:  Medications   sodium chloride 0.9 % flush 10 mL (has no administration in time range)   sodium chloride 0.9 % bolus 1,000 mL (1,000 mL Intravenous New Bag 12/20/23 1546)        ED Course:    The patient was initially evaluated in the triage area where orders were placed. The patient was later dispositioned by Ezequiel De Dios DO.      The patient was advised to stay for completion of workup which includes but is not limited to communication of labs and radiological results, reassessment and plan. The patient was advised that leaving prior to disposition by a provider could result in critical findings that are not communicated to the patient.     ED Course as of 12/20/23 1550   Wed Dec 20, 2023   1313 HS Troponin T(!): 28 [CE]   1319   --- PROVIDER IN TRIAGE NOTE ---    The patient was evaluated by hCen helton in triage. Orders were placed and the patient is currently awaiting disposition.      [CE]      ED Course User Index  [CE] Chen Ruelas, KAYLEY       EKG: Atrial fibrillation with a rate of 75 bpm  Abnormal P wave and SD interval  No acute ischemic changes.      Labs:    Lab Results (last 24 hours)       Procedure Component Value Units Date/Time    CBC & Differential [[id number]]  (Abnormal) Collected: 12/20/23 1229    Specimen: Blood Updated: 12/20/23 1238    Narrative:      The following orders were created for panel order CBC & Differential.  Procedure                                Abnormality         Status                     ---------                               -----------         ------                     CBC Auto Differential[[id number]]        Abnormal            Final result                 Please view results for these tests on the individual orders.    Comprehensive Metabolic Panel [[id number]]  (Abnormal) Collected: 12/20/23 1229    Specimen: Blood Updated: 12/20/23 1308     Glucose 110 mg/dL      BUN 24 mg/dL      Creatinine 1.23 mg/dL      Sodium 135 mmol/L      Potassium 4.6 mmol/L      Chloride 98 mmol/L      CO2 27.5 mmol/L      Calcium 9.9 mg/dL      Total Protein 7.7 g/dL      Albumin 4.4 g/dL      ALT (SGPT) 6 U/L      AST (SGOT) 20 U/L      Alkaline Phosphatase 112 U/L      Total Bilirubin 0.8 mg/dL      Globulin 3.3 gm/dL      A/G Ratio 1.3 g/dL      BUN/Creatinine Ratio 19.5     Anion Gap 9.5 mmol/L      eGFR 58.6 mL/min/1.73     Narrative:      GFR Normal >60  Chronic Kidney Disease <60  Kidney Failure <15    The GFR formula is only valid for adults with stable renal function between ages 18 and 70.    Single High Sensitivity Troponin T [[id number]]  (Abnormal) Collected: 12/20/23 1229    Specimen: Blood Updated: 12/20/23 1303     HS Troponin T 28 ng/L     Narrative:      High Sensitive Troponin T Reference Range:  <14.0 ng/L- Negative Female for AMI  <22.0 ng/L- Negative Male for AMI  >=14 - Abnormal Female indicating possible myocardial injury.  >=22 - Abnormal Male indicating possible myocardial injury.   Clinicians would have to utilize clinical acumen, EKG, Troponin, and serial changes to determine if it is an Acute Myocardial Infarction or myocardial injury due to an underlying chronic condition.         Magnesium [[id number]]  (Normal) Collected: 12/20/23 1229    Specimen: Blood Updated: 12/20/23 1304     Magnesium 2.1 mg/dL     CBC Auto Differential [[id number]]  (Abnormal) Collected: 12/20/23 1229    Specimen: Blood Updated: 12/20/23 1238     WBC  5.75 10*3/mm3      RBC 3.86 10*6/mm3      Hemoglobin 12.7 g/dL      Hematocrit 39.5 %      .3 fL      MCH 32.9 pg      MCHC 32.2 g/dL      RDW 13.8 %      RDW-SD 52.0 fl      MPV 9.2 fL      Platelets 162 10*3/mm3      Neutrophil % 62.3 %      Lymphocyte % 26.6 %      Monocyte % 7.5 %      Eosinophil % 3.0 %      Basophil % 0.3 %      Immature Grans % 0.3 %      Neutrophils, Absolute 3.58 10*3/mm3      Lymphocytes, Absolute 1.53 10*3/mm3      Monocytes, Absolute 0.43 10*3/mm3      Eosinophils, Absolute 0.17 10*3/mm3      Basophils, Absolute 0.02 10*3/mm3      Immature Grans, Absolute 0.02 10*3/mm3      nRBC 0.0 /100 WBC     Urinalysis With Microscopic If Indicated (No Culture) - Urine, Clean Catch [[id number]]  (Normal) Collected: 12/20/23 1343    Specimen: Urine, Clean Catch Updated: 12/20/23 1359     Color, UA Yellow     Appearance, UA Clear     pH, UA 5.5     Specific Gravity, UA 1.013     Glucose, UA Negative     Ketones, UA Negative     Bilirubin, UA Negative     Blood, UA Negative     Protein, UA Negative     Leuk Esterase, UA Negative     Nitrite, UA Negative     Urobilinogen, UA 0.2 E.U./dL    Narrative:      Urine microscopic not indicated.             Imaging:    CT Head Without Contrast    Result Date: 12/20/2023  PROCEDURE: CT HEAD WO CONTRAST  COMPARISON:  [facility], CT, CT HEAD WO CONTRAST, 4/10/2023, 16:39. INDICATIONS: fall with head trauma (posterior)  PROTOCOL:   Standard imaging protocol performed    RADIATION:   DLP: 1082.2mGy*cm   MA and/or KV was adjusted to minimize radiation dose.     TECHNIQUE: After obtaining the patient's consent, CT images were obtained without non-ionic intravenous contrast material.  FINDINGS:  There is some ballooning of the right lateral ventricle which could relate to old insult.  There is some encephalomalacia around the sylvian fissure area and changes of an old insult in the right parietal area.  There are periventricular and deep white matter  changes which could reflect more chronic small vessel ischemic change.  There is probably chronic small vessel ischemic change to the basal ganglia.  There is some cerebral and cerebellar atrophy.  The mastoids seem clear.  There is a small fluid level in the sphenoid sinus which could reflect some sinusitis or may be posttraumatic given the clinical history.        1. Changes from old insult right cerebral hemisphere. 2. There are white matter changes involving both cerebral hemispheres as well as hypodense areas in the basal ganglia that could reflect sequela to small vessel ischemic change. 3. There is some global atrophy. 4. There is a fluid level within the sphenoid sinus that may reflect acute sinusitis or sequela to trauma.     YENIFER SANCHEZ MD       Electronically Signed and Approved By: YENIFER SANCHEZ MD on 12/20/2023 at 14:25             XR Chest 1 View    Result Date: 12/20/2023  PROCEDURE: XR CHEST 1 VW  COMPARISON: [facility], CR, XR CHEST 2 VW, 8/24/2023, 11:49.  [facility], CR, XR CHEST PA AND LATERAL, 10/20/2023, 16:55.  INDICATIONS: Weak/Dizzy/AMS triage protocol  FINDINGS:  The heart looks enlarged.  There is some haziness in the basilar areas with lack of good definition to the diaphragms.  Some bibasilar atelectasis or underlying effusion cannot be entirely excluded.        1. Cardiomegaly 2. Lack of good definition margin left and right hemidiaphragm that might relate to some bibasilar atelectasis or possibly underlying effusions.       YENIFER SANCHEZ MD       Electronically Signed and Approved By: YENIFER SANCHEZ MD on 12/20/2023 at 13:03                Differential Diagnosis and Discussion:      Weakness: Based on the patient's history, signs, and symptoms, the diffential diagnosis includes but is not limited to meningitis, stroke, sepsis, subarachnoid hemorrhage, intracranial bleeding, encephalitis, acute uti, dehydration, MS, myasthenia gravis, Guillan  [place], migraine variant, neuromuscular disorders vertigo, electrolyte imbalance, and metabolic disorders.    All labs were reviewed and interpreted by me.  EKG was interpreted by me.    MDM     Amount and/or Complexity of Data Reviewed  Clinical lab tests: reviewed  Tests in the radiology section of CPT®: reviewed  Tests in the medicine section of CPT®: reviewed  Decide to obtain previous medical records or to obtain history from someone other than the patient: yes                 Patient Care Considerations:    CT CHEST: I considered ordering a CT scan of the chest, however the patient had no chest trauma.      Consultants/Shared Management Plan:    None    Social Determinants of Health:    Patient has presented with family members who are responsible, reliable and will ensure follow up care.      Disposition and Care Coordination:    Discharged: I considered escalation of care by admitting this patient to the hospital, however the patient has improved and is suitable and stable for discharge.    I have explained discharge medications and the need for follow up with the patient/caretakers. This was also printed in the discharge instructions. Patient was discharged with the following medications and follow up:      Medication List      No changes were made to your prescriptions during this visit.      Martin Monahan MD  [address] [place] [address]  [phone]    In 1 day         Final diagnoses:   Hypotension, unspecified hypotension type   Contusion of scalp, initial encounter        ED Disposition       ED Disposition   Discharge    Condition   Stable    Comment   --               This medical record created using voice recognition software.             Ezequiel De Dios,   12/20/23 [id number]

## 2023-12-20 NOTE — DISCHARGE INSTRUCTIONS
Drink plenty of fluids.  Do not take your Lasix tomorrow.  Return for worsening symptoms.  Follow-up with Dr. Monahan tomorrow.

## 2023-12-23 LAB
QT INTERVAL: 418 MS
QTC INTERVAL: 467 MS

## 2023-12-28 ENCOUNTER — OFFICE VISIT (OUTPATIENT)
Dept: CARDIOLOGY | Facility: CLINIC | Age: 83
End: 2023-12-28
Payer: MEDICARE

## 2023-12-28 VITALS
DIASTOLIC BLOOD PRESSURE: 82 MMHG | BODY MASS INDEX: 26.69 KG/M2 | SYSTOLIC BLOOD PRESSURE: 128 MMHG | HEART RATE: 93 BPM | WEIGHT: 208 LBS | HEIGHT: 74 IN

## 2023-12-28 DIAGNOSIS — I48.19 ATRIAL FIBRILLATION, PERSISTENT: ICD-10-CM

## 2023-12-28 DIAGNOSIS — I10 PRIMARY HYPERTENSION: ICD-10-CM

## 2023-12-28 DIAGNOSIS — I50.32 CHRONIC HEART FAILURE WITH PRESERVED EJECTION FRACTION (HFPEF): Primary | ICD-10-CM

## 2023-12-28 NOTE — ASSESSMENT & PLAN NOTE
Heart rate controlled continue with diltiazem 120 and Coreg 12.5 twice daily.  Patient is on Xarelto 20 mg daily for CVA prevention of falls become more of an issue may have to discontinue this

## 2023-12-28 NOTE — ASSESSMENT & PLAN NOTE
Blood pressure currently controlled has been trending at times in the lower side made it come back on his Coreg in the future if orthostasis becomes an issue with his Parkinson's

## 2023-12-28 NOTE — PROGRESS NOTES
"Chief Complaint  Falling (Last week - Went to ER)    Subjective    Patient has been doing stably from a heart standpoint no problems with increased weight gain edema or shortness of breath issues.  His blood pressure has been trending on the lower side recently he did have a fall felt to be secondary to balance issues from his Parkinson's disease  Past Medical History:   Diagnosis Date    Atrial fibrillation, persistent  10/14/2021    BPH (benign prostatic hyperplasia)     Disease of thyroid gland     Essential hypertension 10/14/2021    Falls frequently     SPOUSE REPORTS MULTIPLE FALLS \"EVERY WEEK. I'VE LOST COUNT.\"    Generalized weakness     History of CVA (cerebrovascular accident) 2012    RESIDUAL OF LEFT SIDE WEAKNESS    Urgency of urination     WEARS BRIEFS AT HOME         Current Outpatient Medications:     acetaminophen (TYLENOL) 500 MG tablet, Take 1 tablet by mouth Every 6 (Six) Hours As Needed for Mild Pain., Disp: , Rfl:     carbidopa-levodopa (SINEMET)  MG per tablet, Titrate as directed and take up to 3 tablets 3 times a day at 12 noon, 4 PM and 8 PM., Disp: 270 tablet, Rfl: 5    carvedilol (COREG) 25 MG tablet, Take 0.5 tablets by mouth 2 (Two) Times a Day., Disp: 180 tablet, Rfl: 3    dilTIAZem XR (DILACOR XR) 120 MG 24 hr capsule, Take 1 capsule by mouth Daily., Disp: 90 capsule, Rfl: 3    escitalopram (LEXAPRO) 20 MG tablet, Take 1 tablet by mouth Daily., Disp: , Rfl:     ezetimibe (ZETIA) 10 MG tablet, Take 1 tablet by mouth Daily., Disp: , Rfl:     finasteride (PROSCAR) 5 MG tablet, Take 1 tablet by mouth Daily., Disp: 90 tablet, Rfl: 4    furosemide (LASIX) 40 MG tablet, Take 1 tablet by mouth Daily., Disp: 90 tablet, Rfl: 1    levothyroxine (SYNTHROID, LEVOTHROID) 50 MCG tablet, Take 1 tablet by mouth Daily., Disp: , Rfl:     Loratadine 10 MG capsule, Take 1 capsule by mouth Every Other Day., Disp: , Rfl:     meclizine (ANTIVERT) 25 MG tablet, Take 1 tablet by mouth 3 (Three) Times a " "Day As Needed for Dizziness., Disp: , Rfl:     multivitamin with minerals tablet tablet, Take 1 tablet by mouth Daily., Disp: , Rfl:     potassium chloride 10 MEQ CR tablet, Take 1 tablet by mouth Daily., Disp: 90 tablet, Rfl: 3    rivaroxaban (Xarelto) 20 MG tablet, Take 1 tablet by mouth Daily With Dinner., Disp: 90 tablet, Rfl: 3    tamsulosin (FLOMAX) 0.4 MG capsule 24 hr capsule, Take 1 capsule by mouth Daily., Disp: 90 capsule, Rfl: 4    tiotropium bromide monohydrate (SPIRIVA RESPIMAT) 2.5 MCG/ACT aerosol solution inhaler, Inhale 2 puffs Daily., Disp: 1 each, Rfl: 5    tiotropium bromide monohydrate (Spiriva Respimat) 2.5 MCG/ACT aerosol solution inhaler, Inhale 2 puffs Daily for 1 day., Disp: 2 each, Rfl: 0    Medications Discontinued During This Encounter   Medication Reason    chlorhexidine (PERIDEX) 0.12 % solution Discontinued by another clinician    Cholecalciferol 125 MCG (5000 UT) tablet Discontinued by another clinician     Allergies   Allergen Reactions    Loratadine Other (See Comments) and Unknown - Low Severity     PATIENT & SPOUSE SAID IF HE TAKES IT TO LONG (OR TO MANY DAYS IN A ROW) IT DRIES HIM OUT & MAKES HIS NOSE BLEED.        Social History     Tobacco Use    Smoking status: Former     Packs/day: 1     Types: Cigarettes     Start date:      Quit date: 10/14/1961     Years since quittin.2     Passive exposure: Never    Smokeless tobacco: Never   Vaping Use    Vaping Use: Never used   Substance Use Topics    Alcohol use: Never    Drug use: Never       History reviewed. No pertinent family history.     Objective     /82   Pulse 93   Ht 188 cm (74\")   Wt 94.3 kg (208 lb)   BMI 26.71 kg/m²       Physical Exam    General Appearance:   no acute distress  Alert and oriented x3  HENT:   lips not cyanotic  Atraumatic  Neck:  No jvd   supple  Respiratory:  no respiratory distress  normal breath sounds  no rales  Cardiovascular:  Irregular irregular  no S3, no S4   no murmur  no " "rub  Extremities  No cyanosis  lower extremity edema: Trace  Skin:   warm, dry  No rashes      Result Review :     proBNP   Date Value Ref Range Status   09/25/2023 840.0 0.0 - 1,800.0 pg/mL Final     CMP          8/23/2023    09:30 9/25/2023    12:57 12/20/2023    12:29   CMP   Glucose 99  89  110    BUN 26  26  24    Creatinine 1.18  1.47  1.23    EGFR 61.6  47.3  58.6    Sodium 141  139  135    Potassium 4.3  4.9  4.6    Chloride 105  96  98    Calcium 9.9  10.4  9.9    Total Protein   7.7    Albumin   4.4    Globulin   3.3    Total Bilirubin   0.8    Alkaline Phosphatase   112    AST (SGOT)   20    ALT (SGPT)   6    Albumin/Globulin Ratio   1.3    BUN/Creatinine Ratio 22.0  17.7  19.5    Anion Gap 10.9  13.3  9.5      CBC w/diff          7/8/2023    07:54 7/9/2023    05:21 12/20/2023    12:29   CBC w/Diff   WBC 5.84  5.95  5.75    RBC 2.90  3.09  3.86    Hemoglobin 10.1  10.6  12.7    Hematocrit 29.9  32.1  39.5    .1  103.9  102.3    MCH 34.8  34.3  32.9    MCHC 33.8  33.0  32.2    RDW 13.6  13.5  13.8    Platelets 165  174  162    Neutrophil Rel % 60.6   62.3    Immature Granulocyte Rel % 0.3   0.3    Lymphocyte Rel % 23.1   26.6    Monocyte Rel % 9.9   7.5    Eosinophil Rel % 5.8   3.0    Basophil Rel % 0.3   0.3       Lab Results   Component Value Date    TSH 1.830 12/29/2022      No results found for: \"FREET4\"   No results found for: \"DDIMERQUANT\"  Magnesium   Date Value Ref Range Status   12/20/2023 2.1 1.6 - 2.4 mg/dL Final      No results found for: \"DIGOXIN\"   Lab Results   Component Value Date    TROPONINT 28 (H) 12/20/2023             No results found for: \"POCTROP\"    Results for orders placed in visit on 09/22/23    Adult Transthoracic Echo Complete W/ Cont if Necessary Per Protocol    Interpretation Summary    Left ventricular systolic function is normal. Calculated left ventricular EF = 55.3%    Left ventricular diastolic function was indeterminate.    The right ventricular cavity is " borderline dilated.    The left atrial cavity is moderately dilated.    The right atrial cavity is moderately  dilated.    There is calcification of the aortic valve.    Moderate tricuspid valve regurgitation is present.    Estimated right ventricular systolic pressure from tricuspid regurgitation is mildly elevated (35-45 mmHg).                 Diagnoses and all orders for this visit:    1. Chronic heart failure with preserved ejection fraction (HFpEF) (Primary)  Assessment & Plan:  Feel patient stable from a volume standpoint continue with his Lasix at 40 mg once a day and potassium 10 mill equivalents.      2. Atrial fibrillation, persistent   Assessment & Plan:  Heart rate controlled continue with diltiazem 120 and Coreg 12.5 twice daily.  Patient is on Xarelto 20 mg daily for CVA prevention of falls become more of an issue may have to discontinue this      3. Primary hypertension  Assessment & Plan:  Blood pressure currently controlled has been trending at times in the lower side made it come back on his Coreg in the future if orthostasis becomes an issue with his Parkinson's              Follow Up     Return in about 6 months (around 6/28/2024) for Follow with Peggy Ordonez, EKG with F/U.          Patient was given instructions and counseling regarding his condition or for health maintenance advice. Please see specific information pulled into the AVS if appropriate.

## 2023-12-28 NOTE — ASSESSMENT & PLAN NOTE
Feel patient stable from a volume standpoint continue with his Lasix at 40 mg once a day and potassium 10 mill equivalents.

## 2024-01-17 ENCOUNTER — TELEPHONE (OUTPATIENT)
Dept: CARDIOLOGY | Facility: CLINIC | Age: 84
End: 2024-01-17
Payer: MEDICARE

## 2024-01-17 NOTE — TELEPHONE ENCOUNTER
The [facility] received a fax that requires your attention. The document has been indexed to the patient’s chart for your review.      Reason for sending: EXTERNAL MEDICAL RECORD NOTIFICATION     Documents Description: [id number]    Name of Sender: [facility]     Date Indexed: 1.16.24

## 2024-02-20 ENCOUNTER — OFFICE VISIT (OUTPATIENT)
Dept: NEUROLOGY | Facility: CLINIC | Age: 84
End: 2024-02-20
Payer: MEDICARE

## 2024-02-20 VITALS
BODY MASS INDEX: 26.44 KG/M2 | HEIGHT: 74 IN | WEIGHT: 206 LBS | SYSTOLIC BLOOD PRESSURE: 114 MMHG | HEART RATE: 82 BPM | DIASTOLIC BLOOD PRESSURE: 64 MMHG

## 2024-02-20 DIAGNOSIS — G47.52 RBD (REM BEHAVIORAL DISORDER): ICD-10-CM

## 2024-02-20 DIAGNOSIS — R29.6 RECURRENT FALLS: ICD-10-CM

## 2024-02-20 DIAGNOSIS — G47.61 PERIODIC LIMB MOVEMENT DISORDER (PLMD): ICD-10-CM

## 2024-02-20 DIAGNOSIS — G20.A1 PARKINSON'S DISEASE WITHOUT DYSKINESIA OR FLUCTUATING MANIFESTATIONS: Primary | ICD-10-CM

## 2024-02-20 PROCEDURE — 3074F SYST BP LT 130 MM HG: CPT | Performed by: PSYCHIATRY & NEUROLOGY

## 2024-02-20 PROCEDURE — 3078F DIAST BP <80 MM HG: CPT | Performed by: PSYCHIATRY & NEUROLOGY

## 2024-02-20 PROCEDURE — 99214 OFFICE O/P EST MOD 30 MIN: CPT | Performed by: PSYCHIATRY & NEUROLOGY

## 2024-02-20 PROCEDURE — 1159F MED LIST DOCD IN RCRD: CPT | Performed by: PSYCHIATRY & NEUROLOGY

## 2024-02-20 PROCEDURE — 1160F RVW MEDS BY RX/DR IN RCRD: CPT | Performed by: PSYCHIATRY & NEUROLOGY

## 2024-02-20 NOTE — ASSESSMENT & PLAN NOTE
I will refer him to physical therapy for big and loud program.  He is to continue taking carbidopa/levodopa 25/100 3 tablets 3 times a day but do not take it with food.  She could take it like after he eats meals and take it at 9 AM, 1 PM and 5 PM.  I will see him again in 4 months time for follow-up.  Thank you for letting me participate in his care

## 2024-02-20 NOTE — ASSESSMENT & PLAN NOTE
I will refer him back to sleep medicine to see if he needs further studies or he could be treated with clonazepam for the parotic limb movements of sleep and REM behavior disorder.

## 2024-02-20 NOTE — PROGRESS NOTES
"Chief Complaint  Follow-up (Med check)    Subjective          Justyn Galo is a 83 y.o. male who presents to [facility]  History of Present Illness  83-year-old man here for follow-up of his Parkinson's syndrome disease.  He states that he has fallen down backwards once.  He usually falls because he shuffles his feet and he cannot catch up with the shuffling of the feet.  He can all with and without the walker.  He is taking carbidopa/levodopa 25/100 tablets 3 times a day which may be helping or not helping him.  He is taking it at 8 AM, 12 noon and 4 PM.  A 12 noon dose with food makes him sick.  His wife states that he has dementia and had physical therapy in the past not for Parkinson's but for other problems and had problems with comprehending instructions.    His wife also states that he has conversations at night that he jerks his body all night.  There is times he moves around.  Said sleep medicine seen for sleep apnea syndrome.    Objective   Vital Signs:   /64 (BP Location: Left arm, Patient Position: Sitting, Cuff Size: Adult)   Pulse 82   Ht 188 cm (74.02\")   Wt 93.4 kg (206 lb)   BMI 26.44 kg/m²     Physical Exam   He is alert, fluent, telling his wife me that he has had sleep evaluation in the past.  He saw Dr. Lai according to the chart.  There is no tremor noted.  He is able to ambulate using a rollator walker without difficulty and turning is safe.        Assessment and Plan  Diagnoses and all orders for this visit:    1. Parkinson's disease without dyskinesia or fluctuating manifestations (Primary)  Assessment & Plan:  I will refer him to physical therapy for big and loud program.  He is to continue taking carbidopa/levodopa 25/100 3 tablets 3 times a day but do not take it with food.  She could take it like after he eats meals and take it at 9 AM, 1 PM and 5 PM.  I will see him again in 4 months time for follow-up.  Thank you for letting " me participate in his care    Orders:  -     Ambulatory Referral to Physical Therapy Neuro    2. Periodic limb movement disorder (PLMD)  -     Ambulatory Referral to Sleep Medicine    3. RBD (REM behavioral disorder)  Assessment & Plan:  I will refer him back to sleep medicine to see if he needs further studies or he could be treated with clonazepam for the parotic limb movements of sleep and REM behavior disorder.    Orders:  -     Ambulatory Referral to Sleep Medicine    4. Recurrent falls  -     Ambulatory Referral to Physical Therapy Neuro         Total time spent with the patient and coordinating patient care was 35 minutes.    Follow Up  No follow-ups on file.  Patient was given instructions and counseling regarding his condition or for health maintenance advice. Please see specific information pulled into the AVS if appropriate.

## 2024-03-07 ENCOUNTER — OFFICE VISIT (OUTPATIENT)
Dept: SLEEP MEDICINE | Facility: HOSPITAL | Age: 84
End: 2024-03-07
Payer: MEDICARE

## 2024-03-07 ENCOUNTER — TELEPHONE (OUTPATIENT)
Dept: SLEEP MEDICINE | Facility: HOSPITAL | Age: 84
End: 2024-03-07

## 2024-03-07 VITALS — HEART RATE: 84 BPM | OXYGEN SATURATION: 97 % | WEIGHT: 209.9 LBS | HEIGHT: 74 IN | BODY MASS INDEX: 26.94 KG/M2

## 2024-03-07 DIAGNOSIS — G47.52 RBD (REM BEHAVIORAL DISORDER): ICD-10-CM

## 2024-03-07 DIAGNOSIS — G47.34 SLEEP RELATED HYPOXIA: ICD-10-CM

## 2024-03-07 DIAGNOSIS — G47.33 SEVERE OBSTRUCTIVE SLEEP APNEA: Primary | ICD-10-CM

## 2024-03-07 DIAGNOSIS — G47.54 PARASOMNIA IN CONDITIONS CLASSIFIED ELSEWHERE: ICD-10-CM

## 2024-03-07 PROCEDURE — G0463 HOSPITAL OUTPT CLINIC VISIT: HCPCS

## 2024-03-07 NOTE — PROGRESS NOTES
Follow Up [facility] Disorders [facility] Note     Chief Complaint:  SHELLEY     Primary Care Physician: Martin Monahan MD    Interval History:   The patient is a 83 y.o. male  who was last seen in the sleep lab: 2022 for PSG.  PSG showed AHI 41.9 lowest SpO2 72%.  When I first met patient he had concerns for narcolepsy diagnosis; in the past had difficulty tolerating PAP.  Advised considering restarting CPAP or considering ENT referral to consider inspire therapy.  No follow-up since then.I reviewed notes from neurology from last month where they discussed patient's Parkinson's disease.  Likely movement disorder and concern for REM behavior disorder.  Patient was referred back to see if he needed further studies or if he could be treated with clonazepam for periodic limb movements of sleep and REM behavior disorder.  Today patient reports that since I last saw him pulmonary had started him on 2 L of supplemental oxygen.  He wears this at night only.  He has significant concerns about trying Pap again due to intolerance in the past.  He is also wary about considering inspire therapy at this time.    Review of Systems:    A complete review of systems was done and all were negative with the exception of see scanned media    Social History:    Social History     Socioeconomic History    Marital status:    Tobacco Use    Smoking status: Former     Current packs/day: 0.00     Average packs/day: 1 pack/day for 8.8 years (8.8 ttl pk-yrs)     Types: Cigarettes     Start date:      Quit date: 10/14/1961     Years since quittin.4     Passive exposure: Never    Smokeless tobacco: Never   Vaping Use    Vaping status: Never Used   Substance and Sexual Activity    Alcohol use: Never    Drug use: Never    Sexual activity: Defer       Allergies:  Loratadine     Medication Review:  Reviewed.      Vital Signs:    Vitals:    24 0900   Pulse: 84   SpO2: 97%   Weight: 95.2 kg (209 lb 14.4 oz)   Height: 188 cm  "(74.02\")     Body mass index is 26.94 kg/m².    Physical Exam:    Constitutional:  Well developed 83 y.o. male that appears in no apparent distress.  Awake & oriented times 3.  Normal mood with normal recent and remote memory and normal judgement.  Eyes:  Conjunctivae normal.  Oropharynx: Previously, moist mucous membranes.    Self-administered [place] Sleepiness Scale test results: See scanned media  0-5 Lower normal daytime sleepiness  6-10 Higher normal daytime sleepiness  11-12 Mild, 13-15 Moderate, & 16-24 Severe excessive daytime sleepiness    Impression:   1. Severe obstructive sleep apnea    2. 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 RBD (REM behavioral disorder)    4. Sleep related hypoxia        Severe obstructive sleep apnea in the setting of Parkinson's disease, congestive heart failure, persistent atrial fibrillation and atelectasis.  Follow-up nocturnal oximetry.  Patient asked if supplemental oxygen was not enough if he could just increase the oxygen; advised this would likely not be the best long-term solution and regardless should be discussed with his pulmonary physician.  May still need to consider retrying PAP versus inspire therapy.    Advised to start melatonin to help with REM behavior disorder concern and sleep talking during sleep.  Struck the 5 or 10 mg nightly can increase to max of 30 mg.  Clonazepam is not recommended due to his age; higher risk of falls delusions deliriums and orthostatic hypotension.    Plan:  Good sleep hygiene measures should be maintained.     Caution during activities that require prolonged concentration is strongly advised if sleepiness returns. Changing of PAP supplies regularly is important for effective use. Patient needs to change cushion on the mask or plugs on nasal pillows along with disposable filters once every month and change mask frame, tubing, headgear and Velcro straps every 6 months at the minimum.    I answered all of the patient's " questions.  The patient will call for any problems.      Abigail Savage MD  Sleep Medicine  03/07/24  12:45 EST

## 2024-03-07 NOTE — TELEPHONE ENCOUNTER
Gave patient a call, spoke with his wife Soledad in [facility] to visit today. Per Dr. Savage, she has ordered for patient to have overnight pulse oximetry test order has been sent over to [facility]. She instructs patient to take Melatonin to help with sleep starting dose of 5mg, but to not exceed 30mg nightly. Also, she does not recommend patient starting on Klonpin due to high risk factors. I explained all this to patients wife Soledad and she verbalized understanding.

## 2024-03-22 ENCOUNTER — OFFICE VISIT (OUTPATIENT)
Dept: UROLOGY | Facility: CLINIC | Age: 84
End: 2024-03-22
Payer: MEDICARE

## 2024-03-22 VITALS
WEIGHT: 207 LBS | BODY MASS INDEX: 26.56 KG/M2 | HEIGHT: 74 IN | SYSTOLIC BLOOD PRESSURE: 110 MMHG | HEART RATE: 73 BPM | DIASTOLIC BLOOD PRESSURE: 75 MMHG | RESPIRATION RATE: 14 BRPM

## 2024-03-22 DIAGNOSIS — N40.1 BENIGN PROSTATIC HYPERPLASIA WITH WEAK URINARY STREAM: ICD-10-CM

## 2024-03-22 DIAGNOSIS — R39.12 BENIGN PROSTATIC HYPERPLASIA WITH WEAK URINARY STREAM: ICD-10-CM

## 2024-03-22 DIAGNOSIS — R31.0 GROSS HEMATURIA: Primary | ICD-10-CM

## 2024-03-22 PROBLEM — K21.9 ACID REFLUX: Status: ACTIVE | Noted: 2024-03-22

## 2024-03-22 PROBLEM — R13.12 OROPHARYNGEAL DYSPHAGIA: Status: ACTIVE | Noted: 2024-03-22

## 2024-03-22 LAB
BACTERIA UR QL AUTO: NORMAL /HPF
BILIRUB BLD-MCNC: NEGATIVE MG/DL
BILIRUB UR QL STRIP: NEGATIVE
CLARITY UR: CLEAR
CLARITY, POC: CLEAR
COLOR UR: YELLOW
COLOR UR: YELLOW
EXPIRATION DATE: NORMAL
GLUCOSE UR STRIP-MCNC: NEGATIVE MG/DL
GLUCOSE UR STRIP-MCNC: NEGATIVE MG/DL
HGB UR QL STRIP.AUTO: NEGATIVE
HYALINE CASTS UR QL AUTO: NORMAL /LPF
KETONES UR QL STRIP: NEGATIVE
KETONES UR QL: NEGATIVE
LEUKOCYTE EST, POC: NEGATIVE
LEUKOCYTE ESTERASE UR QL STRIP.AUTO: NEGATIVE
Lab: NORMAL
NITRITE UR QL STRIP: NEGATIVE
NITRITE UR-MCNC: NEGATIVE MG/ML
PH UR STRIP.AUTO: 6 [PH] (ref 5–8)
PH UR: 6 [PH] (ref 5–8)
PROT UR QL STRIP: NEGATIVE
PROT UR STRIP-MCNC: NEGATIVE MG/DL
RBC # UR STRIP: NEGATIVE /UL
RBC # UR STRIP: NORMAL /HPF
REF LAB TEST METHOD: NORMAL
SP GR UR STRIP: 1.02 (ref 1–1.03)
SP GR UR: 1.02 (ref 1–1.03)
SQUAMOUS #/AREA URNS HPF: NORMAL /HPF
UROBILINOGEN UR QL STRIP: NORMAL
UROBILINOGEN UR QL: NORMAL
WBC # UR STRIP: NORMAL /HPF

## 2024-03-22 PROCEDURE — 81001 URINALYSIS AUTO W/SCOPE: CPT | Performed by: NURSE PRACTITIONER

## 2024-03-22 RX ORDER — TAMSULOSIN HYDROCHLORIDE 0.4 MG/1
0.4 CAPSULE ORAL DAILY
Qty: 90 CAPSULE | Refills: 4 | Status: SHIPPED | OUTPATIENT
Start: 2024-03-22

## 2024-03-22 RX ORDER — FINASTERIDE 5 MG/1
5 TABLET, FILM COATED ORAL DAILY
Qty: 90 TABLET | Refills: 4 | Status: SHIPPED | OUTPATIENT
Start: 2024-03-22

## 2024-03-22 NOTE — PROGRESS NOTES
Chief Complaint: Follow-up (Pt here for follow up.  Pt says he has not seen any blood in urine.  Pt gets 2-3 times at night.  )    Subjective         History of Present Illness  Justyn Galo is a 83 y.o. male presents to [facility] to be seen for annual f/u.    He has remained on tamsulosin and finasteride.    Nocturia x 2-3 he is using supplemental oxygen as well which may be helping.    He reports no blood int he urine in the last year.     His wife notes she did see blood in his depend when she changed it but does not know where it came from.    No blood in urine at this time.    He is on furosemide as well due to significant issues with edema.    Previous:     Since we last saw patient he has had a completed upper and lower urinary tract evaluation with a CT scan only revealing tiny nonobstructing stone in the upper left kidney.  Cystoscopy revealed no tumors lesions stones or other abnormalities.     He was recommended to continue tamsulosin and finasteride and follow-up in 1 year with a UA with micro.     Nocturia x 4-5 per wife but she reports he is sleeping 12-13 hours at a time.     He does have sleep apnea and is awaiting a cpap.      States hesitancy is better.      He states his stream is improved.      U/a WNL at this time.     Patients wife states that she has seen blood on the front of his pants and has seen blood in his incontinence brief a few times.      His wife states that he has the urge to void often and cannot and he gets up a lot at night to pee.     He does have hesitancy, slow stream, intermittent and weak stream.      He is on tamsulosin 0.4 mg q day and finasteride.  Wife states he cannot tolerate 2 tamsulosin as it causes severe lethargy.     His urinalysis does reveal large amount of starch however when questioning patient and patient's spouse they do endorse that the patient uses powder to his groin area.     Urinalysis is unremarkable for microscopic  "hematuria.     He has no HX of renal stones.     Patient has recurrent falls as well .     No known family HX of  malignancies.       Objective     Past Medical History:   Diagnosis Date    Atrial fibrillation, persistent  10/14/2021    BPH (benign prostatic hyperplasia)     Disease of thyroid gland     Essential hypertension 10/14/2021    Falls frequently     SPOUSE REPORTS MULTIPLE FALLS \"EVERY WEEK. I'VE LOST COUNT.\"    Generalized weakness     History of CVA (cerebrovascular accident) 2012    RESIDUAL OF LEFT SIDE WEAKNESS    Parkinson disease 11/2023    Urgency of urination     WEARS BRIEFS AT HOME       Past Surgical History:   Procedure Laterality Date    LEG SURGERY Right          Current Outpatient Medications:     acetaminophen (TYLENOL) 500 MG tablet, Take 1 tablet by mouth Every 6 (Six) Hours As Needed for Mild Pain., Disp: , Rfl:     carbidopa-levodopa (SINEMET)  MG per tablet, Titrate as directed and take up to 3 tablets 3 times a day at 12 noon, 4 PM and 8 PM., Disp: 270 tablet, Rfl: 5    carvedilol (COREG) 25 MG tablet, Take 0.5 tablets by mouth 2 (Two) Times a Day., Disp: 180 tablet, Rfl: 3    dilTIAZem XR (DILACOR XR) 120 MG 24 hr capsule, Take 1 capsule by mouth Daily., Disp: 90 capsule, Rfl: 3    escitalopram (LEXAPRO) 20 MG tablet, Take 1 tablet by mouth Daily., Disp: , Rfl:     ezetimibe (ZETIA) 10 MG tablet, Take 1 tablet by mouth Daily., Disp: , Rfl:     finasteride (PROSCAR) 5 MG tablet, Take 1 tablet by mouth Daily., Disp: 90 tablet, Rfl: 4    furosemide (LASIX) 40 MG tablet, Take 1 tablet by mouth Daily., Disp: 90 tablet, Rfl: 1    levothyroxine (SYNTHROID, LEVOTHROID) 50 MCG tablet, Take 1 tablet by mouth Daily., Disp: , Rfl:     Loratadine 10 MG capsule, Take 1 capsule by mouth Every Other Day., Disp: , Rfl:     meclizine (ANTIVERT) 25 MG tablet, Take 1 tablet by mouth 3 (Three) Times a Day As Needed for Dizziness., Disp: , Rfl:     multivitamin with minerals tablet tablet, Take " "1 tablet by mouth Daily., Disp: , Rfl:     potassium chloride 10 MEQ CR tablet, Take 1 tablet by mouth Daily., Disp: 90 tablet, Rfl: 3    rivaroxaban (Xarelto) 20 MG tablet, Take 1 tablet by mouth Daily With Dinner., Disp: 90 tablet, Rfl: 3    tamsulosin (FLOMAX) 0.4 MG capsule 24 hr capsule, Take 1 capsule by mouth Daily., Disp: 90 capsule, Rfl: 4    tiotropium bromide monohydrate (SPIRIVA RESPIMAT) 2.5 MCG/ACT aerosol solution inhaler, Inhale 2 puffs Daily., Disp: 1 each, Rfl: 5    tiotropium bromide monohydrate (Spiriva Respimat) 2.5 MCG/ACT aerosol solution inhaler, Inhale 2 puffs Daily for 1 day., Disp: 2 each, Rfl: 0    Allergies   Allergen Reactions    Loratadine Other (See Comments) and Unknown - Low Severity     PATIENT & SPOUSE SAID IF HE TAKES IT TO LONG (OR TO MANY DAYS IN A ROW) IT DRIES HIM OUT & MAKES HIS NOSE BLEED.        History reviewed. No pertinent family history.    Social History     Socioeconomic History    Marital status:    Tobacco Use    Smoking status: Former     Current packs/day: 0.00     Average packs/day: 1 pack/day for 8.8 years (8.8 ttl pk-yrs)     Types: Cigarettes     Start date:      Quit date: 10/14/1961     Years since quittin.4     Passive exposure: Never    Smokeless tobacco: Never   Vaping Use    Vaping status: Never Used   Substance and Sexual Activity    Alcohol use: Never    Drug use: Never    Sexual activity: Defer       Vital Signs:   /75 (BP Location: Left arm, Patient Position: Sitting)   Pulse 73   Resp 14   Ht 188 cm (74.02\")   Wt 93.9 kg (207 lb)   BMI 26.56 kg/m²      Physical Exam     Result Review :   The following data was reviewed by: KAYLEY Foster on 2024:  Results for orders placed or performed in visit on 24   POC Urinalysis Dipstick, Automated    Specimen: Urine   Result Value Ref Range    Color Yellow Yellow, Straw, Dark Yellow, Shonna    Clarity, UA Clear Clear    Specific Gravity  1.025 1.005 - 1.030    pH, " Urine 6.0 5.0 - 8.0    Leukocytes Negative Negative    Nitrite, UA Negative Negative    Protein, POC Negative Negative mg/dL    Glucose, UA Negative Negative mg/dL    Ketones, UA Negative Negative    Urobilinogen, UA Normal Normal, 0.2 E.U./dL    Bilirubin Negative Negative    Blood, UA Negative Negative    Lot Number [id number]     Expiration Date 2,025/2             Procedures        Assessment and Plan    Diagnoses and all orders for this visit:    1. Gross hematuria (Primary)  -     POC Urinalysis Dipstick, Automated  -     Urinalysis With Microscopic - Urine, Clean Catch; Future    2. Benign prostatic hyperplasia with weak urinary stream  -     POC Urinalysis Dipstick, Automated  -     finasteride (PROSCAR) 5 MG tablet; Take 1 tablet by mouth Daily.  Dispense: 90 tablet; Refill: 4  -     tamsulosin (FLOMAX) 0.4 MG capsule 24 hr capsule; Take 1 capsule by mouth Daily.  Dispense: 90 capsule; Refill: 4  -     Urinalysis With Microscopic - Urine, Clean Catch; Future      He is doing well at this time.     Dose not want to pursue workup at this time.     We will continue tamsulosin and finasteride.    I spent 10 minutes caring for Justyn on this date of service. This time includes time spent by me in the following activities:reviewing tests, obtaining and/or reviewing a separately obtained history, performing a medically appropriate examination and/or evaluation , counseling and educating the patient/family/caregiver, ordering medications, tests, or procedures, and documenting information in the medical record  Follow Up   Return in about 1 year (around 3/22/2025) for annual f/u with PVR and ua micro before .  Patient was given instructions and counseling regarding his condition or for health maintenance advice. Please see specific information pulled into the AVS if appropriate.         This document has been electronically signed by KAYLEY Foster  March 22, 2024 10:50 EDT

## 2024-04-02 RX ORDER — FUROSEMIDE 40 MG/1
40 TABLET ORAL DAILY
Qty: 90 TABLET | Refills: 1 | Status: SHIPPED | OUTPATIENT
Start: 2024-04-02

## 2024-05-20 ENCOUNTER — TELEPHONE (OUTPATIENT)
Dept: CARDIOLOGY | Facility: CLINIC | Age: 84
End: 2024-05-20
Payer: MEDICARE

## 2024-05-20 NOTE — TELEPHONE ENCOUNTER
The [facility] received a fax that requires your attention. The document has been indexed to the patient’s chart for your review.      Reason for sending: EXTERNAL MEDICAL RECORD NOTIFICATION     Documents Description: [id number]    Name of Sender: [employer]     Date Indexed: 5.15.24

## 2024-06-12 ENCOUNTER — OFFICE VISIT (OUTPATIENT)
Dept: PULMONOLOGY | Facility: CLINIC | Age: 84
End: 2024-06-12
Payer: MEDICARE

## 2024-06-12 VITALS
HEART RATE: 74 BPM | OXYGEN SATURATION: 96 % | WEIGHT: 207.4 LBS | DIASTOLIC BLOOD PRESSURE: 86 MMHG | RESPIRATION RATE: 18 BRPM | TEMPERATURE: 97.8 F | SYSTOLIC BLOOD PRESSURE: 136 MMHG | HEIGHT: 74 IN | BODY MASS INDEX: 26.62 KG/M2

## 2024-06-12 DIAGNOSIS — G47.33 OBSTRUCTIVE SLEEP APNEA: ICD-10-CM

## 2024-06-12 DIAGNOSIS — J41.8 MIXED SIMPLE AND MUCOPURULENT CHRONIC BRONCHITIS: Primary | ICD-10-CM

## 2024-06-12 PROCEDURE — 1160F RVW MEDS BY RX/DR IN RCRD: CPT | Performed by: INTERNAL MEDICINE

## 2024-06-12 PROCEDURE — 1159F MED LIST DOCD IN RCRD: CPT | Performed by: INTERNAL MEDICINE

## 2024-06-12 PROCEDURE — 3079F DIAST BP 80-89 MM HG: CPT | Performed by: INTERNAL MEDICINE

## 2024-06-12 PROCEDURE — 99213 OFFICE O/P EST LOW 20 MIN: CPT | Performed by: INTERNAL MEDICINE

## 2024-06-12 PROCEDURE — 3075F SYST BP GE 130 - 139MM HG: CPT | Performed by: INTERNAL MEDICINE

## 2024-06-12 NOTE — PROGRESS NOTES
Pulmonary Office Follow-up    Subjective     Justyn Galo is seen today at the office for   Chief Complaint   Patient presents with    Follow-up     6 month    oxygen    Sleep Apnea         HPI  Justyn Galo is a 84 y.o. male with a PMH significant for COPD and history of tobacco abuse with Parkinson's disease REM behavior disorder presents for follow-up patient has been doing well and denies any shortness of breath cough wheeze or expectoration he takes his inhaler and has already quit smoking      Tobacco use history:  Former smoker      Patient Active Problem List   Diagnosis    Atrial fibrillation, persistent     Primary hypertension    History of CVA (cerebrovascular accident)    Hypothyroidism    Gross hematuria    Iron deficiency anemia    Olecranon bursitis of right elbow    Diarrhea with dehydration    Acute kidney failure    Recurrent falls    Chronic heart failure with preserved ejection fraction (HFpEF)    Parkinson's syndrome    RBD (REM behavioral disorder)    Periodic limb movement disorder (PLMD)    Acid reflux    Oropharyngeal dysphagia       Review of Systems  Review of Systems   All other systems reviewed and are negative.    As described in the HPI. Otherwise, remainder of ROS (14 systems) were negative.    Medications, Allergies, Social, and Family Histories reviewed as per EMR.    Result Review :            Objective     Vitals:    06/12/24 1549   BP: 136/86   Pulse: 74   Resp: 18   Temp: 97.8 °F (36.6 °C)   SpO2: 96%         06/12/24  1549   Weight: 94.1 kg (207 lb 6.4 oz)       Physical Exam  Vitals and nursing note reviewed.   Constitutional:       Appearance: Normal appearance.   HENT:      Head: Normocephalic and atraumatic.      Nose: Nose normal.      Mouth/Throat:      Mouth: Mucous membranes are moist.      Pharynx: Oropharynx is clear.   Eyes:      Extraocular Movements: Extraocular movements intact.      Conjunctiva/sclera: Conjunctivae normal.      Pupils: Pupils are  equal, round, and reactive to light.   Cardiovascular:      Rate and Rhythm: Normal rate and regular rhythm.      Pulses: Normal pulses.      Heart sounds: Normal heart sounds.   Pulmonary:      Effort: Pulmonary effort is normal.      Breath sounds: Normal breath sounds.   Abdominal:      General: Abdomen is flat. Bowel sounds are normal.      Palpations: Abdomen is soft.   Musculoskeletal:         General: Normal range of motion.      Cervical back: Normal range of motion and neck supple.   Skin:     General: Skin is warm.      Capillary Refill: Capillary refill takes 2 to 3 seconds.   Neurological:      General: No focal deficit present.      Mental Status: He is alert and oriented to person, place, and time.   Psychiatric:         Mood and Affect: Mood normal.         Behavior: Behavior normal.         No radiology results for the last 90 days.     Assessment & Plan     Diagnoses and all orders for this visit:    1. Mixed simple and mucopurulent chronic bronchitis (Primary)    2. Obstructive sleep apnea         Discussion/ Recommendations:   Patient is advised to continue his home medications  Patient is seeing sleep doctor for his sleep apnea and REM behavior disorder  Vaccinations discussed and recommended             Return in about 6 months (around 12/12/2024).          This document has been electronically signed by Trace Lai MD on June 12, 2024 15:56 EDT

## 2024-06-13 ENCOUNTER — TELEPHONE (OUTPATIENT)
Dept: CARDIOLOGY | Facility: CLINIC | Age: 84
End: 2024-06-13
Payer: MEDICARE

## 2024-06-13 NOTE — TELEPHONE ENCOUNTER
The [facility] received a fax that requires your attention. The document has been indexed to the patient’s chart for your review.      Reason for sending: EXTERNAL MEDICAL RECORD NOTIFICATION     Documents Description: [id number]    Name of Sender: FRANNIE     Date Indexed: 6.5.24

## 2024-06-14 ENCOUNTER — TELEPHONE (OUTPATIENT)
Dept: CARDIOLOGY | Facility: CLINIC | Age: 84
End: 2024-06-14
Payer: MEDICARE

## 2024-06-14 NOTE — TELEPHONE ENCOUNTER
The [facility] received a fax that requires your attention. The document has been indexed to the patient’s chart for your review.      Reason for sending: EXTERNAL MEDICAL RECORD NOTIFICATION     Documents Description: [id number]    Name of Sender: FRANNIE     Date Indexed: 6.12.24

## 2024-06-25 ENCOUNTER — OFFICE VISIT (OUTPATIENT)
Dept: NEUROLOGY | Facility: CLINIC | Age: 84
End: 2024-06-25
Payer: MEDICARE

## 2024-06-25 VITALS
HEIGHT: 74 IN | SYSTOLIC BLOOD PRESSURE: 103 MMHG | BODY MASS INDEX: 25.93 KG/M2 | WEIGHT: 202 LBS | DIASTOLIC BLOOD PRESSURE: 59 MMHG | HEART RATE: 70 BPM

## 2024-06-25 DIAGNOSIS — G20.A1 PARKINSON'S DISEASE WITHOUT DYSKINESIA OR FLUCTUATING MANIFESTATIONS: Primary | ICD-10-CM

## 2024-06-25 PROCEDURE — 99214 OFFICE O/P EST MOD 30 MIN: CPT | Performed by: PSYCHIATRY & NEUROLOGY

## 2024-06-25 PROCEDURE — 1160F RVW MEDS BY RX/DR IN RCRD: CPT | Performed by: PSYCHIATRY & NEUROLOGY

## 2024-06-25 PROCEDURE — 3074F SYST BP LT 130 MM HG: CPT | Performed by: PSYCHIATRY & NEUROLOGY

## 2024-06-25 PROCEDURE — 1159F MED LIST DOCD IN RCRD: CPT | Performed by: PSYCHIATRY & NEUROLOGY

## 2024-06-25 PROCEDURE — 3078F DIAST BP <80 MM HG: CPT | Performed by: PSYCHIATRY & NEUROLOGY

## 2024-06-25 NOTE — ASSESSMENT & PLAN NOTE
Discussed with the wife to give him carbidopa/levodopa 25/100 2 tablets 4 times a day at 8 AM, 12 noon, 4 PM and 8 PM.  She is to call me in the next week to give a progress report otherwise I will see him again in 8 months time for follow-up.  Thank for letting me participate in his care.

## 2024-06-25 NOTE — PROGRESS NOTES
"Chief Complaint  Follow-up (Med check)    Subjective          Justyn Galo is a 84 y.o. male who presents to [facility]  History of Present Illness  84-year-old man here for follow-up of his Parkinson's disease.  He is taking carbidopa/levodopa 25/100 2 tablets 3 times a day.  His wife is with him.  His wife states that she does not assist him for activities of daily living and has a funny way of taking off his shirt and his pants.  It takes him a long time to put his shirt and pants on.  He usually gets awakened around 10:00 in the morning by his wife and is does not take his first carbidopa/levodopa until 12 noon and then at 4:00 then 8:00.  Usually goes to bed around 11:00.  He states that he is sometimes unsteady although he does not have any start hesitation.  His wife states that he has fallen down 3 times.  He is using a walker.      Objective   Vital Signs:   /59   Pulse 70   Ht 188 cm (74.02\")   Wt 91.6 kg (202 lb)   BMI 25.92 kg/m²     Physical Exam   Alert, fluent, phasic.  He cannot tell me details regarding when he would met his wife although he can partially tell me stories when he was younger.  He can tell me the year the month not the day but he can tell me the date.  He can tell me the place, floor, city.  There is no tremor noted.  There is no significant bradykinesia.  Is able to get up without assistance and uses walker to ambulate.  Heart is regular and rhythm normal in rate        Assessment and Plan  Diagnoses and all orders for this visit:    1. Parkinson's disease without dyskinesia or fluctuating manifestations (Primary)  Assessment & Plan:  Discussed with the wife to give him carbidopa/levodopa 25/100 2 tablets 4 times a day at 8 AM, 12 noon, 4 PM and 8 PM.  She is to call me in the next week to give a progress report otherwise I will see him again in 8 months time for follow-up.  Thank for letting me participate in his care.       "     Total time spent with the patient and coordinating patient care was 30 minutes.    Follow Up  No follow-ups on file.  Patient was given instructions and counseling regarding his condition or for health maintenance advice. Please see specific information pulled into the AVS if appropriate.

## 2024-07-02 ENCOUNTER — OFFICE VISIT (OUTPATIENT)
Dept: CARDIOLOGY | Facility: CLINIC | Age: 84
End: 2024-07-02
Payer: MEDICARE

## 2024-07-02 VITALS
WEIGHT: 203 LBS | HEART RATE: 65 BPM | HEIGHT: 74 IN | DIASTOLIC BLOOD PRESSURE: 71 MMHG | BODY MASS INDEX: 26.05 KG/M2 | SYSTOLIC BLOOD PRESSURE: 118 MMHG

## 2024-07-02 DIAGNOSIS — I48.19 ATRIAL FIBRILLATION, PERSISTENT: Primary | ICD-10-CM

## 2024-07-02 DIAGNOSIS — I10 PRIMARY HYPERTENSION: ICD-10-CM

## 2024-07-02 DIAGNOSIS — I50.32 CHRONIC HEART FAILURE WITH PRESERVED EJECTION FRACTION (HFPEF): ICD-10-CM

## 2024-07-02 PROBLEM — R19.7 DIARRHEA WITH DEHYDRATION: Status: RESOLVED | Noted: 2023-07-07 | Resolved: 2024-07-02

## 2024-07-02 RX ORDER — FUROSEMIDE 40 MG/1
40 TABLET ORAL DAILY
Qty: 90 TABLET | Refills: 3 | Status: SHIPPED | OUTPATIENT
Start: 2024-07-02

## 2024-07-02 RX ORDER — CARVEDILOL 6.25 MG/1
6.25 TABLET ORAL 2 TIMES DAILY
Qty: 60 TABLET | Refills: 3 | Status: SHIPPED | OUTPATIENT
Start: 2024-07-02

## 2024-07-02 NOTE — PROGRESS NOTES
"Chief Complaint  Follow-up    Subjective            History of Present Illness  Justyn Galo is an 84-year-old male patient who presents to the office today for follow-up.  He has atrial fibrillation, chronic HFpEF, and hypertension.  He is compliant with medication.  He denies any new or worsening cardiac symptoms today.    PMH  Past Medical History:   Diagnosis Date    Atrial fibrillation, persistent  10/14/2021    BPH (benign prostatic hyperplasia)     Disease of thyroid gland     Essential hypertension 10/14/2021    Falls frequently     SPOUSE REPORTS MULTIPLE FALLS \"EVERY WEEK. I'VE LOST COUNT.\"    Generalized weakness     History of CVA (cerebrovascular accident)     RESIDUAL OF LEFT SIDE WEAKNESS    Parkinson disease 2023    Urgency of urination     WEARS BRIEFS AT HOME         ALLERGY  Allergies   Allergen Reactions    Loratadine Other (See Comments) and Unknown - Low Severity     PATIENT & SPOUSE SAID IF HE TAKES IT TO LONG (OR TO MANY DAYS IN A ROW) IT DRIES HIM OUT & MAKES HIS NOSE BLEED.          SURGICALHX  Past Surgical History:   Procedure Laterality Date    LEG SURGERY Right           SOC  Social History     Socioeconomic History    Marital status:    Tobacco Use    Smoking status: Former     Current packs/day: 0.00     Average packs/day: 1 pack/day for 8.8 years (8.8 ttl pk-yrs)     Types: Cigarettes     Start date:      Quit date: 10/14/1961     Years since quittin.7     Passive exposure: Never    Smokeless tobacco: Never   Vaping Use    Vaping status: Never Used   Substance and Sexual Activity    Alcohol use: Never    Drug use: Never    Sexual activity: Defer         FAMHX  History reviewed. No pertinent family history.       MEDSIGONLY  Current Outpatient Medications on File Prior to Visit   Medication Sig    acetaminophen (TYLENOL) 500 MG tablet Take 1 tablet by mouth Every 6 (Six) Hours As Needed for Mild Pain.    carbidopa-levodopa (SINEMET)  MG per tablet " "Titrate as directed and take up to 3 tablets 3 times a day at 12 noon, 4 PM and 8 PM.    carvedilol (COREG) 25 MG tablet Take 0.5 tablets by mouth 2 (Two) Times a Day.    dilTIAZem XR (DILACOR XR) 120 MG 24 hr capsule Take 1 capsule by mouth Daily.    escitalopram (LEXAPRO) 20 MG tablet Take 1 tablet by mouth Daily.    ezetimibe (ZETIA) 10 MG tablet Take 1 tablet by mouth Daily.    finasteride (PROSCAR) 5 MG tablet Take 1 tablet by mouth Daily.    furosemide (LASIX) 40 MG tablet TAKE 1 TABLET BY MOUTH DAILY    levothyroxine (SYNTHROID, LEVOTHROID) 50 MCG tablet Take 1 tablet by mouth Daily.    Loratadine 10 MG capsule Take 1 capsule by mouth Every Other Day.    multivitamin with minerals tablet tablet Take 1 tablet by mouth Daily.    potassium chloride 10 MEQ CR tablet Take 1 tablet by mouth Daily.    rivaroxaban (Xarelto) 20 MG tablet Take 1 tablet by mouth Daily With Dinner.    tamsulosin (FLOMAX) 0.4 MG capsule 24 hr capsule Take 1 capsule by mouth Daily.    [DISCONTINUED] meclizine (ANTIVERT) 25 MG tablet Take 1 tablet by mouth 3 (Three) Times a Day As Needed for Dizziness. (Patient not taking: Reported on 7/2/2024)    [DISCONTINUED] tiotropium bromide monohydrate (SPIRIVA RESPIMAT) 2.5 MCG/ACT aerosol solution inhaler Inhale 2 puffs Daily. (Patient not taking: Reported on 7/2/2024)    [DISCONTINUED] tiotropium bromide monohydrate (Spiriva Respimat) 2.5 MCG/ACT aerosol solution inhaler Inhale 2 puffs Daily for 1 day. (Patient not taking: Reported on 7/2/2024)     No current facility-administered medications on file prior to visit.         Objective   /71   Pulse 65   Ht 188 cm (74.02\")   Wt 92.1 kg (203 lb)   BMI 26.05 kg/m²       Physical Exam  HENT:      Head: Normocephalic.   Neck:      Vascular: No carotid bruit.   Cardiovascular:      Rate and Rhythm: Normal rate and regular rhythm.      Pulses: Normal pulses.      Heart sounds: Normal heart sounds. No murmur heard.  Pulmonary:      Effort: " "Pulmonary effort is normal.      Breath sounds: Normal breath sounds.   Musculoskeletal:      Cervical back: Neck supple.      Right lower leg: No edema.      Left lower leg: No edema.   Skin:     General: Skin is dry.   Neurological:      Mental Status: He is alert and oriented to person, place, and time.   Psychiatric:         Behavior: Behavior normal.       Result Review :   The following data was reviewed by: KAYLEY Barksdale on 07/02/2024:  proBNP   Date Value Ref Range Status   09/25/2023 840.0 0.0 - 1,800.0 pg/mL Final         12/20/2023    12:29   CMP   Glucose 110    BUN 24    Creatinine 1.23    EGFR 58.6    Sodium 135    Potassium 4.6    Chloride 98    Calcium 9.9    Total Protein 7.7    Albumin 4.4    Globulin 3.3    Total Bilirubin 0.8    Alkaline Phosphatase 112    AST (SGOT) 20    ALT (SGPT) 6    Albumin/Globulin Ratio 1.3    BUN/Creatinine Ratio 19.5    Anion Gap 9.5      CBC w/diff          12/20/2023    12:29   CBC w/Diff   WBC 5.75    RBC 3.86    Hemoglobin 12.7    Hematocrit 39.5    .3    MCH 32.9    MCHC 32.2    RDW 13.8    Platelets 162    Neutrophil Rel % 62.3    Immature Granulocyte Rel % 0.3    Lymphocyte Rel % 26.6    Monocyte Rel % 7.5    Eosinophil Rel % 3.0    Basophil Rel % 0.3       Lab Results   Component Value Date    TSH 1.830 12/29/2022      No results found for: \"FREET4\"   No results found for: \"DDIMERQUANT\"  Magnesium   Date Value Ref Range Status   12/20/2023 2.1 1.6 - 2.4 mg/dL Final      No results found for: \"DIGOXIN\"   Lab Results   Component Value Date    TROPONINT 28 (H) 12/20/2023           Results for orders placed in visit on 09/22/23    Adult Transthoracic Echo Complete W/ Cont if Necessary Per Protocol    Interpretation Summary    Left ventricular systolic function is normal. Calculated left ventricular EF = 55.3%    Left ventricular diastolic function was indeterminate.    The right ventricular cavity is borderline dilated.    The left atrial cavity is " moderately dilated.    The right atrial cavity is moderately  dilated.    There is calcification of the aortic valve.    Moderate tricuspid valve regurgitation is present.    Estimated right ventricular systolic pressure from tricuspid regurgitation is mildly elevated (35-45 mmHg).         Assessment and Plan    Diagnoses and all orders for this visit:    1. Atrial fibrillation, persistent  (Primary)  Symptomatically stable at this time, rate controlled, continue carvedilol 6.25 mg twice daily and diltiazem 120 mg daily.  Continue Xarelto for CVA prevention.    2. Chronic heart failure with preserved ejection fraction (HFpEF)  Symptomatically stable at this time and euvolemic on exam today, continue Lasix 40 mg daily.  Check BMP and proBNP to assess CHF, renal function, and electrolytes.  -     Basic Metabolic Panel; Future  -     proBNP; Future    3. Primary hypertension  Currently controlled however on the lower end of normal range.  Reducing carvedilol dose to 6.25 mg twice daily. Check blood pressure twice a day for the next two weeks, blood pressure log provided for patient.  Will review log once available to me will make any necessary medication adjustments at that time.    Other orders  -     furosemide (LASIX) 40 MG tablet; Take 1 tablet by mouth Daily.  Dispense: 90 tablet; Refill: 3  -     carvedilol (COREG) 6.25 MG tablet; Take 1 tablet by mouth 2 (Two) Times a Day.  Dispense: 60 tablet; Refill: 3            Follow Up   Return in about 6 months (around 1/2/2025) for Follow up with Dr Ortega.    Patient was given instructions and counseling regarding his condition or for health maintenance advice. Please see specific information pulled into the AVS if appropriate.     Justyn Galo  reports that he quit smoking about 62 years ago. His smoking use included cigarettes. He started smoking about 71 years ago. He has a 8.8 pack-year smoking history. He has never been exposed to tobacco smoke. He has never  used smokeless tobacco.            Peggy Ordonez, APRN  07/02/24  10:20 EDT    Dictated Utilizing Dragon Dictation

## 2024-07-08 ENCOUNTER — TELEPHONE (OUTPATIENT)
Dept: PULMONOLOGY | Facility: CLINIC | Age: 84
End: 2024-07-08
Payer: MEDICARE

## 2024-07-08 NOTE — TELEPHONE ENCOUNTER
Patients wife, Soledad, came into the office with a letter from [employer] that stated they are needing documentation for his oxygen rental. If you have any questions please call her at [phone]

## 2024-07-15 ENCOUNTER — LAB (OUTPATIENT)
Dept: LAB | Facility: HOSPITAL | Age: 84
End: 2024-07-15
Payer: MEDICARE

## 2024-07-15 DIAGNOSIS — I50.32 CHRONIC HEART FAILURE WITH PRESERVED EJECTION FRACTION (HFPEF): ICD-10-CM

## 2024-07-15 LAB
ANION GAP SERPL CALCULATED.3IONS-SCNC: 12 MMOL/L (ref 5–15)
BUN SERPL-MCNC: 31 MG/DL (ref 8–23)
BUN/CREAT SERPL: 21.7 (ref 7–25)
CALCIUM SPEC-SCNC: 10 MG/DL (ref 8.6–10.5)
CHLORIDE SERPL-SCNC: 103 MMOL/L (ref 98–107)
CO2 SERPL-SCNC: 28 MMOL/L (ref 22–29)
CREAT SERPL-MCNC: 1.43 MG/DL (ref 0.76–1.27)
EGFRCR SERPLBLD CKD-EPI 2021: 48.6 ML/MIN/1.73
GLUCOSE SERPL-MCNC: 103 MG/DL (ref 65–99)
NT-PROBNP SERPL-MCNC: 1267 PG/ML (ref 0–1800)
POTASSIUM SERPL-SCNC: 4.4 MMOL/L (ref 3.5–5.2)
SODIUM SERPL-SCNC: 143 MMOL/L (ref 136–145)

## 2024-07-15 PROCEDURE — 80048 BASIC METABOLIC PNL TOTAL CA: CPT

## 2024-07-15 PROCEDURE — 83880 ASSAY OF NATRIURETIC PEPTIDE: CPT

## 2024-07-15 PROCEDURE — 36415 COLL VENOUS BLD VENIPUNCTURE: CPT

## 2024-07-17 ENCOUNTER — TELEPHONE (OUTPATIENT)
Dept: CARDIOLOGY | Facility: CLINIC | Age: 84
End: 2024-07-17
Payer: MEDICARE

## 2024-07-17 DIAGNOSIS — N28.9 ABNORMAL KIDNEY FUNCTION: Primary | ICD-10-CM

## 2024-07-17 NOTE — TELEPHONE ENCOUNTER
----- Message from Peggy Ordonez sent at 7/16/2024  7:52 PM EDT -----  Renal function is declined and electrolytes are in normal range. BNP is in normal range. Repeat BMP in 4 weeks to monitor renal function

## 2024-07-24 ENCOUNTER — TELEPHONE (OUTPATIENT)
Dept: CARDIOLOGY | Facility: CLINIC | Age: 84
End: 2024-07-24
Payer: MEDICARE

## 2024-07-31 RX ORDER — DILTIAZEM HYDROCHLORIDE 120 MG/1
120 CAPSULE, EXTENDED RELEASE ORAL DAILY
Qty: 90 CAPSULE | Refills: 3 | Status: SHIPPED | OUTPATIENT
Start: 2024-07-31

## 2024-08-05 RX ORDER — POTASSIUM CHLORIDE 750 MG/1
10 TABLET, FILM COATED, EXTENDED RELEASE ORAL DAILY
Qty: 90 TABLET | Refills: 3 | Status: SHIPPED | OUTPATIENT
Start: 2024-08-05

## 2024-08-05 NOTE — TELEPHONE ENCOUNTER
Rx Refill Note  Requested Prescriptions     Pending Prescriptions Disp Refills    potassium chloride 10 MEQ CR tablet [Pharmacy Med Name: POTASSIUM CL ER 10 MEQ TABLET] 90 tablet 3     Sig: TAKE 1 TABLET BY MOUTH DAILY        LAST OFFICE VISIT:  7/2/2024     NEXT OFFICE VISIT:  01/26/2025     Does the medication requests match the last office note:    [x] Yes   [] No    Does this refill request meet protocol details for MA to approve:     [x] Yes   [] No

## 2024-08-07 RX ORDER — POTASSIUM CHLORIDE 750 MG/1
10 TABLET, FILM COATED, EXTENDED RELEASE ORAL DAILY
Qty: 90 TABLET | Refills: 3 | OUTPATIENT
Start: 2024-08-07

## 2024-08-08 ENCOUNTER — LAB (OUTPATIENT)
Dept: LAB | Facility: HOSPITAL | Age: 84
End: 2024-08-08
Payer: MEDICARE

## 2024-08-08 DIAGNOSIS — I50.32 CHRONIC HEART FAILURE WITH PRESERVED EJECTION FRACTION (HFPEF): ICD-10-CM

## 2024-08-08 DIAGNOSIS — N28.9 ABNORMAL KIDNEY FUNCTION: ICD-10-CM

## 2024-08-08 LAB
ANION GAP SERPL CALCULATED.3IONS-SCNC: 14.7 MMOL/L (ref 5–15)
BUN SERPL-MCNC: 26 MG/DL (ref 8–23)
BUN/CREAT SERPL: 19.4 (ref 7–25)
CALCIUM SPEC-SCNC: 10.4 MG/DL (ref 8.6–10.5)
CHLORIDE SERPL-SCNC: 99 MMOL/L (ref 98–107)
CO2 SERPL-SCNC: 28.3 MMOL/L (ref 22–29)
CREAT SERPL-MCNC: 1.34 MG/DL (ref 0.76–1.27)
EGFRCR SERPLBLD CKD-EPI 2021: 52.6 ML/MIN/1.73
GLUCOSE SERPL-MCNC: 155 MG/DL (ref 65–99)
POTASSIUM SERPL-SCNC: 4.1 MMOL/L (ref 3.5–5.2)
SODIUM SERPL-SCNC: 142 MMOL/L (ref 136–145)

## 2024-08-08 PROCEDURE — 80048 BASIC METABOLIC PNL TOTAL CA: CPT

## 2024-08-08 PROCEDURE — 36415 COLL VENOUS BLD VENIPUNCTURE: CPT

## 2024-08-09 ENCOUNTER — TELEPHONE (OUTPATIENT)
Dept: CARDIOLOGY | Facility: CLINIC | Age: 84
End: 2024-08-09
Payer: MEDICARE

## 2024-08-09 NOTE — TELEPHONE ENCOUNTER
----- Message from Peggy Ordonez sent at 8/9/2024  9:44 AM EDT -----  Renal function is improved and electrolytes are in normal range, continue current meds

## 2024-09-23 RX ORDER — CARBIDOPA AND LEVODOPA 25; 100 MG/1; MG/1
TABLET ORAL
Qty: 270 TABLET | Refills: 5 | Status: SHIPPED | OUTPATIENT
Start: 2024-09-23

## 2024-09-27 ENCOUNTER — TELEPHONE (OUTPATIENT)
Dept: CARDIOLOGY | Facility: CLINIC | Age: 84
End: 2024-09-27
Payer: MEDICARE

## 2024-09-27 NOTE — TELEPHONE ENCOUNTER
[facility] received a fax that requires your attention. The document has been indexed to the patient’s chart for your review.      Reason for sending: EXTERNAL MEDICAL RECORD NOTIFICATION     Documents Description: [id number]    Name of Sender: FRANNIE     Date Indexed: 9.27.24

## 2024-10-11 RX ORDER — RIVAROXABAN 20 MG/1
20 TABLET, FILM COATED ORAL
Qty: 90 TABLET | Refills: 3 | Status: SHIPPED | OUTPATIENT
Start: 2024-10-11

## 2024-10-11 NOTE — TELEPHONE ENCOUNTER
Rx Refill Note  Requested Prescriptions     Pending Prescriptions Disp Refills    Xarelto 20 MG tablet [Pharmacy Med Name: XARELTO 20 MG TABLET] 90 tablet 3     Sig: TAKE 1 TABLET BY MOUTH DAILY WITH DINNER        LAST OFFICE VISIT:  7/2/2024     NEXT OFFICE VISIT:  01/06/2025     Does the medication requests match the last office note:    [x] Yes   [] No    Does this refill request meet protocol details for MA to approve:     [x] Yes   [] No

## 2024-10-17 ENCOUNTER — TELEPHONE (OUTPATIENT)
Dept: CARDIOLOGY | Facility: CLINIC | Age: 84
End: 2024-10-17
Payer: MEDICARE

## 2024-10-17 NOTE — TELEPHONE ENCOUNTER
The [facility] received a fax that requires your attention. The document has been indexed to the patient’s chart for your review.      Reason for sending: EXTERNAL MEDICAL RECORD NOTIFICATION     Documents Description: [id number]    Name of Sender: FRANNIE     Date Indexed: 10.17.24

## 2024-11-11 RX ORDER — CARVEDILOL 6.25 MG/1
6.25 TABLET ORAL 2 TIMES DAILY
Qty: 60 TABLET | Refills: 3 | Status: SHIPPED | OUTPATIENT
Start: 2024-11-11

## 2024-12-17 ENCOUNTER — OFFICE VISIT (OUTPATIENT)
Dept: PULMONOLOGY | Facility: CLINIC | Age: 84
End: 2024-12-17
Payer: MEDICARE

## 2024-12-17 ENCOUNTER — HOSPITAL ENCOUNTER (OUTPATIENT)
Dept: GENERAL RADIOLOGY | Facility: HOSPITAL | Age: 84
Discharge: HOME | End: 2024-12-17
Payer: MEDICARE

## 2024-12-17 VITALS
OXYGEN SATURATION: 96 % | WEIGHT: 202.6 LBS | DIASTOLIC BLOOD PRESSURE: 88 MMHG | SYSTOLIC BLOOD PRESSURE: 135 MMHG | BODY MASS INDEX: 26 KG/M2 | RESPIRATION RATE: 14 BRPM | HEIGHT: 74 IN | TEMPERATURE: 97.6 F | HEART RATE: 100 BPM

## 2024-12-17 DIAGNOSIS — J41.8 MIXED SIMPLE AND MUCOPURULENT CHRONIC BRONCHITIS: Primary | ICD-10-CM

## 2024-12-17 DIAGNOSIS — I50.32 CHRONIC DIASTOLIC CHF (CONGESTIVE HEART FAILURE): ICD-10-CM

## 2024-12-17 DIAGNOSIS — G47.33 OBSTRUCTIVE SLEEP APNEA: ICD-10-CM

## 2024-12-17 DIAGNOSIS — I48.19 ATRIAL FIBRILLATION, PERSISTENT: ICD-10-CM

## 2024-12-17 DIAGNOSIS — J41.8 MIXED SIMPLE AND MUCOPURULENT CHRONIC BRONCHITIS: ICD-10-CM

## 2024-12-17 PROCEDURE — 3075F SYST BP GE 130 - 139MM HG: CPT | Performed by: INTERNAL MEDICINE

## 2024-12-17 PROCEDURE — 3079F DIAST BP 80-89 MM HG: CPT | Performed by: INTERNAL MEDICINE

## 2024-12-17 PROCEDURE — 1160F RVW MEDS BY RX/DR IN RCRD: CPT | Performed by: INTERNAL MEDICINE

## 2024-12-17 PROCEDURE — 99214 OFFICE O/P EST MOD 30 MIN: CPT | Performed by: INTERNAL MEDICINE

## 2024-12-17 PROCEDURE — 71046 X-RAY EXAM CHEST 2 VIEWS: CPT

## 2024-12-17 PROCEDURE — 1159F MED LIST DOCD IN RCRD: CPT | Performed by: INTERNAL MEDICINE

## 2024-12-17 RX ORDER — ALBUTEROL SULFATE 90 UG/1
2 INHALANT RESPIRATORY (INHALATION) EVERY 4 HOURS PRN
Qty: 1 G | Refills: 3 | Status: SHIPPED | OUTPATIENT
Start: 2024-12-17

## 2024-12-17 RX ORDER — DILTIAZEM HYDROCHLORIDE 120 MG/1
1 CAPSULE, EXTENDED RELEASE ORAL DAILY
COMMUNITY
Start: 2024-07-24

## 2024-12-17 RX ORDER — MECLIZINE HYDROCHLORIDE 25 MG/1
25 TABLET ORAL DAILY
COMMUNITY
Start: 2024-07-24

## 2024-12-17 RX ORDER — TIOTROPIUM BROMIDE INHALATION SPRAY 3.12 UG/1
2 SPRAY, METERED RESPIRATORY (INHALATION) DAILY
COMMUNITY
Start: 2024-07-24

## 2024-12-17 NOTE — PROGRESS NOTES
Pulmonary Office Follow-up    Subjective     Justyn Galo is seen today at the office for   Chief Complaint   Patient presents with    Follow-up     6 month    Sleep Apnea    oropharyngeal dysphagia    Shortness of Breath     On exertion    Oxygen         HPI  Justyn Galo is a 84 y.o. male with a PMH significant for COPD and obstructive sleep apnea on home oxygen presents for follow-up patient complains of dyspnea on exertion along with wheezing off-and-on he denies any chest pain fever or hemoptysis he seems to be doing better on antiparkinson his medications he is already being followed by neurology and sleep      Tobacco use history:  Former smoker      Patient Active Problem List   Diagnosis    Atrial fibrillation, persistent     Primary hypertension    History of CVA (cerebrovascular accident)    Hypothyroidism    Gross hematuria    Iron deficiency anemia    Olecranon bursitis of right elbow    Acute kidney failure    Recurrent falls    Chronic heart failure with preserved ejection fraction (HFpEF)    Parkinson's syndrome    RBD (REM behavioral disorder)    Periodic limb movement disorder (PLMD)    Acid reflux    Oropharyngeal dysphagia       Review of Systems  Review of Systems   Respiratory:  Positive for shortness of breath.    All other systems reviewed and are negative.    As described in the HPI. Otherwise, remainder of ROS (14 systems) were negative.    Medications, Allergies, Social, and Family Histories reviewed as per EMR.    Result Review :            Objective     Vitals:    12/17/24 1003   BP: 135/88   Pulse: 100   Resp: 14   Temp: 97.6 °F (36.4 °C)   SpO2: 96%         12/17/24  1003   Weight: 91.9 kg (202 lb 9.6 oz)       Physical Exam  Vitals and nursing note reviewed.   Constitutional:       Appearance: Normal appearance.   HENT:      Head: Normocephalic and atraumatic.      Mouth/Throat:      Mouth: Mucous membranes are moist.      Pharynx: Oropharynx is clear.   Eyes:       Extraocular Movements: Extraocular movements intact.      Conjunctiva/sclera: Conjunctivae normal.      Pupils: Pupils are equal, round, and reactive to light.   Cardiovascular:      Rate and Rhythm: Normal rate. Rhythm irregular.      Pulses: Normal pulses.      Heart sounds: Normal heart sounds.   Pulmonary:      Effort: Pulmonary effort is normal.      Breath sounds: Rhonchi present.   Abdominal:      General: Abdomen is flat. Bowel sounds are normal.      Palpations: Abdomen is soft.   Musculoskeletal:         General: Normal range of motion.      Cervical back: Normal range of motion and neck supple.   Skin:     General: Skin is warm.      Capillary Refill: Capillary refill takes 2 to 3 seconds.   Neurological:      Mental Status: He is alert and oriented to person, place, and time.   Psychiatric:         Mood and Affect: Mood normal.         Behavior: Behavior normal.         No radiology results for the last 90 days.     Assessment & Plan     Diagnoses and all orders for this visit:    1. Mixed simple and mucopurulent chronic bronchitis (Primary)  -     Complete PFT - Pre & Post Bronchodilator; Future  -     XR Chest 2 View; Future    2. Obstructive sleep apnea  -     Complete PFT - Pre & Post Bronchodilator; Future  -     XR Chest 2 View; Future    3. Chronic diastolic CHF (congestive heart failure)    4. Atrial fibrillation, persistent     Other orders  -     albuterol sulfate HFA (Ventolin HFA) 108 (90 Base) MCG/ACT inhaler; Inhale 2 puffs Every 4 (Four) Hours As Needed for Wheezing or Shortness of Air.  Dispense: 1 g; Refill: 3         Discussion/ Recommendations:   Will order chest x-ray and PFT  Albuterol MDI 2 puffs every 6 hours as needed  Continue home medications  Vaccinations discussed and recommended             Return in about 6 months (around 6/17/2025).          This document has been electronically signed by Trace Lai MD on December 17, 2024 10:16 EST

## 2025-02-10 ENCOUNTER — OFFICE VISIT (OUTPATIENT)
Dept: CARDIOLOGY | Facility: CLINIC | Age: 85
End: 2025-02-10
Payer: MEDICARE

## 2025-02-10 VITALS
DIASTOLIC BLOOD PRESSURE: 76 MMHG | HEART RATE: 82 BPM | SYSTOLIC BLOOD PRESSURE: 126 MMHG | BODY MASS INDEX: 24.85 KG/M2 | HEIGHT: 74 IN | WEIGHT: 193.6 LBS

## 2025-02-10 DIAGNOSIS — Z86.73 HISTORY OF CVA (CEREBROVASCULAR ACCIDENT): ICD-10-CM

## 2025-02-10 DIAGNOSIS — I48.19 ATRIAL FIBRILLATION, PERSISTENT: Primary | ICD-10-CM

## 2025-02-10 DIAGNOSIS — I50.32 CHRONIC HEART FAILURE WITH PRESERVED EJECTION FRACTION (HFPEF): ICD-10-CM

## 2025-02-10 DIAGNOSIS — I10 PRIMARY HYPERTENSION: ICD-10-CM

## 2025-02-10 PROCEDURE — 3078F DIAST BP <80 MM HG: CPT | Performed by: INTERNAL MEDICINE

## 2025-02-10 PROCEDURE — 3074F SYST BP LT 130 MM HG: CPT | Performed by: INTERNAL MEDICINE

## 2025-02-10 PROCEDURE — G2211 COMPLEX E/M VISIT ADD ON: HCPCS | Performed by: INTERNAL MEDICINE

## 2025-02-10 PROCEDURE — 99214 OFFICE O/P EST MOD 30 MIN: CPT | Performed by: INTERNAL MEDICINE

## 2025-02-10 NOTE — ASSESSMENT & PLAN NOTE
Blood pressure remained stable no orthostatic symptoms we will certainly watch closely as Parkinson's advances continue with his Coreg 6.25 twice daily and diltiazem 120 daily

## 2025-02-10 NOTE — ASSESSMENT & PLAN NOTE
Patient remains symptomatically stable rate control continue with diltiazem 120 daily and Coreg 12.5 twice daily for rate control.  Patient is on Xarelto 20 daily for CVA prevention

## 2025-02-10 NOTE — ASSESSMENT & PLAN NOTE
Patient stable clinically from a volume standpoint no significant edema no weight gain continue with his Lasix 40 mg once a day

## 2025-02-10 NOTE — PROGRESS NOTES
"Chief Complaint  Atrial fibrillation, persistent (6 month)    Subjective    Patient is been doing well Parkinson symptoms remain stable is not been having issues with falls recently.  Denies any chest pain or tachycardic symptoms  Past Medical History:   Diagnosis Date    Atrial fibrillation, persistent  10/14/2021    BPH (benign prostatic hyperplasia)     Disease of thyroid gland     Essential hypertension 10/14/2021    Falls frequently     SPOUSE REPORTS MULTIPLE FALLS \"EVERY WEEK. I'VE LOST COUNT.\"    Generalized weakness     History of CVA (cerebrovascular accident) 2012    RESIDUAL OF LEFT SIDE WEAKNESS    Parkinson disease 11/2023    Urgency of urination     WEARS BRIEFS AT HOME         Current Outpatient Medications:     acetaminophen (TYLENOL) 500 MG tablet, Take 1 tablet by mouth Every 6 (Six) Hours As Needed for Mild Pain., Disp: , Rfl:     albuterol sulfate HFA (Ventolin HFA) 108 (90 Base) MCG/ACT inhaler, Inhale 2 puffs Every 4 (Four) Hours As Needed for Wheezing or Shortness of Air., Disp: 1 g, Rfl: 3    carbidopa-levodopa (SINEMET)  MG per tablet, 2 tablets 3 times a day at 12 noon, 4 PM and 8 PM., Disp: 270 tablet, Rfl: 5    carvedilol (COREG) 6.25 MG tablet, TAKE 1 TABLET BY MOUTH 2 TIMES A DAY, Disp: 60 tablet, Rfl: 3    Cholecalciferol 125 MCG (5000 UT) tablet dispersible, Place 125 mcg on the tongue Daily., Disp: , Rfl:     dilTIAZem (TIAZAC) 120 MG 24 hr capsule, Take 1 capsule by mouth Daily., Disp: , Rfl:     escitalopram (LEXAPRO) 20 MG tablet, Take 1 tablet by mouth Daily., Disp: , Rfl:     ezetimibe (ZETIA) 10 MG tablet, Take 1 tablet by mouth Daily., Disp: , Rfl:     finasteride (PROSCAR) 5 MG tablet, Take 1 tablet by mouth Daily., Disp: 90 tablet, Rfl: 4    furosemide (LASIX) 40 MG tablet, Take 1 tablet by mouth Daily., Disp: 90 tablet, Rfl: 3    levothyroxine (SYNTHROID, LEVOTHROID) 50 MCG tablet, Take 1 tablet by mouth Daily., Disp: , Rfl:     melatonin 5 MG tablet tablet, Take 1 " "tablet by mouth Every Night., Disp: , Rfl:     multivitamin with minerals tablet tablet, Take 1 tablet by mouth Daily., Disp: , Rfl:     potassium chloride 10 MEQ CR tablet, TAKE 1 TABLET BY MOUTH DAILY, Disp: 90 tablet, Rfl: 3    tamsulosin (FLOMAX) 0.4 MG capsule 24 hr capsule, Take 1 capsule by mouth Daily., Disp: 90 capsule, Rfl: 4    Xarelto 20 MG tablet, TAKE 1 TABLET BY MOUTH DAILY WITH DINNER, Disp: 90 tablet, Rfl: 3    Cholecalciferol 125 MCG (5000 UT) tablet, Take 1 tablet by mouth Daily. (Patient not taking: Reported on 2/10/2025), Disp: , Rfl:     Loratadine 10 MG capsule, Take 1 capsule by mouth Every Other Day. (Patient not taking: Reported on 2/10/2025), Disp: , Rfl:     meclizine (ANTIVERT) 25 MG tablet, Take 1 tablet by mouth Daily. (Patient not taking: Reported on 2/10/2025), Disp: , Rfl:     tiotropium bromide monohydrate (Spiriva Respimat) 2.5 MCG/ACT aerosol solution inhaler, Inhale 2 puffs Daily. (Patient not taking: Reported on 2/10/2025), Disp: , Rfl:     Medications Discontinued During This Encounter   Medication Reason    dilTIAZem XR (DILACOR XR) 120 MG 24 hr capsule      Allergies   Allergen Reactions    Loratadine Other (See Comments) and Unknown - Low Severity     PATIENT & SPOUSE SAID IF HE TAKES IT TO LONG (OR TO MANY DAYS IN A ROW) IT DRIES HIM OUT & MAKES HIS NOSE BLEED.        Social History     Tobacco Use    Smoking status: Former     Current packs/day: 0.00     Average packs/day: 1 pack/day for 8.8 years (8.8 ttl pk-yrs)     Types: Cigarettes     Start date:      Quit date: 10/14/1961     Years since quittin.3     Passive exposure: Never    Smokeless tobacco: Never   Vaping Use    Vaping status: Never Used   Substance Use Topics    Alcohol use: Never    Drug use: Never       History reviewed. No pertinent family history.     Objective     /76 (BP Location: Left arm, Patient Position: Sitting, Cuff Size: Adult)   Pulse 82   Ht 188 cm (74.02\")   Wt 87.8 kg (193 " "lb 9.6 oz)   BMI 24.84 kg/m²       Physical Exam    General Appearance:   no acute distress  Alert and oriented x3  HENT:   lips not cyanotic  Atraumatic  Neck:  No jvd   supple  Respiratory:  no respiratory distress  normal breath sounds  no rales  Cardiovascular:  Irregular irregular  no S3, no S4   no murmur  no rub  Extremities  No cyanosis  lower extremity edema: none    Skin:   warm, dry  No rashes      Result Review :     proBNP   Date Value Ref Range Status   07/15/2024 1,267.0 0.0 - 1,800.0 pg/mL Final     CMP          7/15/2024    16:09 8/8/2024    14:36   CMP   Glucose 103  155    BUN 31  26    Creatinine 1.43  1.34    EGFR 48.6  52.6    Sodium 143  142    Potassium 4.4  4.1    Chloride 103  99    Calcium 10.0  10.4    BUN/Creatinine Ratio 21.7  19.4    Anion Gap 12.0  14.7         Lab Results   Component Value Date    TSH 1.830 12/29/2022      No results found for: \"FREET4\"   No results found for: \"DDIMERQUANT\"  Magnesium   Date Value Ref Range Status   12/20/2023 2.1 1.6 - 2.4 mg/dL Final      No results found for: \"DIGOXIN\"   Lab Results   Component Value Date    TROPONINT 28 (H) 12/20/2023             No results found for: \"POCTROP\"    Results for orders placed in visit on 09/22/23    Adult Transthoracic Echo Complete W/ Cont if Necessary Per Protocol    Interpretation Summary    Left ventricular systolic function is normal. Calculated left ventricular EF = 55.3%    Left ventricular diastolic function was indeterminate.    The right ventricular cavity is borderline dilated.    The left atrial cavity is moderately dilated.    The right atrial cavity is moderately  dilated.    There is calcification of the aortic valve.    Moderate tricuspid valve regurgitation is present.    Estimated right ventricular systolic pressure from tricuspid regurgitation is mildly elevated (35-45 mmHg).                 Diagnoses and all orders for this visit:    1. Atrial fibrillation, persistent  (Primary)  Assessment & " Plan:    Patient remains symptomatically stable rate control continue with diltiazem 120 daily and Coreg 12.5 twice daily for rate control.  Patient is on Xarelto 20 daily for CVA prevention      2. Chronic heart failure with preserved ejection fraction (HFpEF)  Assessment & Plan:  Patient stable clinically from a volume standpoint no significant edema no weight gain continue with his Lasix 40 mg once a day      3. Primary hypertension  Assessment & Plan:  Blood pressure remained stable no orthostatic symptoms we will certainly watch closely as Parkinson's advances continue with his Coreg 6.25 twice daily and diltiazem 120 daily       4. History of CVA (cerebrovascular accident)            Follow Up     Return in about 6 months (around 8/10/2025) for Follow with Peggy Ordonez, EKG with F/U.          Patient was given instructions and counseling regarding his condition or for health maintenance advice. Please see specific information pulled into the AVS if appropriate.

## 2025-02-18 ENCOUNTER — HOSPITAL ENCOUNTER (OUTPATIENT)
Dept: RESPIRATORY THERAPY | Facility: HOSPITAL | Age: 85
Discharge: HOME | End: 2025-02-18
Admitting: INTERNAL MEDICINE
Payer: MEDICARE

## 2025-02-18 DIAGNOSIS — G47.33 OBSTRUCTIVE SLEEP APNEA: ICD-10-CM

## 2025-02-18 DIAGNOSIS — J41.8 MIXED SIMPLE AND MUCOPURULENT CHRONIC BRONCHITIS: ICD-10-CM

## 2025-02-18 PROCEDURE — 94010 BREATHING CAPACITY TEST: CPT

## 2025-02-18 RX ORDER — ALBUTEROL SULFATE 0.83 MG/ML
SOLUTION RESPIRATORY (INHALATION)
Status: DISPENSED
Start: 2025-02-18 | End: 2025-02-18

## 2025-02-18 RX ORDER — ALBUTEROL SULFATE 0.83 MG/ML
2.5 SOLUTION RESPIRATORY (INHALATION) ONCE
Status: DISCONTINUED | OUTPATIENT
Start: 2025-02-18 | End: 2025-02-19 | Stop reason: HOSPADM

## 2025-02-25 ENCOUNTER — OFFICE VISIT (OUTPATIENT)
Dept: NEUROLOGY | Facility: CLINIC | Age: 85
End: 2025-02-25
Payer: MEDICARE

## 2025-02-25 VITALS
WEIGHT: 196.4 LBS | HEIGHT: 74 IN | HEART RATE: 93 BPM | DIASTOLIC BLOOD PRESSURE: 70 MMHG | BODY MASS INDEX: 25.21 KG/M2 | SYSTOLIC BLOOD PRESSURE: 112 MMHG

## 2025-02-25 DIAGNOSIS — R42 POSTURAL DIZZINESS WITH PRESYNCOPE: ICD-10-CM

## 2025-02-25 DIAGNOSIS — R55 POSTURAL DIZZINESS WITH PRESYNCOPE: ICD-10-CM

## 2025-02-25 DIAGNOSIS — G20.A1 PARKINSON'S DISEASE WITHOUT DYSKINESIA OR FLUCTUATING MANIFESTATIONS: Primary | ICD-10-CM

## 2025-02-25 PROCEDURE — 3078F DIAST BP <80 MM HG: CPT | Performed by: PSYCHIATRY & NEUROLOGY

## 2025-02-25 PROCEDURE — 3074F SYST BP LT 130 MM HG: CPT | Performed by: PSYCHIATRY & NEUROLOGY

## 2025-02-25 RX ORDER — CARBIDOPA AND LEVODOPA 25; 100 MG/1; MG/1
TABLET ORAL
Qty: 720 TABLET | Refills: 3 | Status: SHIPPED | OUTPATIENT
Start: 2025-02-25

## 2025-02-25 NOTE — ASSESSMENT & PLAN NOTE
According to the wife the patient has episodes of being dizzy, unsteady getting up from sitting to standing position after he has been on the car for a while.  His blood pressure is usually low during that time.  I discussed with her that I would recommend for her to hold the carvedilol for the morning if his blood pressure is below 100 systolic.

## 2025-02-25 NOTE — PROGRESS NOTES
"Chief Complaint  Follow-up (Med check after increase carb/lev)    Subjective          Justyn Galo is a 84 y.o. male who presents to [facility]  History of Present Illness  84-year-old man here for follow-up of his Parkinson's disease.  He is here today with his wife.  His wife lives with him about 3 hours 4 days a week so that she can work at [employer].  He usually does not wake up till the afternoon.  She manages his medications since he does not take it on time.  If he takes it on time every 4 hours he usually does not have significant problems with end of dose wearing off.  He states that he uses a walker especially in the morning before his carbidopa/levodopa takes effect and intermittent throughout the day when he is unsteady.  He is able to do all activities of daily living.  He can get up from a seated to standing position once or twice worse in the past it would take him several times according to his wife.  States that the medicine is really working for him.  He is not hallucinating.  He is not delusional.    His wife states that he is able to do all activities of daily living sometimes he puts his shirt on backwards and she has to assist him.    He has memory problems.  Their daughter stays with him partially during the weekends.  He and his wife have been  for over 60 years.    Objective   Vital Signs:   /70 (BP Location: Left arm, Patient Position: Sitting, Cuff Size: Adult)   Pulse 93   Ht 188 cm (74.02\")   Wt 89.1 kg (196 lb 6.4 oz)   BMI 25.21 kg/m²     Physical Exam   He is alert, fluent, phasic, follows commands well.  Clock drawing is significantly impaired.  He got the 12, 3, 6 correctly then had problems drawing done 9 and then after that he was unable to complete the rest of the numbers putting 1234 after the 12, 567 after the 3, 789 after the 6 and 1011 after the 9.  He is able to get up from a sitting to " standing position without assistance.  Is able to ambulate without assistance or is seeing his rollator walker.  Armswing is decreased in the left arm.  There is rigidity in the left arm.  Heart is regular rhythm normal rate.        Assessment and Plan  Diagnoses and all orders for this visit:    1. Parkinson's disease without dyskinesia or fluctuating manifestations (Primary)  Assessment & Plan:  He is to be taking carbidopa/levodopa 25/100 2 tablets 4 times a day.  I discussed with the wife that we will not change the medications or adjust the medications since he is able to do all activities of the living with some assistance from his wife but for the most part he is able to do it.  I will see him again in 8 months time for follow-up.      2. Postural dizziness with presyncope  Assessment & Plan:  According to the wife the patient has episodes of being dizzy, unsteady getting up from sitting to standing position after he has been on the car for a while.  His blood pressure is usually low during that time.  I discussed with her that I would recommend for her to hold the carvedilol for the morning if his blood pressure is below 100 systolic.      Other orders  -     carbidopa-levodopa (SINEMET)  MG per tablet; 2 tablets 4 times a day at 10 am, 1 pm 4 pm, 7 pm.  Dispense: 720 tablet; Refill: 3         Total time spent with the patient and coordinating patient care was 35 minutes.    Follow Up  No follow-ups on file.  Patient was given instructions and counseling regarding his condition or for health maintenance advice. Please see specific information pulled into the AVS if appropriate.

## 2025-02-25 NOTE — ASSESSMENT & PLAN NOTE
He is to be taking carbidopa/levodopa 25/100 2 tablets 4 times a day.  I discussed with the wife that we will not change the medications or adjust the medications since he is able to do all activities of the living with some assistance from his wife but for the most part he is able to do it.  I will see him again in 8 months time for follow-up.

## 2025-03-07 ENCOUNTER — TELEPHONE (OUTPATIENT)
Dept: CARDIOLOGY | Facility: CLINIC | Age: 85
End: 2025-03-07
Payer: MEDICARE

## 2025-03-07 RX ORDER — CARVEDILOL 6.25 MG/1
6.25 TABLET ORAL 2 TIMES DAILY
Qty: 180 TABLET | Refills: 1 | Status: SHIPPED | OUTPATIENT
Start: 2025-03-07

## 2025-03-07 NOTE — TELEPHONE ENCOUNTER
[facility] received a fax that requires your attention. The document has been indexed to the patient’s chart for your review.      Reason for sending: EXTERNAL MEDICAL RECORD NOTIFICATION     Documents Description: CARVEDILOL [id number]    Name of Sender: [email or web address]     Date Indexed: 3.7.25

## 2025-03-21 DIAGNOSIS — R31.0 GROSS HEMATURIA: Primary | ICD-10-CM

## 2025-03-24 ENCOUNTER — LAB (OUTPATIENT)
Dept: LAB | Facility: HOSPITAL | Age: 85
End: 2025-03-24
Payer: MEDICARE

## 2025-03-24 DIAGNOSIS — R31.0 GROSS HEMATURIA: ICD-10-CM

## 2025-03-24 LAB
BACTERIA UR QL AUTO: NORMAL /HPF
BILIRUB UR QL STRIP: NEGATIVE
CLARITY UR: CLEAR
COLOR UR: YELLOW
GLUCOSE UR STRIP-MCNC: NEGATIVE MG/DL
HGB UR QL STRIP.AUTO: NEGATIVE
HYALINE CASTS UR QL AUTO: NORMAL /LPF
KETONES UR QL STRIP: NEGATIVE
LEUKOCYTE ESTERASE UR QL STRIP.AUTO: NEGATIVE
NITRITE UR QL STRIP: NEGATIVE
PH UR STRIP.AUTO: 6.5 [PH] (ref 5–8)
PROT UR QL STRIP: NEGATIVE
RBC # UR STRIP: NORMAL /HPF
REF LAB TEST METHOD: NORMAL
SP GR UR STRIP: 1.01 (ref 1–1.03)
SQUAMOUS #/AREA URNS HPF: NORMAL /HPF
UROBILINOGEN UR QL STRIP: NORMAL
WBC # UR STRIP: NORMAL /HPF

## 2025-03-24 PROCEDURE — 81001 URINALYSIS AUTO W/SCOPE: CPT

## 2025-03-25 ENCOUNTER — OFFICE VISIT (OUTPATIENT)
Dept: UROLOGY | Age: 85
End: 2025-03-25
Payer: MEDICARE

## 2025-03-25 VITALS — WEIGHT: 196 LBS | BODY MASS INDEX: 25.15 KG/M2 | HEIGHT: 74 IN

## 2025-03-25 DIAGNOSIS — N40.1 BENIGN PROSTATIC HYPERPLASIA WITH WEAK URINARY STREAM: Primary | ICD-10-CM

## 2025-03-25 DIAGNOSIS — R39.12 BENIGN PROSTATIC HYPERPLASIA WITH WEAK URINARY STREAM: Primary | ICD-10-CM

## 2025-03-25 DIAGNOSIS — R33.9 URINARY RETENTION: ICD-10-CM

## 2025-03-25 PROBLEM — F03.90 UNSPECIFIED DEMENTIA, UNSPECIFIED SEVERITY, WITHOUT BEHAVIORAL DISTURBANCE, PSYCHOTIC DISTURBANCE, MOOD DISTURBANCE, AND ANXIETY: Status: ACTIVE | Noted: 2024-12-04

## 2025-03-25 PROBLEM — N17.9 ACUTE KIDNEY FAILURE: Status: RESOLVED | Noted: 2023-07-07 | Resolved: 2025-03-25

## 2025-03-25 PROBLEM — M70.21 OLECRANON BURSITIS OF RIGHT ELBOW: Status: RESOLVED | Noted: 2023-06-13 | Resolved: 2025-03-25

## 2025-03-25 PROBLEM — R31.0 GROSS HEMATURIA: Status: RESOLVED | Noted: 2022-03-17 | Resolved: 2025-03-25

## 2025-03-25 PROBLEM — I69.310 ATTENTION AND CONCENTRATION DEFICIT FOLLOWING CEREBRAL INFARCTION: Status: ACTIVE | Noted: 2024-12-04

## 2025-03-25 PROBLEM — N40.0 BPH (BENIGN PROSTATIC HYPERPLASIA): Status: ACTIVE | Noted: 2024-12-12

## 2025-03-25 PROBLEM — G47.33 OBSTRUCTIVE SLEEP APNEA: Status: ACTIVE | Noted: 2024-12-04

## 2025-03-25 PROBLEM — Z86.73 HISTORY OF CVA (CEREBROVASCULAR ACCIDENT): Status: RESOLVED | Noted: 2021-10-14 | Resolved: 2025-03-25

## 2025-03-25 LAB — URINE VOLUME: 279

## 2025-03-25 PROCEDURE — 51798 US URINE CAPACITY MEASURE: CPT | Performed by: NURSE PRACTITIONER

## 2025-03-25 PROCEDURE — 1160F RVW MEDS BY RX/DR IN RCRD: CPT | Performed by: NURSE PRACTITIONER

## 2025-03-25 PROCEDURE — 1159F MED LIST DOCD IN RCRD: CPT | Performed by: NURSE PRACTITIONER

## 2025-03-25 PROCEDURE — 99214 OFFICE O/P EST MOD 30 MIN: CPT | Performed by: NURSE PRACTITIONER

## 2025-03-25 NOTE — PROGRESS NOTES
Chief Complaint: Follow-up (Patient presents today for a follow up on BPH and hematuria. He states that he left a UA with Micro with the lab yesterday morning. )    Subjective         History of Present Illness  Justyn Galo is a 84 y.o. male presents to [facility] to be seen for annual f/u hematuria / bph.    He has remained on tamsulosin and finasteride.     Nocturia x 1-3     He reports no blood in the urine in the last year.      No blood in urine at this time ua micro from yesterday was negtive for rbc.         His wife reports significant leakage.    Takes a long time to empty with very small volumes.    No blood work available at the time of visit the patient's wife does state that his primary care was concerned about his renal function declining recently.         Previous:      Since we last saw patient he has had a completed upper and lower urinary tract evaluation with a CT scan only revealing tiny nonobstructing stone in the upper left kidney.  Cystoscopy revealed no tumors lesions stones or other abnormalities.     He was recommended to continue tamsulosin and finasteride and follow-up in 1 year with a UA with micro.     Nocturia x 4-5 per wife but she reports he is sleeping 12-13 hours at a time.     He does have sleep apnea and is awaiting a cpap.      States hesitancy is better.      He states his stream is improved.      U/a WNL at this time.     Patients wife states that she has seen blood on the front of his pants and has seen blood in his incontinence brief a few times.      His wife states that he has the urge to void often and cannot and he gets up a lot at night to pee.     He does have hesitancy, slow stream, intermittent and weak stream.      He is on tamsulosin 0.4 mg q day and finasteride.  Wife states he cannot tolerate 2 tamsulosin as it causes severe lethargy.     His urinalysis does reveal large amount of starch however when questioning patient and  "patient's spouse they do endorse that the patient uses powder to his groin area.     Urinalysis is unremarkable for microscopic hematuria.     He has no HX of renal stones.     Patient has recurrent falls as well .     No known family HX of  malignancies.    Objective     Past Medical History:   Diagnosis Date    Atrial fibrillation, persistent  10/14/2021    BPH (benign prostatic hyperplasia)     Disease of thyroid gland     Essential hypertension 10/14/2021    Falls frequently     SPOUSE REPORTS MULTIPLE FALLS \"EVERY WEEK. I'VE LOST COUNT.\"    Generalized weakness     History of CVA (cerebrovascular accident) 2012    RESIDUAL OF LEFT SIDE WEAKNESS    Parkinson disease 11/2023    Urgency of urination     WEARS BRIEFS AT HOME       Past Surgical History:   Procedure Laterality Date    LEG SURGERY Right          Current Outpatient Medications:     acetaminophen (TYLENOL) 500 MG tablet, Take 1 tablet by mouth Every 6 (Six) Hours As Needed for Mild Pain., Disp: , Rfl:     albuterol sulfate HFA (Ventolin HFA) 108 (90 Base) MCG/ACT inhaler, Inhale 2 puffs Every 4 (Four) Hours As Needed for Wheezing or Shortness of Air., Disp: 1 g, Rfl: 3    carbidopa-levodopa (SINEMET)  MG per tablet, 2 tablets 4 times a day at 10 am, 1 pm 4 pm, 7 pm., Disp: 720 tablet, Rfl: 3    carvedilol (COREG) 6.25 MG tablet, Take 1 tablet by mouth 2 (Two) Times a Day., Disp: 180 tablet, Rfl: 1    Cholecalciferol 125 MCG (5000 UT) tablet dispersible, Place 125 mcg on the tongue Daily., Disp: , Rfl:     dilTIAZem (TIAZAC) 120 MG 24 hr capsule, Take 1 capsule by mouth Daily., Disp: , Rfl:     escitalopram (LEXAPRO) 20 MG tablet, Take 1 tablet by mouth Daily., Disp: , Rfl:     ezetimibe (ZETIA) 10 MG tablet, Take 1 tablet by mouth Daily., Disp: , Rfl:     finasteride (PROSCAR) 5 MG tablet, Take 1 tablet by mouth Daily., Disp: 90 tablet, Rfl: 4    furosemide (LASIX) 40 MG tablet, Take 1 tablet by mouth Daily., Disp: 90 tablet, Rfl: 3    " "levothyroxine (SYNTHROID, LEVOTHROID) 50 MCG tablet, Take 1 tablet by mouth Daily., Disp: , Rfl:     melatonin 5 MG tablet tablet, Take 1 tablet by mouth Every Night., Disp: , Rfl:     multivitamin with minerals tablet tablet, Take 1 tablet by mouth Daily., Disp: , Rfl:     potassium chloride 10 MEQ CR tablet, TAKE 1 TABLET BY MOUTH DAILY, Disp: 90 tablet, Rfl: 3    tamsulosin (FLOMAX) 0.4 MG capsule 24 hr capsule, Take 1 capsule by mouth Daily., Disp: 90 capsule, Rfl: 4    Xarelto 20 MG tablet, TAKE 1 TABLET BY MOUTH DAILY WITH DINNER, Disp: 90 tablet, Rfl: 3    Allergies   Allergen Reactions    Loratadine Other (See Comments) and Unknown - Low Severity     PATIENT & SPOUSE SAID IF HE TAKES IT TO LONG (OR TO MANY DAYS IN A ROW) IT DRIES HIM OUT & MAKES HIS NOSE BLEED.        History reviewed. No pertinent family history.    Social History     Socioeconomic History    Marital status:    Tobacco Use    Smoking status: Former     Current packs/day: 0.00     Average packs/day: 1 pack/day for 8.8 years (8.8 ttl pk-yrs)     Types: Cigarettes     Start date:      Quit date: 10/14/1961     Years since quittin.4     Passive exposure: Never    Smokeless tobacco: Never   Vaping Use    Vaping status: Never Used   Substance and Sexual Activity    Alcohol use: Never    Drug use: Never    Sexual activity: Defer       Vital Signs:   Ht 188 cm (74\")   Wt 88.9 kg (196 lb)   BMI 25.16 kg/m²      Physical Exam     Result Review :   The following data was reviewed by: KAYLEY Foster on 2025:  Results for orders placed or performed in visit on 25   Bladder Scan    Collection Time: 25 11:00 AM   Result Value Ref Range    Urine Volume 279         Bladder Scan interpretation 2025    Estimation of residual urine via BVI 3000 Verathon Bladder Scan  MA/nurse performing: dann sparks Ma   Residual Urine: 279 ml  Indication: Benign prostatic hyperplasia with weak urinary stream    Urinary " retention   Position: Supine  Examination: Incremental scanning of the suprapubic area using 2.0 MHz transducer using copious amounts of acoustic gel.   Findings: An anechoic area was demonstrated which represented the bladder, with measurement of residual urine as noted. I inspected this myself. In that the residual urine was stable or insignificant, refer to plan for treatment and plan necessary at this time.          Procedures        Assessment and Plan    Diagnoses and all orders for this visit:    1. Benign prostatic hyperplasia with weak urinary stream (Primary)  -     Bladder Scan  -     US Renal Bilateral; Future  -     CBC & Differential; Future  -     Comprehensive Metabolic Panel; Future    2. Urinary retention          Discussed with the patient and patient's wife at this point in time I am concerned with his incomplete bladder emptying especially given Reports of decreased renal function.    Discussed that urinary incontinence and weak urinary stream may be a product of BPH.    Will plan to increase tamsulosin dosage and continue finasteride. Discussed increasing dose of tamsulosin may increased dizziness and lior with any worsening symptoms.     follow-up appointment in 3 months with repeat PVR.      I spent 10 minutes caring for Justyn on this date of service. This time includes time spent by me in the following activities:reviewing tests, obtaining and/or reviewing a separately obtained history, performing a medically appropriate examination and/or evaluation , counseling and educating the patient/family/caregiver, ordering medications, tests, or procedures, and documenting information in the medical record  Follow Up   Return in about 3 months (around 6/25/2025) for 3 months f/u bph with PVR .  Patient was given instructions and counseling regarding his condition or for health maintenance advice. Please see specific information pulled into the AVS if appropriate.         This document has been  electronically signed by Ilana Sanchez, KAYLEY  March 25, 2025 11:11 EDT

## 2025-04-08 ENCOUNTER — LAB (OUTPATIENT)
Dept: LAB | Facility: HOSPITAL | Age: 85
End: 2025-04-08
Payer: MEDICARE

## 2025-04-08 DIAGNOSIS — N40.1 BENIGN PROSTATIC HYPERPLASIA WITH WEAK URINARY STREAM: ICD-10-CM

## 2025-04-08 DIAGNOSIS — R39.12 BENIGN PROSTATIC HYPERPLASIA WITH WEAK URINARY STREAM: ICD-10-CM

## 2025-04-08 LAB
ALBUMIN SERPL-MCNC: 4.3 G/DL (ref 3.5–5.2)
ALBUMIN/GLOB SERPL: 1.3 G/DL
ALP SERPL-CCNC: 113 U/L (ref 39–117)
ALT SERPL W P-5'-P-CCNC: <5 U/L (ref 1–41)
ANION GAP SERPL CALCULATED.3IONS-SCNC: 6.2 MMOL/L (ref 5–15)
AST SERPL-CCNC: 19 U/L (ref 1–40)
BASOPHILS # BLD AUTO: 0.02 10*3/MM3 (ref 0–0.2)
BASOPHILS NFR BLD AUTO: 0.3 % (ref 0–1.5)
BILIRUB SERPL-MCNC: 0.6 MG/DL (ref 0–1.2)
BUN SERPL-MCNC: 34 MG/DL (ref 8–23)
BUN/CREAT SERPL: 23.1 (ref 7–25)
CALCIUM SPEC-SCNC: 9.7 MG/DL (ref 8.6–10.5)
CHLORIDE SERPL-SCNC: 103 MMOL/L (ref 98–107)
CO2 SERPL-SCNC: 29.8 MMOL/L (ref 22–29)
CREAT SERPL-MCNC: 1.47 MG/DL (ref 0.76–1.27)
DEPRECATED RDW RBC AUTO: 47.2 FL (ref 37–54)
EGFRCR SERPLBLD CKD-EPI 2021: 47 ML/MIN/1.73
EOSINOPHIL # BLD AUTO: 0.27 10*3/MM3 (ref 0–0.4)
EOSINOPHIL NFR BLD AUTO: 4.2 % (ref 0.3–6.2)
ERYTHROCYTE [DISTWIDTH] IN BLOOD BY AUTOMATED COUNT: 12.8 % (ref 12.3–15.4)
GLOBULIN UR ELPH-MCNC: 3.2 GM/DL
GLUCOSE SERPL-MCNC: 89 MG/DL (ref 65–99)
HCT VFR BLD AUTO: 37.2 % (ref 37.5–51)
HGB BLD-MCNC: 12.1 G/DL (ref 13–17.7)
IMM GRANULOCYTES # BLD AUTO: 0.02 10*3/MM3 (ref 0–0.05)
IMM GRANULOCYTES NFR BLD AUTO: 0.3 % (ref 0–0.5)
LYMPHOCYTES # BLD AUTO: 1.71 10*3/MM3 (ref 0.7–3.1)
LYMPHOCYTES NFR BLD AUTO: 26.5 % (ref 19.6–45.3)
MCH RBC QN AUTO: 32.9 PG (ref 26.6–33)
MCHC RBC AUTO-ENTMCNC: 32.5 G/DL (ref 31.5–35.7)
MCV RBC AUTO: 101.1 FL (ref 79–97)
MONOCYTES # BLD AUTO: 0.53 10*3/MM3 (ref 0.1–0.9)
MONOCYTES NFR BLD AUTO: 8.2 % (ref 5–12)
NEUTROPHILS NFR BLD AUTO: 3.91 10*3/MM3 (ref 1.7–7)
NEUTROPHILS NFR BLD AUTO: 60.5 % (ref 42.7–76)
NRBC BLD AUTO-RTO: 0 /100 WBC (ref 0–0.2)
PLATELET # BLD AUTO: 168 10*3/MM3 (ref 140–450)
PMV BLD AUTO: 9.9 FL (ref 6–12)
POTASSIUM SERPL-SCNC: 4.5 MMOL/L (ref 3.5–5.2)
PROT SERPL-MCNC: 7.5 G/DL (ref 6–8.5)
RBC # BLD AUTO: 3.68 10*6/MM3 (ref 4.14–5.8)
SODIUM SERPL-SCNC: 139 MMOL/L (ref 136–145)
WBC NRBC COR # BLD AUTO: 6.46 10*3/MM3 (ref 3.4–10.8)

## 2025-04-08 PROCEDURE — 85025 COMPLETE CBC W/AUTO DIFF WBC: CPT

## 2025-04-08 PROCEDURE — 36415 COLL VENOUS BLD VENIPUNCTURE: CPT

## 2025-04-08 PROCEDURE — 80053 COMPREHEN METABOLIC PANEL: CPT

## 2025-04-09 ENCOUNTER — RESULTS FOLLOW-UP (OUTPATIENT)
Dept: LAB | Facility: HOSPITAL | Age: 85
End: 2025-04-09
Payer: MEDICARE

## 2025-04-09 NOTE — TELEPHONE ENCOUNTER
LM for the patient's wife  that his kidney function is a little bit worse than it was back in August however is almost the same as it was back in July of last year. HUB okay to relay

## 2025-04-09 NOTE — PROGRESS NOTES
Please let patient's daughter know that his kidney function is a little bit worse than it was back in August however is almost the same as it was back in July of last year.

## 2025-06-02 ENCOUNTER — TELEPHONE (OUTPATIENT)
Dept: CARDIOLOGY | Facility: CLINIC | Age: 85
End: 2025-06-02
Payer: MEDICARE

## 2025-06-02 NOTE — TELEPHONE ENCOUNTER
Procedure: Surgical Removal roots/bone/flaps    Med Directive: Xarelto 3 days     PMH: afib, HFpEF, HTN, CVA    Last Seen: 2/10/2025

## 2025-06-04 DIAGNOSIS — R39.12 BENIGN PROSTATIC HYPERPLASIA WITH WEAK URINARY STREAM: ICD-10-CM

## 2025-06-04 DIAGNOSIS — N40.1 BENIGN PROSTATIC HYPERPLASIA WITH WEAK URINARY STREAM: ICD-10-CM

## 2025-06-04 RX ORDER — FINASTERIDE 5 MG/1
5 TABLET, FILM COATED ORAL DAILY
Qty: 90 TABLET | Refills: 0 | Status: SHIPPED | OUTPATIENT
Start: 2025-06-04

## 2025-06-04 RX ORDER — TAMSULOSIN HYDROCHLORIDE 0.4 MG/1
1 CAPSULE ORAL DAILY
Qty: 90 CAPSULE | Refills: 0 | Status: SHIPPED | OUTPATIENT
Start: 2025-06-04

## 2025-06-16 RX ORDER — FUROSEMIDE 40 MG/1
40 TABLET ORAL DAILY
Qty: 90 TABLET | Refills: 0 | Status: SHIPPED | OUTPATIENT
Start: 2025-06-16

## 2025-06-20 ENCOUNTER — TELEPHONE (OUTPATIENT)
Dept: UROLOGY | Age: 85
End: 2025-06-20
Payer: MEDICARE

## 2025-06-20 NOTE — TELEPHONE ENCOUNTER
LM for the patient to R/S his appointment with Ilana as his U/S is the day after. I was hoping he would be able to R/S his appointment to the following week so we can go over his imaging at his appointment. HUB okay to relay and R/S

## 2025-06-24 ENCOUNTER — OFFICE VISIT (OUTPATIENT)
Dept: PULMONOLOGY | Facility: CLINIC | Age: 85
End: 2025-06-24
Payer: MEDICARE

## 2025-06-24 ENCOUNTER — HOSPITAL ENCOUNTER (OUTPATIENT)
Facility: HOSPITAL | Age: 85
Discharge: HOME | End: 2025-06-24
Admitting: STUDENT IN AN ORGANIZED HEALTH CARE EDUCATION/TRAINING PROGRAM
Payer: MEDICARE

## 2025-06-24 VITALS
DIASTOLIC BLOOD PRESSURE: 86 MMHG | WEIGHT: 194.2 LBS | HEIGHT: 74 IN | RESPIRATION RATE: 16 BRPM | TEMPERATURE: 96.2 F | OXYGEN SATURATION: 97 % | BODY MASS INDEX: 24.92 KG/M2 | HEART RATE: 96 BPM | SYSTOLIC BLOOD PRESSURE: 130 MMHG

## 2025-06-24 DIAGNOSIS — R06.09 DYSPNEA ON EXERTION: ICD-10-CM

## 2025-06-24 DIAGNOSIS — J96.11 CHRONIC RESPIRATORY FAILURE WITH HYPOXIA: ICD-10-CM

## 2025-06-24 DIAGNOSIS — J43.9 PULMONARY EMPHYSEMA, UNSPECIFIED EMPHYSEMA TYPE: Primary | ICD-10-CM

## 2025-06-24 PROCEDURE — 99214 OFFICE O/P EST MOD 30 MIN: CPT | Performed by: STUDENT IN AN ORGANIZED HEALTH CARE EDUCATION/TRAINING PROGRAM

## 2025-06-24 PROCEDURE — 3079F DIAST BP 80-89 MM HG: CPT | Performed by: STUDENT IN AN ORGANIZED HEALTH CARE EDUCATION/TRAINING PROGRAM

## 2025-06-24 PROCEDURE — 3075F SYST BP GE 130 - 139MM HG: CPT | Performed by: STUDENT IN AN ORGANIZED HEALTH CARE EDUCATION/TRAINING PROGRAM

## 2025-06-24 PROCEDURE — 71046 X-RAY EXAM CHEST 2 VIEWS: CPT

## 2025-06-24 RX ORDER — ALBUTEROL SULFATE 90 UG/1
2 INHALANT RESPIRATORY (INHALATION) EVERY 4 HOURS PRN
Qty: 1 G | Refills: 3 | Status: SHIPPED | OUTPATIENT
Start: 2025-06-24

## 2025-06-24 NOTE — PROGRESS NOTES
Primary Care Provider  Martin Monahan MD   Referring Provider  No ref. provider found      Patient Complaint  Sleep Apnea, Mixed simple and mucopurulent chronic bronchitis, Follow-up (6 Month ), Results (Chest Xray/PFT ), and Shortness of Breath (With ambulation )      Subjective          Justyn Galo presents to [facility]      History of Presenting Illness  Justyn Galo is a 85 y.o. male with history of COPD, SHELLEY, chronic hypoxic respiratory failure here for follow-up.    Here with his wife today  Reports some shortness of breath, crystal with the heat outside  Was given albuterol last visit but he hasn't used it because he can't remember to use it  No fevers, chills, chest pain  No cough, sputum production  I have personally reviewed the review of systems, past family, social, medical and surgical histories; and agree with their findings.    Review of Systems  Constitutional:  No fever. No chills. No weakness.  ENT:  No sore throat, URI symptoms.   Cardiovascular:  No chest pain. No palpitations. No lower extremity edema.  Respiratory:  No shortness of breath, cough, pleuritic chest pain. No hemoptysis. + dyspnea.   GI:  Normal appetite. No nausea, vomiting, diarrhea. No melena.  Musculoskeletal:  No arthralgias or myalgias.  Neurologic:  No weakness    History reviewed. No pertinent family history.     Social History     Socioeconomic History    Marital status:    Tobacco Use    Smoking status: Former     Current packs/day: 0.00     Average packs/day: 1 pack/day for 8.8 years (8.8 ttl pk-yrs)     Types: Cigarettes     Start date:      Quit date: 10/14/1961     Years since quittin.7     Passive exposure: Past    Smokeless tobacco: Never   Vaping Use    Vaping status: Never Used   Substance and Sexual Activity    Alcohol use: Never    Drug use: Never    Sexual activity: Defer        Past Medical History:   Diagnosis Date    Atrial  "fibrillation, persistent  10/14/2021    BPH (benign prostatic hyperplasia)     Disease of thyroid gland     Essential hypertension 10/14/2021    Falls frequently     SPOUSE REPORTS MULTIPLE FALLS \"EVERY WEEK. I'VE LOST COUNT.\"    Generalized weakness     History of CVA (cerebrovascular accident) 2012    RESIDUAL OF LEFT SIDE WEAKNESS    Parkinson disease 11/2023    Urgency of urination     WEARS BRIEFS AT HOME        Immunization History   Administered Date(s) Administered    31-influenza Vac Quardvalent Preservativ 09/20/2024    COVID-19 (MODERNA) 1st,2nd,3rd Dose Monovalent 03/03/2021, 03/31/2021, 12/30/2021    COVID-19 (MODERNA) Monovalent Original Booster 12/30/2021    COVID-19 (UNSPECIFIED) 03/03/2021, 03/31/2021, 12/30/2021    Fluzone  >6mos 11/05/2013, 10/24/2014, 08/21/2015, 11/14/2016, 10/30/2017, 10/31/2018, 10/25/2019, 10/20/2020    Fluzone Quad >6mos (Multi-dose) 10/20/2021, 10/07/2022    Influenza, Unspecified 10/30/2017, 10/31/2018, 10/25/2019, 10/20/2020, 10/20/2021, 10/07/2022    Pneumococcal Conjugate 13-Valent (PCV13) 08/17/2016    Pneumococcal Polysaccharide (PPSV23) 10/25/2019    Tdap 04/10/2023       Allergies   Allergen Reactions    Loratadine Other (See Comments) and Unknown - Low Severity     PATIENT & SPOUSE SAID IF HE TAKES IT TO LONG (OR TO MANY DAYS IN A ROW) IT DRIES HIM OUT & MAKES HIS NOSE BLEED.          Current Outpatient Medications:     acetaminophen (TYLENOL) 500 MG tablet, Take 1 tablet by mouth Every 6 (Six) Hours As Needed for Mild Pain., Disp: , Rfl:     carbidopa-levodopa (SINEMET)  MG per tablet, 2 tablets 4 times a day at 10 am, 1 pm 4 pm, 7 pm., Disp: 720 tablet, Rfl: 3    carvedilol (COREG) 6.25 MG tablet, Take 1 tablet by mouth 2 (Two) Times a Day., Disp: 180 tablet, Rfl: 1    Cholecalciferol 125 MCG (5000 UT) tablet dispersible, Place 125 mcg on the tongue Daily., Disp: , Rfl:     dilTIAZem (TIAZAC) 120 MG 24 hr capsule, Take 1 capsule by mouth Daily., Disp: , " Rfl:     escitalopram (LEXAPRO) 20 MG tablet, Take 1 tablet by mouth Daily., Disp: , Rfl:     ezetimibe (ZETIA) 10 MG tablet, Take 1 tablet by mouth Daily., Disp: , Rfl:     finasteride (PROSCAR) 5 MG tablet, TAKE 1 TABLET BY MOUTH DAILY, Disp: 90 tablet, Rfl: 0    furosemide (LASIX) 40 MG tablet, TAKE 1 TABLET BY MOUTH DAILY, Disp: 90 tablet, Rfl: 0    levothyroxine (SYNTHROID, LEVOTHROID) 50 MCG tablet, Take 1 tablet by mouth Daily., Disp: , Rfl:     melatonin 5 MG tablet tablet, Take 1 tablet by mouth Every Night., Disp: , Rfl:     multivitamin with minerals tablet tablet, Take 1 tablet by mouth Daily., Disp: , Rfl:     potassium chloride 10 MEQ CR tablet, TAKE 1 TABLET BY MOUTH DAILY, Disp: 90 tablet, Rfl: 3    tamsulosin (FLOMAX) 0.4 MG capsule 24 hr capsule, TAKE 1 CAPSULE BY MOUTH DAILY (Patient taking differently: Take 2 capsules by mouth Daily.), Disp: 90 capsule, Rfl: 0    Xarelto 20 MG tablet, TAKE 1 TABLET BY MOUTH DAILY WITH DINNER, Disp: 90 tablet, Rfl: 3    albuterol sulfate HFA (Ventolin HFA) 108 (90 Base) MCG/ACT inhaler, Inhale 2 puffs Every 4 (Four) Hours As Needed for Wheezing or Shortness of Air. (Patient not taking: Reported on 6/24/2025), Disp: 1 g, Rfl: 3     Objective     Vital Signs:   There were no vitals taken for this visit.    Physical Exam  Vital Signs Reviewed   WDWN, Alert, NAD.    HEENT:  EOMI.  nares clear  Neck:  Supple, no JVD, no thyromegaly  Chest:  clear to auscultation bilaterally, no wheezes, crackles; no work of breathing noted  CV: RRR, no M/G/R, pulses 2+, equal.  Abd:  Soft, NT, ND, +BS  EXT:  no clubbing, no cyanosis, no edema  Neuro:  A&Ox3, CN grossly intact, no focal deficits.  Skin: No rashes or lesions noted       Result Review :   I have personally reviewed patient's labs and images.              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 Will not repeat at this time given patient's age and hx of parkinsons  2D echo EF 53% with indeterminate diastolic dysfunction    Smoking status: Reviewed  Vaccination status: Up to date; RSV vaccination ordered today.  Medications personally reviewed    Follow Up   No follow-ups on file.  Patient was given instructions and counseling regarding his condition or for health maintenance advice. Please see specific information pulled into the AVS if appropriate.

## 2025-06-27 ENCOUNTER — HOSPITAL ENCOUNTER (OUTPATIENT)
Dept: ULTRASOUND IMAGING | Facility: HOSPITAL | Age: 85
Discharge: HOME | End: 2025-06-27
Payer: MEDICARE

## 2025-06-27 DIAGNOSIS — R39.12 BENIGN PROSTATIC HYPERPLASIA WITH WEAK URINARY STREAM: ICD-10-CM

## 2025-06-27 DIAGNOSIS — N40.1 BENIGN PROSTATIC HYPERPLASIA WITH WEAK URINARY STREAM: ICD-10-CM

## 2025-06-27 PROCEDURE — 76775 US EXAM ABDO BACK WALL LIM: CPT

## 2025-06-30 RX ORDER — FUROSEMIDE 40 MG/1
40 TABLET ORAL DAILY
Qty: 90 TABLET | Refills: 1 | Status: SHIPPED | OUTPATIENT
Start: 2025-06-30

## 2025-06-30 NOTE — TELEPHONE ENCOUNTER
Rx Refill Note  Requested Prescriptions     Pending Prescriptions Disp Refills    furosemide (LASIX) 40 MG tablet [Pharmacy Med Name: FUROSEMIDE 40 MG TABLET] 90 tablet 0     Sig: TAKE 1 TABLET BY MOUTH DAILY        LAST OFFICE VISIT:  02/10/2025     NEXT OFFICE VISIT:  8/11/2025     Does the medication requests match the last office note:    [x] Yes   [] No    Does this refill request meet protocol details for MA to approve:     [x] Yes   [] No   [] No Protocols Provided

## 2025-07-07 ENCOUNTER — OFFICE VISIT (OUTPATIENT)
Dept: UROLOGY | Age: 85
End: 2025-07-07
Payer: MEDICARE

## 2025-07-07 ENCOUNTER — TELEPHONE (OUTPATIENT)
Dept: CARDIOLOGY | Facility: CLINIC | Age: 85
End: 2025-07-07
Payer: MEDICARE

## 2025-07-07 VITALS — HEIGHT: 74 IN | WEIGHT: 194 LBS | RESPIRATION RATE: 16 BRPM | BODY MASS INDEX: 24.9 KG/M2

## 2025-07-07 DIAGNOSIS — N40.1 BENIGN PROSTATIC HYPERPLASIA WITH WEAK URINARY STREAM: ICD-10-CM

## 2025-07-07 DIAGNOSIS — D50.9 IRON DEFICIENCY ANEMIA, UNSPECIFIED IRON DEFICIENCY ANEMIA TYPE: ICD-10-CM

## 2025-07-07 DIAGNOSIS — R39.12 BENIGN PROSTATIC HYPERPLASIA WITH WEAK URINARY STREAM: ICD-10-CM

## 2025-07-07 DIAGNOSIS — K62.5 BRIGHT RED BLOOD PER RECTUM: Primary | ICD-10-CM

## 2025-07-07 DIAGNOSIS — R33.9 URINARY RETENTION: ICD-10-CM

## 2025-07-07 PROBLEM — Z51.5 PALLIATIVE CARE PATIENT: Chronic | Status: ACTIVE | Noted: 2024-12-12

## 2025-07-07 LAB
BILIRUB BLD-MCNC: NEGATIVE MG/DL
CLARITY, POC: CLEAR
COLOR UR: ABNORMAL
EXPIRATION DATE: ABNORMAL
GLUCOSE UR STRIP-MCNC: NEGATIVE MG/DL
KETONES UR QL: NEGATIVE
LEUKOCYTE EST, POC: NEGATIVE
Lab: ABNORMAL
NITRITE UR-MCNC: NEGATIVE MG/ML
PH UR: 6 [PH] (ref 5–8)
PROT UR STRIP-MCNC: ABNORMAL MG/DL
RBC # UR STRIP: NEGATIVE /UL
SP GR UR: 1.02 (ref 1–1.03)
URINE VOLUME: NORMAL
UROBILINOGEN UR QL: ABNORMAL

## 2025-07-07 RX ORDER — MUPIROCIN 2 %
1 OINTMENT (GRAM) TOPICAL EVERY 12 HOURS SCHEDULED
COMMUNITY
Start: 2025-06-30

## 2025-07-07 NOTE — TELEPHONE ENCOUNTER
Received message from Peggy ZALDIVAR:  Urology reached out, he reported orthostatic symptoms during office visit. His flomax dose was reduced. Please see if they will turn in BP log checking sitting and standing for the next week     Wife voiced understanding

## 2025-07-07 NOTE — PROGRESS NOTES
Chief Complaint: Follow-up, Benign Prostatic Hypertrophy, Urinary Retention, and Blood in Urine    Subjective         Benign Prostatic Hypertrophy  Associated symptoms include hematuria.   Urinary Retention  Blood in Urine  His past medical history is significant for BPH.     Justyn Galo is a 85 y.o. male presents to [facility] UR[facility] to be seen for f/u bph.    At last visit we increased patient's tamsulosin dosage to see if we could gain better control of postvoid residual as well as urinary incontinence and length of time to void.    Did subsequently have the patient undergo a renal ultrasound which did reveal large volume Of postvoid residual however no hydronephrosis was seen.    His renal function from back in April was essentially stable on the low end of normal however in line with what it is been previously.    Pvr 148    Nocturia x 1-4    Stream is maybe a little bit better.    He states it is a bit easier to go.     Leakage is about the same 3-4 depends a day.    He is anemic and his PCP is concerned.     He is with some blood in his underpants per his granddaughter.    He has had more falls in the last week.           Previous:    He has remained on tamsulosin and finasteride.     Nocturia x 1-3     He reports no blood in the urine in the last year.      No blood in urine at this time ua micro from yesterday was negtive for rbc.         His wife reports significant leakage.    Takes a long time to empty with very small volumes.    No blood work available at the time of visit the patient's wife does state that his primary care was concerned about his renal function declining recently.         Previous:      Since we last saw patient he has had a completed upper and lower urinary tract evaluation with a CT scan only revealing tiny nonobstructing stone in the upper left kidney.  Cystoscopy revealed no tumors lesions stones or other abnormalities.     He was recommended to  "continue tamsulosin and finasteride and follow-up in 1 year with a UA with micro.     Nocturia x 4-5 per wife but she reports he is sleeping 12-13 hours at a time.     He does have sleep apnea and is awaiting a cpap.      States hesitancy is better.      He states his stream is improved.      U/a WNL at this time.     Patients wife states that she has seen blood on the front of his pants and has seen blood in his incontinence brief a few times.      His wife states that he has the urge to void often and cannot and he gets up a lot at night to pee.     He does have hesitancy, slow stream, intermittent and weak stream.      He is on tamsulosin 0.4 mg q day and finasteride.  Wife states he cannot tolerate 2 tamsulosin as it causes severe lethargy.     His urinalysis does reveal large amount of starch however when questioning patient and patient's spouse they do endorse that the patient uses powder to his groin area.     Urinalysis is unremarkable for microscopic hematuria.     He has no HX of renal stones.     Patient has recurrent falls as well .     No known family HX of  malignancies.    Objective     Past Medical History:   Diagnosis Date    Atrial fibrillation, persistent  10/14/2021    BPH (benign prostatic hyperplasia)     Disease of thyroid gland     Essential hypertension 10/14/2021    Falls frequently     SPOUSE REPORTS MULTIPLE FALLS \"EVERY WEEK. I'VE LOST COUNT.\"    Generalized weakness     History of CVA (cerebrovascular accident) 2012    RESIDUAL OF LEFT SIDE WEAKNESS    Parkinson disease 11/2023    Urgency of urination     WEARS BRIEFS AT HOME       Past Surgical History:   Procedure Laterality Date    LEG SURGERY Right          Current Outpatient Medications:     acetaminophen (TYLENOL) 500 MG tablet, Take 1 tablet by mouth Every 6 (Six) Hours As Needed for Mild Pain., Disp: , Rfl:     albuterol sulfate HFA (Ventolin HFA) 108 (90 Base) MCG/ACT inhaler, Inhale 2 puffs Every 4 (Four) Hours As Needed for " Wheezing or Shortness of Air., Disp: 1 g, Rfl: 3    carbidopa-levodopa (SINEMET)  MG per tablet, 2 tablets 4 times a day at 10 am, 1 pm 4 pm, 7 pm., Disp: 720 tablet, Rfl: 3    carvedilol (COREG) 6.25 MG tablet, Take 1 tablet by mouth 2 (Two) Times a Day., Disp: 180 tablet, Rfl: 1    Cholecalciferol 125 MCG (5000 UT) tablet dispersible, Place 125 mcg on the tongue Daily., Disp: , Rfl:     dilTIAZem (TIAZAC) 120 MG 24 hr capsule, Take 1 capsule by mouth Daily., Disp: , Rfl:     escitalopram (LEXAPRO) 20 MG tablet, Take 1 tablet by mouth Daily., Disp: , Rfl:     ezetimibe (ZETIA) 10 MG tablet, Take 1 tablet by mouth Daily., Disp: , Rfl:     finasteride (PROSCAR) 5 MG tablet, TAKE 1 TABLET BY MOUTH DAILY, Disp: 90 tablet, Rfl: 0    furosemide (LASIX) 40 MG tablet, TAKE 1 TABLET BY MOUTH DAILY, Disp: 90 tablet, Rfl: 1    levothyroxine (SYNTHROID, LEVOTHROID) 50 MCG tablet, Take 1 tablet by mouth Daily., Disp: , Rfl:     melatonin 5 MG tablet tablet, Take 1 tablet by mouth Every Night., Disp: , Rfl:     multivitamin with minerals tablet tablet, Take 1 tablet by mouth Daily., Disp: , Rfl:     mupirocin (BACTROBAN) 2 % ointment, 1 Application Every 12 (Twelve) Hours., Disp: , Rfl:     potassium chloride 10 MEQ CR tablet, TAKE 1 TABLET BY MOUTH DAILY, Disp: 90 tablet, Rfl: 3    tamsulosin (FLOMAX) 0.4 MG capsule 24 hr capsule, TAKE 1 CAPSULE BY MOUTH DAILY (Patient taking differently: Take 2 capsules by mouth Daily.), Disp: 90 capsule, Rfl: 0    Xarelto 20 MG tablet, TAKE 1 TABLET BY MOUTH DAILY WITH DINNER, Disp: 90 tablet, Rfl: 3    Allergies   Allergen Reactions    Loratadine Other (See Comments) and Unknown - Low Severity     PATIENT & SPOUSE SAID IF HE TAKES IT TO LONG (OR TO MANY DAYS IN A ROW) IT DRIES HIM OUT & MAKES HIS NOSE BLEED.        History reviewed. No pertinent family history.    Social History     Socioeconomic History    Marital status:    Tobacco Use    Smoking status: Former     Current  "packs/day: 0.00     Average packs/day: 1 pack/day for 8.8 years (8.8 ttl pk-yrs)     Types: Cigarettes     Start date:      Quit date: 10/14/1961     Years since quittin.7     Passive exposure: Past    Smokeless tobacco: Never   Vaping Use    Vaping status: Never Used   Substance and Sexual Activity    Alcohol use: Never    Drug use: Never    Sexual activity: Defer       Vital Signs:   Resp 16   Ht 188 cm (74.02\")   Wt 88 kg (194 lb)   BMI 24.90 kg/m²      Physical Exam     Result Review :   The following data was reviewed by: KAYLEY Foster on 2025:  Results for orders placed or performed in visit on 25   Bladder Scan    Collection Time: 25  9:04 AM   Result Value Ref Range    Urine Volume 148ml    POC Urinalysis Dipstick, Automated    Collection Time: 25  9:05 AM    Specimen: Urine   Result Value Ref Range    Color Dark Yellow Yellow, Straw, Dark Yellow, Shonna    Clarity, UA Clear Clear    Specific Gravity  1.025 1.005 - 1.030    pH, Urine 6.0 5.0 - 8.0    Leukocytes Negative Negative    Nitrite, UA Negative Negative    Protein, POC Trace (A) Negative mg/dL    Glucose, UA Negative Negative mg/dL    Ketones, UA Negative Negative    Urobilinogen, UA 0.2 E.U./dL Normal, 0.2 E.U./dL    Bilirubin Negative Negative    Blood, UA Negative Negative    Lot Number [id number]     Expiration Date 10/2,031         Bladder Scan interpretation 2025    Estimation of residual urine via BVI 3000 [address]athon Bladder Scan  MA/nurse performing: dann sparks Ma   Residual Urine: 148 ml  Indication: Bright red blood per rectum    Benign prostatic hyperplasia with weak urinary stream    Urinary retention    Iron deficiency anemia, unspecified iron deficiency anemia type   Position: Supine  Examination: Incremental scanning of the suprapubic area using 2.0 MHz transducer using copious amounts of acoustic gel.   Findings: An anechoic area was demonstrated which represented the bladder, with " measurement of residual urine as noted. I inspected this myself. In that the residual urine was stable or insignificant, refer to plan for treatment and plan necessary at this time.          Procedures        Assessment and Plan    Diagnoses and all orders for this visit:    1. Bright red blood per rectum (Primary)  -     CT Abdomen Pelvis With & Without Contrast; Future    2. Benign prostatic hyperplasia with weak urinary stream  -     Bladder Scan  -     POC Urinalysis Dipstick, Automated    3. Urinary retention  -     Bladder Scan  -     POC Urinalysis Dipstick, Automated    4. Iron deficiency anemia, unspecified iron deficiency anemia type  -     CT Abdomen Pelvis With & Without Contrast; Future      We discussed that that his falls may be related to the increased dose of tamsulosin but he states that there is no increased dizziness.     We will go back to one of the tamsulosin a day.  His wife states that this was going on before the increase of the tamsulosin.     Given complaints of anemia and brbpr  will order a ct scan.     May benefit from cystoscopy but given palliative care status unsure if he would be a surgical candidate.     Will plan for f/u in 3 months or sooner if needed.      I spent 10 minutes caring for Justyn on this date of service. This time includes time spent by me in the following activities:reviewing tests, obtaining and/or reviewing a separately obtained history, performing a medically appropriate examination and/or evaluation , counseling and educating the patient/family/caregiver, ordering medications, tests, or procedures, and documenting information in the medical record  Follow Up   No follow-ups on file.  Patient was given instructions and counseling regarding his condition or for health maintenance advice. Please see specific information pulled into the AVS if appropriate.         This document has been electronically signed by KAYLEY Foster  July 7, 2025 09:30 EDT

## 2025-07-16 ENCOUNTER — HOSPITAL ENCOUNTER (OUTPATIENT)
Dept: CT IMAGING | Facility: HOSPITAL | Age: 85
Discharge: HOME | End: 2025-07-16
Admitting: NURSE PRACTITIONER
Payer: MEDICARE

## 2025-07-16 DIAGNOSIS — K62.5 BRIGHT RED BLOOD PER RECTUM: ICD-10-CM

## 2025-07-16 DIAGNOSIS — D50.9 IRON DEFICIENCY ANEMIA, UNSPECIFIED IRON DEFICIENCY ANEMIA TYPE: ICD-10-CM

## 2025-07-16 PROCEDURE — 74178 CT ABD&PLV WO CNTR FLWD CNTR: CPT

## 2025-07-16 PROCEDURE — 25510000001 IOPAMIDOL PER 1 ML: Performed by: NURSE PRACTITIONER

## 2025-07-16 RX ORDER — IOPAMIDOL 755 MG/ML
100 INJECTION, SOLUTION INTRAVASCULAR
Status: COMPLETED | OUTPATIENT
Start: 2025-07-16 | End: 2025-07-16

## 2025-07-16 RX ADMIN — IOPAMIDOL 100 ML: 755 INJECTION, SOLUTION INTRAVENOUS at 15:04

## 2025-07-17 ENCOUNTER — RESULTS FOLLOW-UP (OUTPATIENT)
Dept: UROLOGY | Age: 85
End: 2025-07-17
Payer: MEDICARE

## 2025-07-18 NOTE — TELEPHONE ENCOUNTER
"Soledad Iraj called back and I relayed Karla message \"Please let the patient know his CT scan does not reveal any significant abnormalities at this time. He does not have any abnormally enlarged lymph nodes. He has no kidney stones and no backup of fluid on his kidneys.\" She verbalized understanding and had no further questions  "

## 2025-07-25 ENCOUNTER — APPOINTMENT (OUTPATIENT)
Dept: CT IMAGING | Facility: HOSPITAL | Age: 85
DRG: 812 | End: 2025-07-25
Payer: MEDICARE

## 2025-07-25 ENCOUNTER — APPOINTMENT (OUTPATIENT)
Dept: GENERAL RADIOLOGY | Facility: HOSPITAL | Age: 85
DRG: 812 | End: 2025-07-25
Payer: MEDICARE

## 2025-07-25 ENCOUNTER — HOSPITAL ENCOUNTER (EMERGENCY)
Facility: HOSPITAL | Age: 85
Discharge: HOME | DRG: 812 | End: 2025-07-25
Attending: EMERGENCY MEDICINE
Payer: MEDICARE

## 2025-07-25 VITALS
RESPIRATION RATE: 12 BRPM | OXYGEN SATURATION: 98 % | SYSTOLIC BLOOD PRESSURE: 112 MMHG | TEMPERATURE: 97.8 F | BODY MASS INDEX: 25.29 KG/M2 | WEIGHT: 197.09 LBS | HEART RATE: 94 BPM | DIASTOLIC BLOOD PRESSURE: 75 MMHG | HEIGHT: 74 IN

## 2025-07-25 DIAGNOSIS — K80.20 CALCULUS OF GALLBLADDER WITHOUT CHOLECYSTITIS WITHOUT OBSTRUCTION: ICD-10-CM

## 2025-07-25 DIAGNOSIS — Z86.69 HISTORY OF PARKINSON'S DISEASE: ICD-10-CM

## 2025-07-25 DIAGNOSIS — R29.6 FREQUENT FALLS: ICD-10-CM

## 2025-07-25 DIAGNOSIS — S30.0XXA TRAUMATIC HEMATOMA OF LOWER BACK, INITIAL ENCOUNTER: Primary | ICD-10-CM

## 2025-07-25 LAB
ALBUMIN SERPL-MCNC: 4.1 G/DL (ref 3.5–5.2)
ALBUMIN/GLOB SERPL: 1.5 G/DL
ALP SERPL-CCNC: 103 U/L (ref 39–117)
ALT SERPL W P-5'-P-CCNC: <5 U/L (ref 1–41)
ANION GAP SERPL CALCULATED.3IONS-SCNC: 10.7 MMOL/L (ref 5–15)
AST SERPL-CCNC: 17 U/L (ref 1–40)
BACTERIA UR QL AUTO: NORMAL /HPF
BASOPHILS # BLD AUTO: 0.03 10*3/MM3 (ref 0–0.2)
BASOPHILS NFR BLD AUTO: 0.4 % (ref 0–1.5)
BILIRUB SERPL-MCNC: 1 MG/DL (ref 0–1.2)
BILIRUB UR QL STRIP: NEGATIVE
BUN SERPL-MCNC: 23.6 MG/DL (ref 8–23)
BUN/CREAT SERPL: 19 (ref 7–25)
CALCIUM SPEC-SCNC: 9.5 MG/DL (ref 8.6–10.5)
CHLORIDE SERPL-SCNC: 99 MMOL/L (ref 98–107)
CLARITY UR: CLEAR
CO2 SERPL-SCNC: 26.3 MMOL/L (ref 22–29)
COLOR UR: YELLOW
CREAT SERPL-MCNC: 1.24 MG/DL (ref 0.76–1.27)
DEPRECATED RDW RBC AUTO: 52 FL (ref 37–54)
EGFRCR SERPLBLD CKD-EPI 2021: 57.3 ML/MIN/1.73
EOSINOPHIL # BLD AUTO: 0.2 10*3/MM3 (ref 0–0.4)
EOSINOPHIL NFR BLD AUTO: 2.5 % (ref 0.3–6.2)
ERYTHROCYTE [DISTWIDTH] IN BLOOD BY AUTOMATED COUNT: 13.6 % (ref 12.3–15.4)
GEN 5 1HR TROPONIN T REFLEX: 27 NG/L
GLOBULIN UR ELPH-MCNC: 2.8 GM/DL
GLUCOSE SERPL-MCNC: 110 MG/DL (ref 65–99)
GLUCOSE UR STRIP-MCNC: NEGATIVE MG/DL
HCT VFR BLD AUTO: 25 % (ref 37.5–51)
HEMOCCULT STL QL IA: NEGATIVE
HGB BLD-MCNC: 8.2 G/DL (ref 13–17.7)
HGB UR QL STRIP.AUTO: ABNORMAL
HOLD SPECIMEN: NORMAL
HOLD SPECIMEN: NORMAL
HYALINE CASTS UR QL AUTO: NORMAL /LPF
IMM GRANULOCYTES # BLD AUTO: 0.04 10*3/MM3 (ref 0–0.05)
IMM GRANULOCYTES NFR BLD AUTO: 0.5 % (ref 0–0.5)
KETONES UR QL STRIP: ABNORMAL
LEUKOCYTE ESTERASE UR QL STRIP.AUTO: NEGATIVE
LYMPHOCYTES # BLD AUTO: 1.48 10*3/MM3 (ref 0.7–3.1)
LYMPHOCYTES NFR BLD AUTO: 18.1 % (ref 19.6–45.3)
MAGNESIUM SERPL-MCNC: 2.1 MG/DL (ref 1.6–2.4)
MCH RBC QN AUTO: 34.3 PG (ref 26.6–33)
MCHC RBC AUTO-ENTMCNC: 32.8 G/DL (ref 31.5–35.7)
MCV RBC AUTO: 104.6 FL (ref 79–97)
MONOCYTES # BLD AUTO: 0.79 10*3/MM3 (ref 0.1–0.9)
MONOCYTES NFR BLD AUTO: 9.7 % (ref 5–12)
NEUTROPHILS NFR BLD AUTO: 5.62 10*3/MM3 (ref 1.7–7)
NEUTROPHILS NFR BLD AUTO: 68.8 % (ref 42.7–76)
NITRITE UR QL STRIP: NEGATIVE
NRBC BLD AUTO-RTO: 0 /100 WBC (ref 0–0.2)
PH UR STRIP.AUTO: 5.5 [PH] (ref 5–8)
PLATELET # BLD AUTO: 143 10*3/MM3 (ref 140–450)
PMV BLD AUTO: 9.7 FL (ref 6–12)
POTASSIUM SERPL-SCNC: 3.9 MMOL/L (ref 3.5–5.2)
PROT SERPL-MCNC: 6.9 G/DL (ref 6–8.5)
PROT UR QL STRIP: NEGATIVE
QT INTERVAL: 363 MS
QTC INTERVAL: 476 MS
RBC # BLD AUTO: 2.39 10*6/MM3 (ref 4.14–5.8)
RBC # UR STRIP: NORMAL /HPF
REF LAB TEST METHOD: NORMAL
SODIUM SERPL-SCNC: 136 MMOL/L (ref 136–145)
SP GR UR STRIP: 1.02 (ref 1–1.03)
SQUAMOUS #/AREA URNS HPF: NORMAL /HPF
TROPONIN T % DELTA: 0
TROPONIN T NUMERIC DELTA: 0 NG/L
TROPONIN T SERPL HS-MCNC: 27 NG/L
UROBILINOGEN UR QL STRIP: ABNORMAL
WBC # UR STRIP: NORMAL /HPF
WBC NRBC COR # BLD AUTO: 8.16 10*3/MM3 (ref 3.4–10.8)
WHOLE BLOOD HOLD COAG: NORMAL
WHOLE BLOOD HOLD SPECIMEN: NORMAL

## 2025-07-25 PROCEDURE — 71045 X-RAY EXAM CHEST 1 VIEW: CPT

## 2025-07-25 PROCEDURE — 93005 ELECTROCARDIOGRAM TRACING: CPT | Performed by: EMERGENCY MEDICINE

## 2025-07-25 PROCEDURE — 84484 ASSAY OF TROPONIN QUANT: CPT

## 2025-07-25 PROCEDURE — 74177 CT ABD & PELVIS W/CONTRAST: CPT

## 2025-07-25 PROCEDURE — 83735 ASSAY OF MAGNESIUM: CPT

## 2025-07-25 PROCEDURE — 82274 ASSAY TEST FOR BLOOD FECAL: CPT

## 2025-07-25 PROCEDURE — 25810000003 SODIUM CHLORIDE 0.9 % SOLUTION

## 2025-07-25 PROCEDURE — 80053 COMPREHEN METABOLIC PANEL: CPT

## 2025-07-25 PROCEDURE — 70450 CT HEAD/BRAIN W/O DYE: CPT

## 2025-07-25 PROCEDURE — 99285 EMERGENCY DEPT VISIT HI MDM: CPT

## 2025-07-25 PROCEDURE — 81001 URINALYSIS AUTO W/SCOPE: CPT | Performed by: EMERGENCY MEDICINE

## 2025-07-25 PROCEDURE — 25510000001 IOPAMIDOL PER 1 ML: Performed by: EMERGENCY MEDICINE

## 2025-07-25 PROCEDURE — 73502 X-RAY EXAM HIP UNI 2-3 VIEWS: CPT

## 2025-07-25 PROCEDURE — 36415 COLL VENOUS BLD VENIPUNCTURE: CPT

## 2025-07-25 PROCEDURE — 93005 ELECTROCARDIOGRAM TRACING: CPT

## 2025-07-25 PROCEDURE — 84484 ASSAY OF TROPONIN QUANT: CPT | Performed by: EMERGENCY MEDICINE

## 2025-07-25 PROCEDURE — 85025 COMPLETE CBC W/AUTO DIFF WBC: CPT

## 2025-07-25 PROCEDURE — 73552 X-RAY EXAM OF FEMUR 2/>: CPT

## 2025-07-25 RX ORDER — LANOLIN ALCOHOL/MO/W.PET/CERES
1000 CREAM (GRAM) TOPICAL DAILY
COMMUNITY

## 2025-07-25 RX ORDER — ACETAMINOPHEN 500 MG
1000 TABLET ORAL ONCE
Status: COMPLETED | OUTPATIENT
Start: 2025-07-25 | End: 2025-07-25

## 2025-07-25 RX ORDER — SODIUM CHLORIDE 0.9 % (FLUSH) 0.9 %
10 SYRINGE (ML) INJECTION AS NEEDED
Status: DISCONTINUED | OUTPATIENT
Start: 2025-07-25 | End: 2025-07-25 | Stop reason: HOSPADM

## 2025-07-25 RX ORDER — IOPAMIDOL 755 MG/ML
100 INJECTION, SOLUTION INTRAVASCULAR
Status: COMPLETED | OUTPATIENT
Start: 2025-07-25 | End: 2025-07-25

## 2025-07-25 RX ADMIN — ACETAMINOPHEN 1000 MG: 500 TABLET ORAL at 16:02

## 2025-07-25 RX ADMIN — SODIUM CHLORIDE 1000 ML: 9 INJECTION, SOLUTION INTRAVENOUS at 15:58

## 2025-07-25 RX ADMIN — IOPAMIDOL 90 ML: 755 INJECTION, SOLUTION INTRAVENOUS at 15:38

## 2025-07-25 NOTE — ED PROVIDER NOTES
"Time: 3:29 PM EDT  Date of encounter:  7/25/2025  Independent Historian/Clinical History and Information was obtained by:   Patient and Family    History is limited by: N/A    Chief Complaint   Patient presents with    Fall         History of Present Illness:  Patient is a 85 y.o. year old male who presents to the emergency department for evaluation of falls.  Patient has a history of Parkinson's and has been having more frequent falls.  He had a fall Tuesday, Wednesday and then again last night.  Patient is supposed to ambulate with a walker and does not use it at all times.  Family member at bedside states that last night they heard a thud and went to check on him from the bathroom and he was on his back on the ground and the walker was behind him.  Patient states he did hit the back of his head and believes he did go unconscious for couple minutes.  Patient is able to ambulate.  Big hematoma noted to the lower back area.  Denies headaches, nausea or vomiting.  Patient is on blood thinners due to A-fib.    Patient Care Team  Primary Care Provider: Martin Monahan MD    Past Medical History:     Allergies   Allergen Reactions    Loratadine Other (See Comments) and Unknown - Low Severity     PATIENT & SPOUSE SAID IF HE TAKES IT TO LONG (OR TO MANY DAYS IN A ROW) IT DRIES HIM OUT & MAKES HIS NOSE BLEED.     Past Medical History:   Diagnosis Date    Atrial fibrillation, persistent  10/14/2021    BPH (benign prostatic hyperplasia)     Disease of thyroid gland     Essential hypertension 10/14/2021    Falls frequently     SPOUSE REPORTS MULTIPLE FALLS \"EVERY WEEK. I'VE LOST COUNT.\"    Generalized weakness     History of CVA (cerebrovascular accident) 2012    RESIDUAL OF LEFT SIDE WEAKNESS    Parkinson disease 11/2023    Urgency of urination     WEARS BRIEFS AT HOME     Past Surgical History:   Procedure Laterality Date    DENTAL PROCEDURE      LEG SURGERY Right      History reviewed. No pertinent family " history.    Home Medications:  Prior to Admission medications    Medication Sig Start Date End Date Taking? Authorizing Provider   acetaminophen (TYLENOL) 500 MG tablet Take 1 tablet by mouth Every 6 (Six) Hours As Needed for Mild Pain.    Maribel Royal MD   albuterol sulfate HFA (Ventolin HFA) 108 (90 Base) MCG/ACT inhaler Inhale 2 puffs Every 4 (Four) Hours As Needed for Wheezing or Shortness of Air. 6/24/25   Hansa Kenny MD   carbidopa-levodopa (SINEMET)  MG per tablet 2 tablets 4 times a day at 10 am, 1 pm 4 pm, 7 pm. 2/25/25   OropillToro villatoro MD   carvedilol (COREG) 6.25 MG tablet Take 1 tablet by mouth 2 (Two) Times a Day. 3/7/25   Peggy Ordonez APRN   Cholecalciferol 125 MCG (5000 UT) tablet dispersible Place 125 mcg on the tongue Daily. 8/1/24   Maribel Royal MD   dilTIAZem (TIAZAC) 120 MG 24 hr capsule Take 1 capsule by mouth Daily. 7/24/24   Maribel Royal MD   escitalopram (LEXAPRO) 20 MG tablet Take 1 tablet by mouth Daily. 7/20/21   Emergency, Nurse Tania RN   ezetimibe (ZETIA) 10 MG tablet Take 1 tablet by mouth Daily. 7/20/21   Emergency, Nurse Tania RN   finasteride (PROSCAR) 5 MG tablet TAKE 1 TABLET BY MOUTH DAILY 6/4/25   Ilana Sanchez APRN   furosemide (LASIX) 40 MG tablet TAKE 1 TABLET BY MOUTH DAILY 6/30/25   Peggy Ordonez APRN   levothyroxine (SYNTHROID, LEVOTHROID) 50 MCG tablet Take 1 tablet by mouth Daily. 7/24/21   Emergency, Nurse Tania RN   melatonin 5 MG tablet tablet Take 1 tablet by mouth Every Night. 7/24/24   Maribel Royal MD   multivitamin with minerals tablet tablet Take 1 tablet by mouth Daily.    Emergency, Nurse Epic, RN   mupirocin (BACTROBAN) 2 % ointment 1 Application Every 12 (Twelve) Hours. 6/30/25   Maribel Royal MD   potassium chloride 10 MEQ CR tablet TAKE 1 TABLET BY MOUTH DAILY 8/5/24   Peggy Ordonez APRN   tamsulosin (FLOMAX) 0.4 MG capsule 24 hr capsule TAKE 1 CAPSULE BY MOUTH  "DAILY  Patient taking differently: Take 2 capsules by mouth Daily. 25   Ilana Sanchez APRN   vitamin B-12 (CYANOCOBALAMIN) 1000 MCG tablet Take 1 tablet by mouth Daily.    Provider, MD Maribel   Xarelto 20 MG tablet TAKE 1 TABLET BY MOUTH DAILY WITH DINNER 10/11/24   Peggy Ordonez APRN        Social History:   Social History     Tobacco Use    Smoking status: Former     Current packs/day: 0.00     Average packs/day: 1 pack/day for 8.8 years (8.8 ttl pk-yrs)     Types: Cigarettes     Start date:      Quit date: 10/14/1961     Years since quittin.8     Passive exposure: Past    Smokeless tobacco: Never   Vaping Use    Vaping status: Never Used   Substance Use Topics    Alcohol use: Never    Drug use: Never         Review of Systems:  Review of Systems   Gastrointestinal:  Negative for nausea and vomiting.   Musculoskeletal:  Positive for arthralgias and back pain.   Skin:  Positive for color change. Negative for wound.   Neurological:  Positive for syncope. Negative for headaches.        Physical Exam:  /78   Pulse 107   Temp 97.9 °F (36.6 °C)   Resp 13   Ht 188 cm (74\")   Wt 89.4 kg (197 lb 1.5 oz)   SpO2 97%   BMI 25.31 kg/m²         Physical Exam  Vitals and nursing note reviewed.   Constitutional:       Appearance: Normal appearance.   HENT:      Head: Normocephalic and atraumatic. No raccoon eyes, Puri's sign, abrasion, contusion, masses or laceration. Hair is normal.      Nose: Nose normal.      Mouth/Throat:      Mouth: Mucous membranes are moist.   Eyes:      General:         Right eye: No discharge.         Left eye: No discharge.      Extraocular Movements: Extraocular movements intact.      Conjunctiva/sclera: Conjunctivae normal.      Pupils: Pupils are equal, round, and reactive to light.   Cardiovascular:      Rate and Rhythm: Normal rate and regular rhythm.      Heart sounds: Normal heart sounds.   Pulmonary:      Effort: Pulmonary effort is normal.      " Breath sounds: Normal breath sounds.   Chest:      Chest wall: No tenderness.   Abdominal:      General: Abdomen is flat.      Palpations: Abdomen is soft.      Tenderness: There is no abdominal tenderness. There is no right CVA tenderness, left CVA tenderness, guarding or rebound.   Musculoskeletal:      Cervical back: Neck supple. No tenderness or bony tenderness. No pain with movement. Normal range of motion.      Thoracic back: Normal. No tenderness.      Lumbar back: Tenderness present. No swelling.   Skin:     General: Skin is warm and dry.      Findings: Bruising and ecchymosis present.          Neurological:      General: No focal deficit present.      Mental Status: He is alert and oriented to person, place, and time.   Psychiatric:         Mood and Affect: Mood normal.         Behavior: Behavior normal.                  Medical Decision Making:      Comorbidities that affect care:    Parkinson, Atrial Fibrillation, Thyroid Disease    External Notes reviewed:    Previous Clinic Note: Office visit with urology 7/7/2025 for urinary retention, BPH, bright red blood per rectum, iron deficiency anemia.  Office visit with palliative care 7/25/2025 for Parkinson's disease, Previous Radiological Studies: CT abdomen pelvis from 7/16/2025 no acute findings in the abdomen and pelvis, cholelithiasis, small hiatal hernia., and Previous Labs: CBC from 3 months ago showing hemoglobin of 12.1 with hematocrit of 37.2      The following orders were placed and all results were independently analyzed by me:  Orders Placed This Encounter   Procedures    XR Hip With or Without Pelvis 2 - 3 View Right    XR Chest 1 View    CT Abdomen Pelvis With Contrast    XR Femur 2 View Right    CT Head Without Contrast    [place] Draw    Comprehensive Metabolic Panel    High Sensitivity Troponin T    Magnesium    Urinalysis With Microscopic If Indicated (No Culture) - Urine, Clean Catch    CBC Auto Differential    High Sensitivity Troponin T  1Hr    Urinalysis, Microscopic Only - Urine, Clean Catch    Occult Blood, Fecal By Immunoassay - Stool, Per Rectum    NPO Diet NPO Type: Strict NPO    Undress & Gown    Continuous Pulse Oximetry    Vital Signs    Orthostatic Blood Pressure    Oxygen Therapy- Nasal Cannula; Titrate 1-6 LPM Per SpO2; 90 - 95%    POC Glucose Once    ECG 12 Lead Other; frequent falls    Insert Peripheral IV    Fall Precautions    CBC & Differential    Green Top (Gel)    Lavender Top    Gold Top - SST    Light Blue Top       Medications Given in the Emergency Department:  Medications   sodium chloride 0.9 % flush 10 mL (has no administration in time range)   sodium chloride 0.9 % bolus 1,000 mL (1,000 mL Intravenous New Bag 7/25/25 1558)   acetaminophen (TYLENOL) tablet 1,000 mg (1,000 mg Oral Given 7/25/25 1602)   iopamidol (ISOVUE-370) 76 % injection 100 mL (90 mL Intravenous Given 7/25/25 1538)        ED Course:    The patient was initially evaluated in the triage area where orders were placed. The patient was later dispositioned by Tarik Morelos PA-C.      The patient was advised to stay for completion of workup which includes but is not limited to communication of labs and radiological results, reassessment and plan. The patient was advised that leaving prior to disposition by a provider could result in critical findings that are not communicated to the patient.     ED Course as of 07/25/25 1812 Fri Jul 25, 2025   1806 Tech was able to ambulate the patient. [AJ]      ED Course User Index  [AJ] Tarik Morelos PA-C       Labs:    Lab Results (last 24 hours)       Procedure Component Value Units Date/Time    CBC & Differential [[id number]]  (Abnormal) Collected: 07/25/25 1252    Specimen: Blood Updated: 07/25/25 1259    Narrative:      The following orders were created for panel order CBC & Differential.  Procedure                               Abnormality         Status                     ---------                                -----------         ------                     CBC Auto Differential[[id number]]        Abnormal            Final result                 Please view results for these tests on the individual orders.    Comprehensive Metabolic Panel [[id number]]  (Abnormal) Collected: 07/25/25 1252    Specimen: Blood Updated: 07/25/25 1350     Glucose 110 mg/dL      BUN 23.6 mg/dL      Creatinine 1.24 mg/dL      Sodium 136 mmol/L      Potassium 3.9 mmol/L      Chloride 99 mmol/L      CO2 26.3 mmol/L      Calcium 9.5 mg/dL      Total Protein 6.9 g/dL      Albumin 4.1 g/dL      ALT (SGPT) <5 U/L      AST (SGOT) 17 U/L      Alkaline Phosphatase 103 U/L      Total Bilirubin 1.0 mg/dL      Globulin 2.8 gm/dL      A/G Ratio 1.5 g/dL      BUN/Creatinine Ratio 19.0     Anion Gap 10.7 mmol/L      eGFR 57.3 mL/min/1.73     Narrative:      GFR Categories in Chronic Kidney Disease (CKD)              GFR Category          GFR (mL/min/1.73)    Interpretation  G1                    90 or greater        Normal or high (1)  G2                    60-89                Mild decrease (1)  G3a                   45-59                Mild to moderate decrease  G3b                   30-44                Moderate to severe decrease  G4                    15-29                Severe decrease  G5                    14 or less           Kidney failure    (1)In the absence of evidence of kidney disease, neither GFR category G1 or G2 fulfill the criteria for CKD.    eGFR calculation 2021 CKD-EPI creatinine equation, which does not include race as a factor    High Sensitivity Troponin T [[id number]]  (Abnormal) Collected: 07/25/25 1252    Specimen: Blood Updated: 07/25/25 1330     HS Troponin T 27 ng/L     Narrative:      High Sensitive Troponin T Reference Range:  <14.0 ng/L- Negative Female for AMI  <22.0 ng/L- Negative Male for AMI  >=14 - Abnormal Female indicating possible myocardial injury.  >=22 - Abnormal Male indicating possible myocardial injury.    Clinicians would have to utilize clinical acumen, EKG, Troponin, and serial changes to determine if it is an Acute Myocardial Infarction or myocardial injury due to an underlying chronic condition.         Magnesium [[id number]]  (Normal) Collected: 07/25/25 1252    Specimen: Blood Updated: 07/25/25 1331     Magnesium 2.1 mg/dL     CBC Auto Differential [[id number]]  (Abnormal) Collected: 07/25/25 1252    Specimen: Blood Updated: 07/25/25 1259     WBC 8.16 10*3/mm3      RBC 2.39 10*6/mm3      Hemoglobin 8.2 g/dL      Hematocrit 25.0 %      .6 fL      MCH 34.3 pg      MCHC 32.8 g/dL      RDW 13.6 %      RDW-SD 52.0 fl      MPV 9.7 fL      Platelets 143 10*3/mm3      Neutrophil % 68.8 %      Lymphocyte % 18.1 %      Monocyte % 9.7 %      Eosinophil % 2.5 %      Basophil % 0.4 %      Immature Grans % 0.5 %      Neutrophils, Absolute 5.62 10*3/mm3      Lymphocytes, Absolute 1.48 10*3/mm3      Monocytes, Absolute 0.79 10*3/mm3      Eosinophils, Absolute 0.20 10*3/mm3      Basophils, Absolute 0.03 10*3/mm3      Immature Grans, Absolute 0.04 10*3/mm3      nRBC 0.0 /100 WBC     Urinalysis With Microscopic If Indicated (No Culture) - Urine, Clean Catch [[id number]]  (Abnormal) Collected: 07/25/25 1426    Specimen: Urine, Clean Catch Updated: 07/25/25 1443     Color, UA Yellow     Appearance, UA Clear     pH, UA 5.5     Specific Gravity, UA 1.017     Glucose, UA Negative     Ketones, UA Trace     Bilirubin, UA Negative     Blood, UA Moderate (2+)     Protein, UA Negative     Leuk Esterase, UA Negative     Nitrite, UA Negative     Urobilinogen, UA 0.2 E.U./dL    Urinalysis, Microscopic Only - Urine, Clean Catch [[id number]] Collected: 07/25/25 1426    Specimen: Urine, Clean Catch Updated: 07/25/25 1443     RBC, UA 0-2 /HPF      WBC, UA 0-2 /HPF      Bacteria, UA None Seen /HPF      Squamous Epithelial Cells, UA 0-2 /HPF      Hyaline Casts, UA 0-2 /LPF      Methodology Automated Microscopy    High Sensitivity Troponin T  1Hr [[id number]]  (Abnormal) Collected: 07/25/25 1613    Specimen: Blood from Arm, Left Updated: 07/25/25 1643     HS Troponin T 27 ng/L      Troponin T Numeric Delta 0 ng/L      Troponin T % Delta 0    Narrative:      High Sensitive Troponin T Reference Range:  <14.0 ng/L- Negative Female for AMI  <22.0 ng/L- Negative Male for AMI  >=14 - Abnormal Female indicating possible myocardial injury.  >=22 - Abnormal Male indicating possible myocardial injury.   Clinicians would have to utilize clinical acumen, EKG, Troponin, and serial changes to determine if it is an Acute Myocardial Infarction or myocardial injury due to an underlying chronic condition.         Occult Blood, Fecal By Immunoassay - Stool, Per Rectum [[id number]]  (Normal) Collected: 07/25/25 1616    Specimen: Stool from Per Rectum Updated: 07/25/25 1633     Occult Blood, Fecal by Immunoassay Negative             Imaging:    CT Head Without Contrast  Result Date: 7/25/2025  CT HEAD WO CONTRAST Date of Exam: 7/25/2025 3:34 PM EDT Indication: fall/on BT;s. Comparison: Head CT 8/12/2023. Technique: Axial CT images were obtained of the head without contrast administration.  Reconstructed coronal and sagittal images were also obtained. Automated exposure control and iterative construction methods were used. Findings: Opacified blood pool from recent intravenous contrast administration. No acute intracranial hemorrhage.Intact appearing gray-white differentiation.No extra-axial fluid collection.No significant mass effect. 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 Chronic appearing lacunar infarcts in the bilateral basal ganglia. No hydrocephalus. Mild generalized parenchymal volume loss. Scattered areas of periventricular and subcortical white matter hypoattenuation, nonspecific, perhaps from small vessel ischemic/hypertensive changes in a patient of this age.There are intracranial atherosclerotic calcifications. Mild scattered  mucosal thickening in the paranasal sinuses.Mastoid air cells are essentially clear.. Bilateral lens replacements. No acute or aggressive appearing osseous or extracranial soft tissue process.     Impression: No acute intracranial findings. Electronically Signed: Roberto Phan MD  7/25/2025 4:01 PM EDT  Workstation ID: [id number]    XR Femur 2 View Right  Result Date: 7/25/2025  XR FEMUR 2 VW RIGHT Date of Exam: 7/25/2025 3:39 PM EDT Indication: pain/swelling lateral side pain Comparison: Pelvis/right hip 7/25/2025 Findings: Evaluation of the right femur demonstrates previous surgical fixation of a femoral shaft proximal fracture with lateral surgical plate and screws. Fracture through the surgical plate is noted. It is unclear whether this is new or chronic. The femoral head is normally seated within its respective acetabulum with degenerative arthrosis and joint space narrowing. There are degenerative changes at the level the knee with advanced medial and lateral compartment joint space narrowing. Mild soft tissue prominence along the right lateral flank and buttock region is noted.     Impression: 1. No acute osseous injury to the right femur. 2. Previous surgical fixation of a proximal femoral shaft fracture with lateral surgical plate and screws. There is a transverse fracture through the surgical plate. No previous imaging available so it is unclear whether this represents acute or chronic finding. Electronically Signed: Ann Dooley MD  7/25/2025 3:50 PM EDT  Workstation ID: [id number]    CT Abdomen Pelvis With Contrast  Result Date: 7/25/2025  CT ABDOMEN PELVIS W CONTRAST Date of Exam: 7/25/2025 3:36 PM EDT Indication: fall/bruising to low back. Comparison: CT abdomen and pelvis 7/16/2025 Technique: Axial CT images were obtained of the abdomen and pelvis after the uneventful intravenous administration of iodinated contrast. Reconstructed coronal and sagittal images were also obtained. Automated exposure  control and iterative construction methods were used. Findings: LUNG BASES: The heart is prominent in size. Coronary artery calcifications are present. LIVER:  Unremarkable parenchyma without focal lesion. BILIARY/GALLBLADDER: There are layering calcified gallstones. SPLEEN:  Unremarkable PANCREAS:  Unremarkable ADRENAL:  Unremarkable KIDNEYS:  Unremarkable parenchyma with no solid mass identified. No obstruction.  No calculus identified. There is a cyst along the lower pole of the right kidney measuring 2.7 cm. GASTROINTESTINAL/MESENTERY: Evaluation of the gastrointestinal tract demonstrates diverticulosis without CT evidence for acute diverticulitis. No obstructive changes noted. The appendix is normal in caliber. MESENTERIC VESSELS:  Patent. AORTA/IVC: There is calcified atherosclerotic disease of the abdominal aorta. There is greater than 50% stenosis involving the proximal left internal iliac artery without evidence for complete occlusion. RETROPERITONEUM/LYMPH NODES: Shotty retroperitoneal and mesenteric lymph nodes without pathologic lymphadenopathy noted. REPRODUCTIVE:  Unremarkable BLADDER:  Unremarkable OSSEUS STRUCTURES: Chronic appearing compression injury is noted along the superior endplate of L3 with intervertebral disc base narrowing. The femoral heads are symmetric in appearance well seated within the respective acetabulum. No evidence for sacral  insufficiency fracture. The coccyx appears intact. Superior and inferior pubic rami are intact. There is subcutaneous edema and stranding in the buttock region bilaterally. Along the posterior margin of the sacrum right of midline there is a soft tissue  hematoma measuring 2.9 x 4.8 cm. Developed a developing hematoma left of midline is also suspected measuring 1.7 x 2.3 cm. Stranding and edema extends along the upper thighs bilaterally.     Impression: 1. Soft tissue hematomas posterior to the body of the sacrum right of midline measuring up to 2.9 x 4.8  cm with soft tissue edema and stranding in the buttock region and upper thighs bilaterally; however no evidence for acute osseous injury. 2. No acute visceral organ injury in the abdomen or pelvis. 3. Cholelithiasis. Electronically Signed: Ann oDoley MD  7/25/2025 3:48 PM EDT  Workstation ID: [id number]    XR Chest 1 View  Result Date: 7/25/2025  XR CHEST 1 VW Date of Exam: 7/25/2025 2:49 PM EDT Indication: Weak/Dizzy/AMS triage protocol Comparison: June 2025 Findings: Lungs are clear. Heart size is stable mildly prominent. The aorta is mildly elongated and calcified.     Impression: No acute cardiopulmonary disease Electronically Signed: Bienvenido Mullen MD  7/25/2025 3:13 PM EDT  Workstation ID: [id number]    XR Hip With or Without Pelvis 2 - 3 View Right  Result Date: 7/25/2025  XR HIP W OR WO PELVIS 2-3 VIEW RIGHT Date of Exam: 7/25/2025 2:49 PM EDT Indication: fall Comparison: CT abdomen pelvis 7/16/2025 Findings: No acute fracture or dislocation. Moderate bilateral hip and sacroiliac joint osteoarthrosis. Right femoral fixation hardware. Lower lumbar degenerative changes, incompletely imaged and evaluated. No focal soft tissue abnormality appreciated.     Impression: No radiographic evidence of an acute fracture or dislocation. Electronically Signed: Roberto Phan MD  7/25/2025 3:12 PM EDT  Workstation ID: [id number]        Differential Diagnosis and Discussion:      Back Pain: The patient presents with back pain. My differential diagnosis includes but is not limited to acute spinal epidural abscess, acute spinal epidural bleed, cauda equina syndrome, abdominal aortic aneurysm, aortic dissection, kidney stone, pyelonephritis, musculoskeletal back pain, spinal fracture, and osteoarthritis.   Wound Evaluation: Differential diagnosis includes but is not limited to laceration, abrasion, puncture, burn, ulcer, cellulitis, abscess, vasculitis, malignancy, and rash.    PROCEDURES:    Labs were collected in the  emergency department and all labs were reviewed and interpreted by me.  X-ray were performed in the emergency department and all X-ray impressions were independently interpreted by me.  An EKG was performed and the EKG was interpreted by me.  An EKG was performed and the EKG was interpreted by supervising attending.  CT scan was performed in the emergency department and the CT scan radiology impression was interpreted by me.    ECG 12 Lead Other; frequent falls   Preliminary Result   HEART PLDN=809  bpm   RR Tcseyunw=414  ms   MD Interval=  ms   P Horizontal Axis=  deg   P Front Axis=  deg   QRSD Interval=94  ms   QT Xtlsskhq=673  ms   KZgG=285  ms   QRS Axis=31  deg   T Wave Axis=  deg   - ABNORMAL ECG -   Atrial fibrillation   Borderline T abnormalities, inferior leads   Borderline prolonged QT interval   Date and Time of Study:2025-07-25 12:46:39           Procedures    MDM     Amount and/or Complexity of Data Reviewed  Clinical lab tests: reviewed  Tests in the radiology section of CPT®: reviewed  Tests in the medicine section of CPT®: reviewed  Decide to obtain previous medical records or to obtain history from someone other than the patient: yes                     Patient Care Considerations:    NARCOTICS: I considered prescribing opiate pain medication as an outpatient, however all imaging negative for fractures or dislocations      Consultants/Shared Management Plan:    SHARED VISIT: I have discussed the case with my supervising physician, Dr. De Dios who states reviewed labs and imaging. The substantive portion of the medical decision was made by the attesting physician who made or approve the management plan and will take responsibility for the patient.  Clinical findings were discussed and ultimate disposition was made in consult with supervising physician.    Social Determinants of Health:    Patient has presented with family members who are responsible, reliable and will ensure follow up  care.      Disposition and Care Coordination:    Discharged: I considered escalation of care by admitting this patient to the hospital, however all imaging negative.  Has a history of anemia with a hemoglobin today of 8.2 and fecal occult negative.  Is hemodynamically stable.  Can follow-up with PCP in office    I have explained the patient´s condition, diagnoses and treatment plan based on the information available to me at this time. I have answered questions and addressed any concerns. The patient has a good  understanding of the patient´s diagnosis, condition, and treatment plan as can be expected at this point. The vital signs have been stable. The patient´s condition is stable and appropriate for discharge from the emergency department.      The patient will pursue further outpatient evaluation with the primary care physician or other designated or consulting physician as outlined in the discharge instructions. They are agreeable to this plan of care and follow-up instructions have been explained in detail. The patient has received these instructions in written format and has expressed an understanding of the discharge instructions. The patient is aware that any significant change in condition or worsening of symptoms should prompt an immediate return to this or the closest emergency department or call to 911.    Final diagnoses:   History of Parkinson's disease   Traumatic hematoma of lower back, initial encounter   Frequent falls   Calculus of gallbladder without cholecystitis without obstruction        ED Disposition       ED Disposition   Discharge    Condition   Stable    Comment   --               This medical record created using voice recognition software.             Tarik Morelos PA-C  07/25/25 [id number]

## 2025-07-25 NOTE — ED PROVIDER NOTES
"SHARED VISIT ATTESTATION:    This visit was performed by myself and an APC.  I personally approved the management plan/medical decision making and take responsibility for the patient management.      SHARED VISIT NOTE:    Patient is 85 y.o. year old male that presents to the ED for evaluation of falls.  The patient fell and injured his head..  The patient is a history of Parkinson's disease.        ED Course:    /75   Pulse 94   Temp 97.8 °F (36.6 °C)   Resp 12   Ht 188 cm (74\")   Wt 89.4 kg (197 lb 1.5 oz)   SpO2 98%   BMI 25.31 kg/m²       The following orders were placed and all results were independently analyzed by me:  Orders Placed This Encounter   Procedures    XR Hip With or Without Pelvis 2 - 3 View Right    XR Chest 1 View    CT Abdomen Pelvis With Contrast    XR Femur 2 View Right    CT Head Without Contrast    [place] Draw    Comprehensive Metabolic Panel    High Sensitivity Troponin T    Magnesium    Urinalysis With Microscopic If Indicated (No Culture) - Urine, Clean Catch    CBC Auto Differential    High Sensitivity Troponin T 1Hr    Urinalysis, Microscopic Only - Urine, Clean Catch    Occult Blood, Fecal By Immunoassay - Stool, Per Rectum    Undress & Gown    Continuous Pulse Oximetry    Vital Signs    ECG 12 Lead Other; frequent falls    CBC & Differential    Green Top (Gel)    Lavender Top    Gold Top - SST    Light Blue Top       Medications Given in the Emergency Department:  Medications   sodium chloride 0.9 % bolus 1,000 mL (0 mL Intravenous Stopped 7/25/25 1730)   acetaminophen (TYLENOL) tablet 1,000 mg (1,000 mg Oral Given 7/25/25 1602)   iopamidol (ISOVUE-370) 76 % injection 100 mL (90 mL Intravenous Given 7/25/25 1538)        ED Course:    ED Course as of 07/25/25 2336   Fri Jul 25, 2025   1806 Tech was able to ambulate the patient. [AJ]      ED Course User Index  [AJ] Tarik Morelos, ALYSSA       Labs:    Lab Results (last 24 hours)       Procedure Component Value Units " Date/Time    CBC & Differential [[id number]]  (Abnormal) Collected: 07/25/25 1252    Specimen: Blood Updated: 07/25/25 1259    Narrative:      The following orders were created for panel order CBC & Differential.  Procedure                               Abnormality         Status                     ---------                               -----------         ------                     CBC Auto Differential[[id number]]        Abnormal            Final result                 Please view results for these tests on the individual orders.    Comprehensive Metabolic Panel [[id number]]  (Abnormal) Collected: 07/25/25 1252    Specimen: Blood Updated: 07/25/25 1350     Glucose 110 mg/dL      BUN 23.6 mg/dL      Creatinine 1.24 mg/dL      Sodium 136 mmol/L      Potassium 3.9 mmol/L      Chloride 99 mmol/L      CO2 26.3 mmol/L      Calcium 9.5 mg/dL      Total Protein 6.9 g/dL      Albumin 4.1 g/dL      ALT (SGPT) <5 U/L      AST (SGOT) 17 U/L      Alkaline Phosphatase 103 U/L      Total Bilirubin 1.0 mg/dL      Globulin 2.8 gm/dL      A/G Ratio 1.5 g/dL      BUN/Creatinine Ratio 19.0     Anion Gap 10.7 mmol/L      eGFR 57.3 mL/min/1.73     Narrative:      GFR Categories in Chronic Kidney Disease (CKD)              GFR Category          GFR (mL/min/1.73)    Interpretation  G1                    90 or greater        Normal or high (1)  G2                    60-89                Mild decrease (1)  G3a                   45-59                Mild to moderate decrease  G3b                   30-44                Moderate to severe decrease  G4                    15-29                Severe decrease  G5                    14 or less           Kidney failure    (1)In the absence of evidence of kidney disease, neither GFR category G1 or G2 fulfill the criteria for CKD.    eGFR calculation 2021 CKD-EPI creatinine equation, which does not include race as a factor    High Sensitivity Troponin T [[id number]]  (Abnormal) Collected: 07/25/25  1252    Specimen: Blood Updated: 07/25/25 1330     HS Troponin T 27 ng/L     Narrative:      High Sensitive Troponin T Reference Range:  <14.0 ng/L- Negative Female for AMI  <22.0 ng/L- Negative Male for AMI  >=14 - Abnormal Female indicating possible myocardial injury.  >=22 - Abnormal Male indicating possible myocardial injury.   Clinicians would have to utilize clinical acumen, EKG, Troponin, and serial changes to determine if it is an Acute Myocardial Infarction or myocardial injury due to an underlying chronic condition.         Magnesium [[id number]]  (Normal) Collected: 07/25/25 1252    Specimen: Blood Updated: 07/25/25 1331     Magnesium 2.1 mg/dL     CBC Auto Differential [[id number]]  (Abnormal) Collected: 07/25/25 1252    Specimen: Blood Updated: 07/25/25 1259     WBC 8.16 10*3/mm3      RBC 2.39 10*6/mm3      Hemoglobin 8.2 g/dL      Hematocrit 25.0 %      .6 fL      MCH 34.3 pg      MCHC 32.8 g/dL      RDW 13.6 %      RDW-SD 52.0 fl      MPV 9.7 fL      Platelets 143 10*3/mm3      Neutrophil % 68.8 %      Lymphocyte % 18.1 %      Monocyte % 9.7 %      Eosinophil % 2.5 %      Basophil % 0.4 %      Immature Grans % 0.5 %      Neutrophils, Absolute 5.62 10*3/mm3      Lymphocytes, Absolute 1.48 10*3/mm3      Monocytes, Absolute 0.79 10*3/mm3      Eosinophils, Absolute 0.20 10*3/mm3      Basophils, Absolute 0.03 10*3/mm3      Immature Grans, Absolute 0.04 10*3/mm3      nRBC 0.0 /100 WBC     Urinalysis With Microscopic If Indicated (No Culture) - Urine, Clean Catch [[id number]]  (Abnormal) Collected: 07/25/25 1426    Specimen: Urine, Clean Catch Updated: 07/25/25 1443     Color, UA Yellow     Appearance, UA Clear     pH, UA 5.5     Specific Gravity, UA 1.017     Glucose, UA Negative     Ketones, UA Trace     Bilirubin, UA Negative     Blood, UA Moderate (2+)     Protein, UA Negative     Leuk Esterase, UA Negative     Nitrite, UA Negative     Urobilinogen, UA 0.2 E.U./dL    Urinalysis, Microscopic Only -  Urine, Clean Catch [[id number]] Collected: 07/25/25 1426    Specimen: Urine, Clean Catch Updated: 07/25/25 1443     RBC, UA 0-2 /HPF      WBC, UA 0-2 /HPF      Bacteria, UA None Seen /HPF      Squamous Epithelial Cells, UA 0-2 /HPF      Hyaline Casts, UA 0-2 /LPF      Methodology Automated Microscopy    High Sensitivity Troponin T 1Hr [[id number]]  (Abnormal) Collected: 07/25/25 1613    Specimen: Blood from Arm, Left Updated: 07/25/25 1643     HS Troponin T 27 ng/L      Troponin T Numeric Delta 0 ng/L      Troponin T % Delta 0    Narrative:      High Sensitive Troponin T Reference Range:  <14.0 ng/L- Negative Female for AMI  <22.0 ng/L- Negative Male for AMI  >=14 - Abnormal Female indicating possible myocardial injury.  >=22 - Abnormal Male indicating possible myocardial injury.   Clinicians would have to utilize clinical acumen, EKG, Troponin, and serial changes to determine if it is an Acute Myocardial Infarction or myocardial injury due to an underlying chronic condition.         Occult Blood, Fecal By Immunoassay - Stool, Per Rectum [[id number]]  (Normal) Collected: 07/25/25 1616    Specimen: Stool from Per Rectum Updated: 07/25/25 1633     Occult Blood, Fecal by Immunoassay Negative             Imaging:    CT Head Without Contrast  Result Date: 7/25/2025  CT HEAD WO CONTRAST Date of Exam: 7/25/2025 3:34 PM EDT Indication: fall/on BT;s. Comparison: Head CT 8/12/2023. Technique: Axial CT images were obtained of the head without contrast administration.  Reconstructed coronal and sagittal images were also obtained. Automated exposure control and iterative construction methods were used. Findings: Opacified blood pool from recent intravenous contrast administration. No acute intracranial hemorrhage.Intact appearing gray-white differentiation.No extra-axial fluid collection.No significant mass effect. 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 Chronic appearing lacunar infarcts in the  bilateral basal ganglia. No hydrocephalus. Mild generalized parenchymal volume loss. Scattered areas of periventricular and subcortical white matter hypoattenuation, nonspecific, perhaps from small vessel ischemic/hypertensive changes in a patient of this age.There are intracranial atherosclerotic calcifications. Mild scattered mucosal thickening in the paranasal sinuses.Mastoid air cells are essentially clear.. Bilateral lens replacements. No acute or aggressive appearing osseous or extracranial soft tissue process.     Impression: No acute intracranial findings. Electronically Signed: Roberto Phan MD  7/25/2025 4:01 PM EDT  Workstation ID: [id number]    XR Femur 2 View Right  Result Date: 7/25/2025  XR FEMUR 2 VW RIGHT Date of Exam: 7/25/2025 3:39 PM EDT Indication: pain/swelling lateral side pain Comparison: Pelvis/right hip 7/25/2025 Findings: Evaluation of the right femur demonstrates previous surgical fixation of a femoral shaft proximal fracture with lateral surgical plate and screws. Fracture through the surgical plate is noted. It is unclear whether this is new or chronic. The femoral head is normally seated within its respective acetabulum with degenerative arthrosis and joint space narrowing. There are degenerative changes at the level the knee with advanced medial and lateral compartment joint space narrowing. Mild soft tissue prominence along the right lateral flank and buttock region is noted.     Impression: 1. No acute osseous injury to the right femur. 2. Previous surgical fixation of a proximal femoral shaft fracture with lateral surgical plate and screws. There is a transverse fracture through the surgical plate. No previous imaging available so it is unclear whether this represents acute or chronic finding. Electronically Signed: Ann Dooley MD  7/25/2025 3:50 PM EDT  Workstation ID: [id number]    CT Abdomen Pelvis With Contrast  Result Date: 7/25/2025  CT ABDOMEN PELVIS W CONTRAST Date of  Exam: 7/25/2025 3:36 PM EDT Indication: fall/bruising to low back. Comparison: CT abdomen and pelvis 7/16/2025 Technique: Axial CT images were obtained of the abdomen and pelvis after the uneventful intravenous administration of iodinated contrast. Reconstructed coronal and sagittal images were also obtained. Automated exposure control and iterative construction methods were used. Findings: LUNG BASES: The heart is prominent in size. Coronary artery calcifications are present. LIVER:  Unremarkable parenchyma without focal lesion. BILIARY/GALLBLADDER: There are layering calcified gallstones. SPLEEN:  Unremarkable PANCREAS:  Unremarkable ADRENAL:  Unremarkable KIDNEYS:  Unremarkable parenchyma with no solid mass identified. No obstruction.  No calculus identified. There is a cyst along the lower pole of the right kidney measuring 2.7 cm. GASTROINTESTINAL/MESENTERY: Evaluation of the gastrointestinal tract demonstrates diverticulosis without CT evidence for acute diverticulitis. No obstructive changes noted. The appendix is normal in caliber. MESENTERIC VESSELS:  Patent. AORTA/IVC: There is calcified atherosclerotic disease of the abdominal aorta. There is greater than 50% stenosis involving the proximal left internal iliac artery without evidence for complete occlusion. RETROPERITONEUM/LYMPH NODES: Shotty retroperitoneal and mesenteric lymph nodes without pathologic lymphadenopathy noted. REPRODUCTIVE:  Unremarkable BLADDER:  Unremarkable OSSEUS STRUCTURES: Chronic appearing compression injury is noted along the superior endplate of L3 with intervertebral disc base narrowing. The femoral heads are symmetric in appearance well seated within the respective acetabulum. No evidence for sacral  insufficiency fracture. The coccyx appears intact. Superior and inferior pubic rami are intact. There is subcutaneous edema and stranding in the buttock region bilaterally. Along the posterior margin of the sacrum right of midline  there is a soft tissue  hematoma measuring 2.9 x 4.8 cm. Developed a developing hematoma left of midline is also suspected measuring 1.7 x 2.3 cm. Stranding and edema extends along the upper thighs bilaterally.     Impression: 1. Soft tissue hematomas posterior to the body of the sacrum right of midline measuring up to 2.9 x 4.8 cm with soft tissue edema and stranding in the buttock region and upper thighs bilaterally; however no evidence for acute osseous injury. 2. No acute visceral organ injury in the abdomen or pelvis. 3. Cholelithiasis. Electronically Signed: Ann Dooley MD  7/25/2025 3:48 PM EDT  Workstation ID: [id number]    XR Chest 1 View  Result Date: 7/25/2025  XR CHEST 1 VW Date of Exam: 7/25/2025 2:49 PM EDT Indication: Weak/Dizzy/AMS triage protocol Comparison: June 2025 Findings: Lungs are clear. Heart size is stable mildly prominent. The aorta is mildly elongated and calcified.     Impression: No acute cardiopulmonary disease Electronically Signed: Bienvenido Mullen MD  7/25/2025 3:13 PM EDT  Workstation ID: [id number]    XR Hip With or Without Pelvis 2 - 3 View Right  Result Date: 7/25/2025  XR HIP W OR WO PELVIS 2-3 VIEW RIGHT Date of Exam: 7/25/2025 2:49 PM EDT Indication: fall Comparison: CT abdomen pelvis 7/16/2025 Findings: No acute fracture or dislocation. Moderate bilateral hip and sacroiliac joint osteoarthrosis. Right femoral fixation hardware. Lower lumbar degenerative changes, incompletely imaged and evaluated. No focal soft tissue abnormality appreciated.     Impression: No radiographic evidence of an acute fracture or dislocation. Electronically Signed: Roberto Phan MD  7/25/2025 3:12 PM EDT  Workstation ID: [id number]      MDM:    Procedures    Labs were collected in the emergency department and all labs were reviewed and interpreted by me.  X-ray were performed in the emergency department and all X-ray impressions were independently interpreted by me.  An EKG was performed and the  EKG was interpreted by me.  CT scan was performed in the emergency department and the CT scan radiology impression was interpreted by me.                     Ezequiel De Dios DO  23:36 EDT  07/25/25         Ezequiel De Dios,   07/25/25 2336       Ezequiel De Dios,   07/25/25 2337

## 2025-07-25 NOTE — DISCHARGE INSTRUCTIONS
Your lab work today shows you are little dehydrated.  You have been given some fluids during your ED visit hemoglobin today was 8.2 and your fecal occult test was negative.  CT of your head negative.  CT of your abdomen showing you have gallstones.  Hip x-ray negative.  Your femur x-ray negative for any fractures or dislocations but it does show that your surgical plate has a fracture through it.  You will need to follow-up with your Ortho.  He is use your walker at all times when you are up and ambulating.  Apply ice to the area of bruising 15 to 20 minutes 2-3 times a day and take Tylenol for pain.    Given you contact information for orthopedics.  Please call your PCP on Monday to schedule follow-up appointment

## 2025-07-28 ENCOUNTER — APPOINTMENT (OUTPATIENT)
Dept: GENERAL RADIOLOGY | Facility: HOSPITAL | Age: 85
DRG: 812 | End: 2025-07-28
Payer: MEDICARE

## 2025-07-28 ENCOUNTER — ANESTHESIA EVENT (OUTPATIENT)
Dept: GASTROENTEROLOGY | Facility: HOSPITAL | Age: 85
End: 2025-07-28
Payer: MEDICARE

## 2025-07-28 ENCOUNTER — APPOINTMENT (OUTPATIENT)
Dept: CT IMAGING | Facility: HOSPITAL | Age: 85
DRG: 812 | End: 2025-07-28
Payer: MEDICARE

## 2025-07-28 ENCOUNTER — HOSPITAL ENCOUNTER (INPATIENT)
Facility: HOSPITAL | Age: 85
LOS: 7 days | Discharge: HOME HEALTH | DRG: 812 | End: 2025-08-04
Attending: EMERGENCY MEDICINE | Admitting: INTERNAL MEDICINE
Payer: MEDICARE

## 2025-07-28 DIAGNOSIS — D62 ACUTE BLOOD LOSS ANEMIA: Primary | ICD-10-CM

## 2025-07-28 DIAGNOSIS — K62.5 RECTAL BLEEDING: ICD-10-CM

## 2025-07-28 DIAGNOSIS — S30.0XXA TRAUMATIC HEMATOMA OF LOWER BACK, INITIAL ENCOUNTER: ICD-10-CM

## 2025-07-28 DIAGNOSIS — Z79.01 CHRONIC ANTICOAGULATION: ICD-10-CM

## 2025-07-28 DIAGNOSIS — Z78.9 DECREASED ACTIVITIES OF DAILY LIVING (ADL): ICD-10-CM

## 2025-07-28 DIAGNOSIS — S22.31XA CLOSED FRACTURE OF ONE RIB OF RIGHT SIDE, INITIAL ENCOUNTER: ICD-10-CM

## 2025-07-28 DIAGNOSIS — R26.2 DIFFICULTY WALKING: ICD-10-CM

## 2025-07-28 PROBLEM — R53.1 WEAKNESS: Status: ACTIVE | Noted: 2025-07-28

## 2025-07-28 LAB
ABO GROUP BLD: NORMAL
ABO GROUP BLD: NORMAL
ALBUMIN SERPL-MCNC: 3.8 G/DL (ref 3.5–5.2)
ALBUMIN/GLOB SERPL: 1.4 G/DL
ALP SERPL-CCNC: 109 U/L (ref 39–117)
ALT SERPL W P-5'-P-CCNC: <5 U/L (ref 1–41)
ANION GAP SERPL CALCULATED.3IONS-SCNC: 9.8 MMOL/L (ref 5–15)
ANISOCYTOSIS BLD QL: NORMAL
AST SERPL-CCNC: 27 U/L (ref 1–40)
BACTERIA UR QL AUTO: ABNORMAL /HPF
BASOPHILS # BLD AUTO: 0.02 10*3/MM3 (ref 0–0.2)
BASOPHILS NFR BLD AUTO: 0.3 % (ref 0–1.5)
BILIRUB SERPL-MCNC: 1.1 MG/DL (ref 0–1.2)
BILIRUB UR QL STRIP: NEGATIVE
BLD GP AB SCN SERPL QL: NEGATIVE
BUN SERPL-MCNC: 16.9 MG/DL (ref 8–23)
BUN/CREAT SERPL: 15.4 (ref 7–25)
CALCIUM SPEC-SCNC: 9.4 MG/DL (ref 8.6–10.5)
CHLORIDE SERPL-SCNC: 102 MMOL/L (ref 98–107)
CLARITY UR: CLEAR
CO2 SERPL-SCNC: 27.2 MMOL/L (ref 22–29)
COLOR UR: YELLOW
CREAT SERPL-MCNC: 1.1 MG/DL (ref 0.76–1.27)
DEPRECATED RDW RBC AUTO: 53.2 FL (ref 37–54)
EGFRCR SERPLBLD CKD-EPI 2021: 66.2 ML/MIN/1.73
EOSINOPHIL # BLD AUTO: 0.27 10*3/MM3 (ref 0–0.4)
EOSINOPHIL NFR BLD AUTO: 4.4 % (ref 0.3–6.2)
ERYTHROCYTE [DISTWIDTH] IN BLOOD BY AUTOMATED COUNT: 14 % (ref 12.3–15.4)
GEN 5 1HR TROPONIN T REFLEX: 29 NG/L
GLOBULIN UR ELPH-MCNC: 2.7 GM/DL
GLUCOSE BLDC GLUCOMTR-MCNC: 104 MG/DL (ref 70–99)
GLUCOSE SERPL-MCNC: 104 MG/DL (ref 65–99)
GLUCOSE UR STRIP-MCNC: NEGATIVE MG/DL
HCT VFR BLD AUTO: 21.2 % (ref 37.5–51)
HEMOCCULT STL QL IA: NEGATIVE
HGB BLD-MCNC: 6.8 G/DL (ref 13–17.7)
HGB UR QL STRIP.AUTO: NEGATIVE
HOLD SPECIMEN: NORMAL
HOLD SPECIMEN: NORMAL
HYALINE CASTS UR QL AUTO: ABNORMAL /LPF
IMM GRANULOCYTES # BLD AUTO: 0.04 10*3/MM3 (ref 0–0.05)
IMM GRANULOCYTES NFR BLD AUTO: 0.7 % (ref 0–0.5)
INR PPP: 2.88 (ref 0.86–1.15)
IRON 24H UR-MRATE: 45 MCG/DL (ref 59–158)
IRON SATN MFR SERPL: 16 % (ref 20–50)
KETONES UR QL STRIP: ABNORMAL
LEUKOCYTE ESTERASE UR QL STRIP.AUTO: ABNORMAL
LYMPHOCYTES # BLD AUTO: 1.6 10*3/MM3 (ref 0.7–3.1)
LYMPHOCYTES NFR BLD AUTO: 26.2 % (ref 19.6–45.3)
MACROCYTES BLD QL SMEAR: NORMAL
MAGNESIUM SERPL-MCNC: 2.1 MG/DL (ref 1.6–2.4)
MCH RBC QN AUTO: 34.5 PG (ref 26.6–33)
MCHC RBC AUTO-ENTMCNC: 32.1 G/DL (ref 31.5–35.7)
MCV RBC AUTO: 107.6 FL (ref 79–97)
MONOCYTES # BLD AUTO: 0.59 10*3/MM3 (ref 0.1–0.9)
MONOCYTES NFR BLD AUTO: 9.7 % (ref 5–12)
NEUTROPHILS NFR BLD AUTO: 3.59 10*3/MM3 (ref 1.7–7)
NEUTROPHILS NFR BLD AUTO: 58.7 % (ref 42.7–76)
NITRITE UR QL STRIP: NEGATIVE
NRBC BLD AUTO-RTO: 0 /100 WBC (ref 0–0.2)
PH UR STRIP.AUTO: 5.5 [PH] (ref 5–8)
PLATELET # BLD AUTO: 155 10*3/MM3 (ref 140–450)
PMV BLD AUTO: 9.5 FL (ref 6–12)
POTASSIUM SERPL-SCNC: 3.9 MMOL/L (ref 3.5–5.2)
PROT SERPL-MCNC: 6.5 G/DL (ref 6–8.5)
PROT UR QL STRIP: ABNORMAL
PROTHROMBIN TIME: 31.7 SECONDS (ref 11.8–14.9)
RBC # BLD AUTO: 1.97 10*6/MM3 (ref 4.14–5.8)
RBC # UR STRIP: ABNORMAL /HPF
REF LAB TEST METHOD: ABNORMAL
RH BLD: POSITIVE
RH BLD: POSITIVE
SMALL PLATELETS BLD QL SMEAR: ADEQUATE
SODIUM SERPL-SCNC: 139 MMOL/L (ref 136–145)
SP GR UR STRIP: >1.03 (ref 1–1.03)
SQUAMOUS #/AREA URNS HPF: ABNORMAL /HPF
T&S EXPIRATION DATE: NORMAL
TIBC SERPL-MCNC: 285 MCG/DL (ref 298–536)
TRANSFERRIN SERPL-MCNC: 191 MG/DL (ref 200–360)
TROPONIN T % DELTA: -6
TROPONIN T NUMERIC DELTA: -2 NG/L
TROPONIN T SERPL HS-MCNC: 31 NG/L
UROBILINOGEN UR QL STRIP: ABNORMAL
WBC # UR STRIP: ABNORMAL /HPF
WBC MORPH BLD: NORMAL
WBC NRBC COR # BLD AUTO: 6.11 10*3/MM3 (ref 3.4–10.8)
WHOLE BLOOD HOLD COAG: NORMAL
WHOLE BLOOD HOLD SPECIMEN: NORMAL

## 2025-07-28 PROCEDURE — 84466 ASSAY OF TRANSFERRIN: CPT | Performed by: INTERNAL MEDICINE

## 2025-07-28 PROCEDURE — 71045 X-RAY EXAM CHEST 1 VIEW: CPT

## 2025-07-28 PROCEDURE — 85025 COMPLETE CBC W/AUTO DIFF WBC: CPT | Performed by: EMERGENCY MEDICINE

## 2025-07-28 PROCEDURE — 25810000003 SODIUM CHLORIDE 0.9 % SOLUTION: Performed by: EMERGENCY MEDICINE

## 2025-07-28 PROCEDURE — 86923 COMPATIBILITY TEST ELECTRIC: CPT

## 2025-07-28 PROCEDURE — 99285 EMERGENCY DEPT VISIT HI MDM: CPT

## 2025-07-28 PROCEDURE — 25510000001 IOPAMIDOL PER 1 ML: Performed by: EMERGENCY MEDICINE

## 2025-07-28 PROCEDURE — 86900 BLOOD TYPING SEROLOGIC ABO: CPT

## 2025-07-28 PROCEDURE — 82274 ASSAY TEST FOR BLOOD FECAL: CPT | Performed by: EMERGENCY MEDICINE

## 2025-07-28 PROCEDURE — 86850 RBC ANTIBODY SCREEN: CPT | Performed by: EMERGENCY MEDICINE

## 2025-07-28 PROCEDURE — 83735 ASSAY OF MAGNESIUM: CPT | Performed by: EMERGENCY MEDICINE

## 2025-07-28 PROCEDURE — 74177 CT ABD & PELVIS W/CONTRAST: CPT

## 2025-07-28 PROCEDURE — 36415 COLL VENOUS BLD VENIPUNCTURE: CPT

## 2025-07-28 PROCEDURE — 85007 BL SMEAR W/DIFF WBC COUNT: CPT | Performed by: EMERGENCY MEDICINE

## 2025-07-28 PROCEDURE — 86900 BLOOD TYPING SEROLOGIC ABO: CPT | Performed by: EMERGENCY MEDICINE

## 2025-07-28 PROCEDURE — 25010000002 FUROSEMIDE PER 20 MG: Performed by: INTERNAL MEDICINE

## 2025-07-28 PROCEDURE — 81001 URINALYSIS AUTO W/SCOPE: CPT | Performed by: EMERGENCY MEDICINE

## 2025-07-28 PROCEDURE — 84484 ASSAY OF TROPONIN QUANT: CPT | Performed by: EMERGENCY MEDICINE

## 2025-07-28 PROCEDURE — 99222 1ST HOSP IP/OBS MODERATE 55: CPT

## 2025-07-28 PROCEDURE — 86901 BLOOD TYPING SEROLOGIC RH(D): CPT

## 2025-07-28 PROCEDURE — 25810000003 LACTATED RINGERS PER 1000 ML

## 2025-07-28 PROCEDURE — 86901 BLOOD TYPING SEROLOGIC RH(D): CPT | Performed by: EMERGENCY MEDICINE

## 2025-07-28 PROCEDURE — 36430 TRANSFUSION BLD/BLD COMPNT: CPT

## 2025-07-28 PROCEDURE — P9016 RBC LEUKOCYTES REDUCED: HCPCS

## 2025-07-28 PROCEDURE — 93005 ELECTROCARDIOGRAM TRACING: CPT

## 2025-07-28 PROCEDURE — 83540 ASSAY OF IRON: CPT | Performed by: INTERNAL MEDICINE

## 2025-07-28 PROCEDURE — 80053 COMPREHEN METABOLIC PANEL: CPT | Performed by: EMERGENCY MEDICINE

## 2025-07-28 PROCEDURE — 85610 PROTHROMBIN TIME: CPT | Performed by: EMERGENCY MEDICINE

## 2025-07-28 PROCEDURE — 93010 ELECTROCARDIOGRAM REPORT: CPT | Performed by: INTERNAL MEDICINE

## 2025-07-28 PROCEDURE — 82948 REAGENT STRIP/BLOOD GLUCOSE: CPT

## 2025-07-28 PROCEDURE — 93005 ELECTROCARDIOGRAM TRACING: CPT | Performed by: EMERGENCY MEDICINE

## 2025-07-28 RX ORDER — FAMOTIDINE 20 MG/1
40 TABLET, FILM COATED ORAL DAILY
Status: DISCONTINUED | OUTPATIENT
Start: 2025-07-28 | End: 2025-08-04 | Stop reason: HOSPADM

## 2025-07-28 RX ORDER — ACETAMINOPHEN 325 MG/1
650 TABLET ORAL EVERY 4 HOURS PRN
Status: DISCONTINUED | OUTPATIENT
Start: 2025-07-28 | End: 2025-08-02 | Stop reason: SDUPTHER

## 2025-07-28 RX ORDER — ONDANSETRON 2 MG/ML
4 INJECTION INTRAMUSCULAR; INTRAVENOUS EVERY 6 HOURS PRN
Status: DISCONTINUED | OUTPATIENT
Start: 2025-07-28 | End: 2025-08-04 | Stop reason: HOSPADM

## 2025-07-28 RX ORDER — ACETAMINOPHEN 160 MG/5ML
650 SOLUTION ORAL EVERY 4 HOURS PRN
Status: DISCONTINUED | OUTPATIENT
Start: 2025-07-28 | End: 2025-08-04 | Stop reason: HOSPADM

## 2025-07-28 RX ORDER — ALPRAZOLAM 0.25 MG
0.5 TABLET ORAL EVERY 8 HOURS PRN
Status: DISPENSED | OUTPATIENT
Start: 2025-07-28 | End: 2025-08-04

## 2025-07-28 RX ORDER — IOPAMIDOL 755 MG/ML
100 INJECTION, SOLUTION INTRAVASCULAR
Status: COMPLETED | OUTPATIENT
Start: 2025-07-28 | End: 2025-07-28

## 2025-07-28 RX ORDER — FUROSEMIDE 10 MG/ML
40 INJECTION INTRAMUSCULAR; INTRAVENOUS ONCE
Status: COMPLETED | OUTPATIENT
Start: 2025-07-28 | End: 2025-07-28

## 2025-07-28 RX ORDER — BISACODYL 10 MG
10 SUPPOSITORY, RECTAL RECTAL DAILY PRN
Status: DISCONTINUED | OUTPATIENT
Start: 2025-07-28 | End: 2025-08-04 | Stop reason: HOSPADM

## 2025-07-28 RX ORDER — POLYETHYLENE GLYCOL 3350 17 G/17G
17 POWDER, FOR SOLUTION ORAL DAILY PRN
Status: DISCONTINUED | OUTPATIENT
Start: 2025-07-28 | End: 2025-08-04 | Stop reason: HOSPADM

## 2025-07-28 RX ORDER — DILTIAZEM HYDROCHLORIDE 120 MG/1
120 CAPSULE, EXTENDED RELEASE ORAL DAILY
Qty: 90 CAPSULE | Refills: 3 | Status: SHIPPED | OUTPATIENT
Start: 2025-07-28

## 2025-07-28 RX ORDER — AMOXICILLIN 250 MG
2 CAPSULE ORAL 2 TIMES DAILY PRN
Status: DISCONTINUED | OUTPATIENT
Start: 2025-07-28 | End: 2025-08-04 | Stop reason: HOSPADM

## 2025-07-28 RX ORDER — POTASSIUM CHLORIDE 750 MG/1
10 TABLET, EXTENDED RELEASE ORAL DAILY
Qty: 90 TABLET | Refills: 3 | Status: SHIPPED | OUTPATIENT
Start: 2025-07-28

## 2025-07-28 RX ORDER — BISACODYL 5 MG/1
5 TABLET, DELAYED RELEASE ORAL DAILY PRN
Status: DISCONTINUED | OUTPATIENT
Start: 2025-07-28 | End: 2025-08-04 | Stop reason: HOSPADM

## 2025-07-28 RX ORDER — ALUMINA, MAGNESIA, AND SIMETHICONE 2400; 2400; 240 MG/30ML; MG/30ML; MG/30ML
15 SUSPENSION ORAL EVERY 6 HOURS PRN
Status: DISCONTINUED | OUTPATIENT
Start: 2025-07-28 | End: 2025-08-04 | Stop reason: HOSPADM

## 2025-07-28 RX ORDER — SODIUM CHLORIDE, SODIUM LACTATE, POTASSIUM CHLORIDE, CALCIUM CHLORIDE 600; 310; 30; 20 MG/100ML; MG/100ML; MG/100ML; MG/100ML
30 INJECTION, SOLUTION INTRAVENOUS CONTINUOUS
Status: ACTIVE | OUTPATIENT
Start: 2025-07-28 | End: 2025-07-28

## 2025-07-28 RX ORDER — ACETAMINOPHEN 650 MG/1
650 SUPPOSITORY RECTAL EVERY 4 HOURS PRN
Status: DISCONTINUED | OUTPATIENT
Start: 2025-07-28 | End: 2025-08-04 | Stop reason: HOSPADM

## 2025-07-28 RX ORDER — SODIUM CHLORIDE 0.9 % (FLUSH) 0.9 %
10 SYRINGE (ML) INJECTION AS NEEDED
Status: DISCONTINUED | OUTPATIENT
Start: 2025-07-28 | End: 2025-08-04 | Stop reason: HOSPADM

## 2025-07-28 RX ADMIN — ACETAMINOPHEN 650 MG: 650 SOLUTION ORAL at 15:42

## 2025-07-28 RX ADMIN — SODIUM CHLORIDE, POTASSIUM CHLORIDE, SODIUM LACTATE AND CALCIUM CHLORIDE 30 ML/HR: 600; 310; 30; 20 INJECTION, SOLUTION INTRAVENOUS at 18:20

## 2025-07-28 RX ADMIN — FUROSEMIDE 40 MG: 10 INJECTION, SOLUTION INTRAMUSCULAR; INTRAVENOUS at 15:42

## 2025-07-28 RX ADMIN — FAMOTIDINE 40 MG: 20 TABLET, FILM COATED ORAL at 11:07

## 2025-07-28 RX ADMIN — SODIUM CHLORIDE 500 ML: 9 INJECTION, SOLUTION INTRAVENOUS at 09:19

## 2025-07-28 RX ADMIN — POLYETHYLENE GLYCOL 3350, SODIUM SULFATE ANHYDROUS, SODIUM BICARBONATE, SODIUM CHLORIDE, POTASSIUM CHLORIDE 4000 ML: 236; 22.74; 6.74; 5.86; 2.97 POWDER, FOR SOLUTION ORAL at 15:42

## 2025-07-28 RX ADMIN — IOPAMIDOL 100 ML: 755 INJECTION, SOLUTION INTRAVENOUS at 09:46

## 2025-07-28 NOTE — ANESTHESIA PREPROCEDURE EVALUATION
Anesthesia Evaluation     Patient summary reviewed and Nursing notes reviewed   NPO Solid Status: > 8 hours  NPO Liquid Status: > 2 hours           Airway   Mallampati: II  TM distance: >3 FB  Neck ROM: full  No difficulty expected  Dental    (+) lower dentures, partials and upper dentures        Pulmonary - normal exam    breath sounds clear to auscultation  (+) COPD mild,shortness of breath, sleep apnea  Cardiovascular - normal exam    ECG reviewed  PT is on anticoagulation therapy  Rhythm: regular  Rate: normal    (+) hypertension, dysrhythmias Atrial Fib      Neuro/Psych  (+) Parkinson's disease, dizziness/light headedness, weakness, psychiatric history (dementia) Anxiety, dementia  GI/Hepatic/Renal/Endo    (+) GERD, GI bleeding lower , thyroid problem hypothyroidism    Musculoskeletal     Abdominal  - normal exam   Substance History      OB/GYN          Other        ROS/Med Hx Other: Anemia, rectal bleeding    Last dose Xarelto?     EKG 07/28/25: HR 78,   Atrial fibrillation  Borderline low voltage, extremity leads  Abnormal R-wave progression, late transition  Abnormal lateral Q waves      Cards clearance per Dr. Ortega with acceptable risks on 06/03/25   ECHO 09/22/23:   ·  Left ventricular systolic function is normal. Calculated left ventricular EF = 55.3%  ·  Left ventricular diastolic function was indeterminate.  ·  The right ventricular cavity is borderline dilated.  ·  The left atrial cavity is moderately dilated.  ·  The right atrial cavity is moderately  dilated.  ·  There is calcification of the aortic valve.  ·  Moderate tricuspid valve regurgitation is present.  ·  Estimated right ventricular systolic pressure from tricuspid regurgitation is mildly elevated (35-45 mmHg).    H&H = 8.8 / 26.8 (today)    A&O x 3 today                Anesthesia Plan    ASA 3     general   total IV anesthesia  (Total IV Anesthesia    Patient understands anesthesia not responsible for dental damage.      Discussed risks  with pt including aspiration, allergic reactions, apnea, advanced airway placement. Pt verbalized understanding. All questions answered.     )  intravenous induction     Anesthetic plan, risks, benefits, and alternatives have been provided, discussed and informed consent has been obtained with: patient.  Pre-procedure education provided  Plan discussed with CRNA.      CODE STATUS:    Code Status (Patient has no pulse and is not breathing): CPR (Attempt to Resuscitate)  Medical Interventions (Patient has pulse or is breathing): Full Support

## 2025-07-29 ENCOUNTER — ANESTHESIA (OUTPATIENT)
Dept: GASTROENTEROLOGY | Facility: HOSPITAL | Age: 85
End: 2025-07-29
Payer: MEDICARE

## 2025-07-29 LAB
DEPRECATED RDW RBC AUTO: 59.3 FL (ref 37–54)
ERYTHROCYTE [DISTWIDTH] IN BLOOD BY AUTOMATED COUNT: 16.9 % (ref 12.3–15.4)
GLUCOSE BLDC GLUCOMTR-MCNC: 159 MG/DL (ref 70–99)
HCT VFR BLD AUTO: 26.5 % (ref 37.5–51)
HCT VFR BLD AUTO: 26.8 % (ref 37.5–51)
HGB BLD-MCNC: 8.8 G/DL (ref 13–17.7)
HGB BLD-MCNC: 8.8 G/DL (ref 13–17.7)
MCH RBC QN AUTO: 32.4 PG (ref 26.6–33)
MCHC RBC AUTO-ENTMCNC: 32.8 G/DL (ref 31.5–35.7)
MCV RBC AUTO: 98.5 FL (ref 79–97)
PLATELET # BLD AUTO: 160 10*3/MM3 (ref 140–450)
PMV BLD AUTO: 8.9 FL (ref 6–12)
RBC # BLD AUTO: 2.72 10*6/MM3 (ref 4.14–5.8)
WBC NRBC COR # BLD AUTO: 7.68 10*3/MM3 (ref 3.4–10.8)

## 2025-07-29 PROCEDURE — 45385 COLONOSCOPY W/LESION REMOVAL: CPT | Performed by: INTERNAL MEDICINE

## 2025-07-29 PROCEDURE — 0DBK8ZX EXCISION OF ASCENDING COLON, VIA NATURAL OR ARTIFICIAL OPENING ENDOSCOPIC, DIAGNOSTIC: ICD-10-PCS | Performed by: INTERNAL MEDICINE

## 2025-07-29 PROCEDURE — 25010000002 ONDANSETRON PER 1 MG: Performed by: INTERNAL MEDICINE

## 2025-07-29 PROCEDURE — 82948 REAGENT STRIP/BLOOD GLUCOSE: CPT

## 2025-07-29 PROCEDURE — 25810000003 LACTATED RINGERS PER 1000 ML: Performed by: NURSE ANESTHETIST, CERTIFIED REGISTERED

## 2025-07-29 PROCEDURE — 25010000002 NA FERRIC GLUC CPLX PER 12.5 MG: Performed by: INTERNAL MEDICINE

## 2025-07-29 PROCEDURE — 97165 OT EVAL LOW COMPLEX 30 MIN: CPT

## 2025-07-29 PROCEDURE — 85027 COMPLETE CBC AUTOMATED: CPT | Performed by: INTERNAL MEDICINE

## 2025-07-29 PROCEDURE — 25010000002 LIDOCAINE PF 2% 2 % SOLUTION: Performed by: NURSE ANESTHETIST, CERTIFIED REGISTERED

## 2025-07-29 PROCEDURE — 88305 TISSUE EXAM BY PATHOLOGIST: CPT | Performed by: INTERNAL MEDICINE

## 2025-07-29 PROCEDURE — 25810000003 SODIUM CHLORIDE 0.9 % SOLUTION: Performed by: INTERNAL MEDICINE

## 2025-07-29 PROCEDURE — 85014 HEMATOCRIT: CPT | Performed by: FAMILY MEDICINE

## 2025-07-29 PROCEDURE — 25010000002 PROPOFOL 10 MG/ML EMULSION: Performed by: NURSE ANESTHETIST, CERTIFIED REGISTERED

## 2025-07-29 PROCEDURE — 85018 HEMOGLOBIN: CPT | Performed by: FAMILY MEDICINE

## 2025-07-29 DEVICE — DEV CLIP ENDO RESOLUTION360 CONTRL ROT 235CM: Type: IMPLANTABLE DEVICE | Site: ASCENDING COLON | Status: FUNCTIONAL

## 2025-07-29 RX ORDER — SODIUM CHLORIDE 0.9 % (FLUSH) 0.9 %
10 SYRINGE (ML) INJECTION AS NEEDED
Status: DISCONTINUED | OUTPATIENT
Start: 2025-07-29 | End: 2025-07-29 | Stop reason: HOSPADM

## 2025-07-29 RX ORDER — POTASSIUM CHLORIDE 750 MG/1
10 TABLET, EXTENDED RELEASE ORAL DAILY
Qty: 90 TABLET | Refills: 3 | OUTPATIENT
Start: 2025-07-29

## 2025-07-29 RX ORDER — LIDOCAINE HYDROCHLORIDE 20 MG/ML
INJECTION, SOLUTION EPIDURAL; INFILTRATION; INTRACAUDAL; PERINEURAL AS NEEDED
Status: DISCONTINUED | OUTPATIENT
Start: 2025-07-29 | End: 2025-07-29 | Stop reason: SURG

## 2025-07-29 RX ORDER — SODIUM CHLORIDE, SODIUM LACTATE, POTASSIUM CHLORIDE, CALCIUM CHLORIDE 600; 310; 30; 20 MG/100ML; MG/100ML; MG/100ML; MG/100ML
30 INJECTION, SOLUTION INTRAVENOUS CONTINUOUS
Status: DISCONTINUED | OUTPATIENT
Start: 2025-07-29 | End: 2025-07-29

## 2025-07-29 RX ORDER — PROPOFOL 10 MG/ML
VIAL (ML) INTRAVENOUS AS NEEDED
Status: DISCONTINUED | OUTPATIENT
Start: 2025-07-29 | End: 2025-07-29 | Stop reason: SURG

## 2025-07-29 RX ADMIN — ACETAMINOPHEN 650 MG: 325 TABLET ORAL at 23:23

## 2025-07-29 RX ADMIN — PROPOFOL 125 MCG/KG/MIN: 10 INJECTION, EMULSION INTRAVENOUS at 11:48

## 2025-07-29 RX ADMIN — PROPOFOL 40 MG: 10 INJECTION, EMULSION INTRAVENOUS at 11:47

## 2025-07-29 RX ADMIN — ONDANSETRON 4 MG: 2 INJECTION INTRAMUSCULAR; INTRAVENOUS at 12:57

## 2025-07-29 RX ADMIN — ALPRAZOLAM 0.5 MG: 0.25 TABLET ORAL at 23:22

## 2025-07-29 RX ADMIN — LIDOCAINE HYDROCHLORIDE 60 MG: 20 INJECTION, SOLUTION EPIDURAL; INFILTRATION; INTRACAUDAL; PERINEURAL at 11:47

## 2025-07-29 RX ADMIN — SODIUM CHLORIDE, POTASSIUM CHLORIDE, SODIUM LACTATE AND CALCIUM CHLORIDE 30 ML/HR: 600; 310; 30; 20 INJECTION, SOLUTION INTRAVENOUS at 11:11

## 2025-07-29 RX ADMIN — SODIUM CHLORIDE 250 MG: 9 INJECTION, SOLUTION INTRAVENOUS at 15:36

## 2025-07-29 RX ADMIN — ACETAMINOPHEN 650 MG: 325 TABLET ORAL at 15:36

## 2025-07-29 RX ADMIN — FAMOTIDINE 40 MG: 20 TABLET, FILM COATED ORAL at 09:46

## 2025-07-29 NOTE — ANESTHESIA POSTPROCEDURE EVALUATION
Patient: Justyn Galo    Procedure Summary       Date: 07/29/25 Room / Location: [facility] ENDOSCOPY 3 / [facility] ENDOSCOPY    Anesthesia Start: 1142 Anesthesia Stop: 1229    Procedure: COLONOSCOPY with cold snare polypectomy, clip application x1 Diagnosis:       Acute blood loss anemia      Rectal bleeding      (Acute blood loss anemia [D62])      (Rectal bleeding [K62.5])    Surgeons: Juanita Ryan MD Provider: Cecy Sánchez CRNA    Anesthesia Type: general ASA Status: 3            Anesthesia Type: general    Vitals  Vitals Value Taken Time   /83 07/29/25 12:37   Temp 36.3 °C (97.3 °F) 07/29/25 12:27   Pulse 109 07/29/25 12:41   Resp 14 07/29/25 12:37   SpO2 96 % 07/29/25 12:41   Vitals shown include unfiled device data.        Post Anesthesia Care and Evaluation    Post-procedure mental status: acceptable.  Pain management: satisfactory to patient    Airway patency: patent  Anesthetic complications: No anesthetic complications    Cardiovascular status: acceptable  Respiratory status: acceptable    Comments: Per chart review

## 2025-07-29 NOTE — TELEPHONE ENCOUNTER
DUPLICATE REQUEST.  REFUSING DUE TO THIS REASON.  REFILL WAS SENT YESTERDAY TO THE SAME REQUESTING PHARMACY.

## 2025-07-29 NOTE — ANESTHESIA POSTPROCEDURE EVALUATION
Patient: Justyn Galo    Procedure Summary       Date: 07/29/25 Room / Location: [facility] ENDOSCOPY 3 / [facility] ENDOSCOPY    Anesthesia Start: 1142 Anesthesia Stop: 1229    Procedure: COLONOSCOPY with cold snare polypectomy, clip application x1 Diagnosis:       Acute blood loss anemia      Rectal bleeding      (Acute blood loss anemia [D62])      (Rectal bleeding [K62.5])    Surgeons: Juanita Ryan MD Provider: Cecy Sánchez CRNA    Anesthesia Type: general ASA Status: 3            Anesthesia Type: general    Vitals  Vitals Value Taken Time   /83 07/29/25 12:37   Temp 36.3 °C (97.3 °F) 07/29/25 12:27   Pulse 101 07/29/25 12:37   Resp 13 07/29/25 12:32   SpO2 98 % 07/29/25 12:37   Vitals shown include unfiled device data.        Post Anesthesia Care and Evaluation    Patient location during evaluation: bedside  Patient participation: complete - patient participated  Level of consciousness: awake and alert  Pain score: 0  Pain management: adequate    Airway patency: patent  Anesthetic complications: No anesthetic complications  PONV Status: controlled  Cardiovascular status: acceptable and stable  Respiratory status: acceptable  Hydration status: acceptable

## 2025-07-30 LAB
BH BB BLOOD EXPIRATION DATE: NORMAL
BH BB BLOOD EXPIRATION DATE: NORMAL
BH BB BLOOD TYPE BARCODE: 5100
BH BB BLOOD TYPE BARCODE: 5100
BH BB DISPENSE STATUS: NORMAL
BH BB DISPENSE STATUS: NORMAL
BH BB PRODUCT CODE: NORMAL
BH BB PRODUCT CODE: NORMAL
BH BB UNIT NUMBER: NORMAL
BH BB UNIT NUMBER: NORMAL
CROSSMATCH INTERPRETATION: NORMAL
CROSSMATCH INTERPRETATION: NORMAL
CYTO UR: NORMAL
DEPRECATED RDW RBC AUTO: 59.5 FL (ref 37–54)
ERYTHROCYTE [DISTWIDTH] IN BLOOD BY AUTOMATED COUNT: 16.9 % (ref 12.3–15.4)
HCT VFR BLD AUTO: 28.1 % (ref 37.5–51)
HGB BLD-MCNC: 9.2 G/DL (ref 13–17.7)
HOLD SPECIMEN: NORMAL
LAB AP CASE REPORT: NORMAL
LAB AP CLINICAL INFORMATION: NORMAL
MCH RBC QN AUTO: 33.5 PG (ref 26.6–33)
MCHC RBC AUTO-ENTMCNC: 32.7 G/DL (ref 31.5–35.7)
MCV RBC AUTO: 102.2 FL (ref 79–97)
PATH REPORT.FINAL DX SPEC: NORMAL
PATH REPORT.GROSS SPEC: NORMAL
PLATELET # BLD AUTO: 182 10*3/MM3 (ref 140–450)
PMV BLD AUTO: 9.3 FL (ref 6–12)
QT INTERVAL: 401 MS
QTC INTERVAL: 478 MS
RBC # BLD AUTO: 2.75 10*6/MM3 (ref 4.14–5.8)
UNIT  ABO: NORMAL
UNIT  ABO: NORMAL
UNIT  RH: NORMAL
UNIT  RH: NORMAL
WBC NRBC COR # BLD AUTO: 6.89 10*3/MM3 (ref 3.4–10.8)

## 2025-07-30 PROCEDURE — 25810000003 SODIUM CHLORIDE 0.9 % SOLUTION: Performed by: INTERNAL MEDICINE

## 2025-07-30 PROCEDURE — 97161 PT EVAL LOW COMPLEX 20 MIN: CPT

## 2025-07-30 PROCEDURE — 85027 COMPLETE CBC AUTOMATED: CPT | Performed by: INTERNAL MEDICINE

## 2025-07-30 PROCEDURE — 25010000002 NA FERRIC GLUC CPLX PER 12.5 MG: Performed by: INTERNAL MEDICINE

## 2025-07-30 RX ADMIN — FAMOTIDINE 40 MG: 20 TABLET, FILM COATED ORAL at 09:33

## 2025-07-30 RX ADMIN — SODIUM CHLORIDE 250 MG: 9 INJECTION, SOLUTION INTRAVENOUS at 09:32

## 2025-07-30 RX ADMIN — ACETAMINOPHEN 650 MG: 325 TABLET ORAL at 20:06

## 2025-07-31 LAB
DEPRECATED RDW RBC AUTO: 60.3 FL (ref 37–54)
ERYTHROCYTE [DISTWIDTH] IN BLOOD BY AUTOMATED COUNT: 17 % (ref 12.3–15.4)
HCT VFR BLD AUTO: 29.4 % (ref 37.5–51)
HGB BLD-MCNC: 9.4 G/DL (ref 13–17.7)
MCH RBC QN AUTO: 33 PG (ref 26.6–33)
MCHC RBC AUTO-ENTMCNC: 32 G/DL (ref 31.5–35.7)
MCV RBC AUTO: 103.2 FL (ref 79–97)
PLATELET # BLD AUTO: 192 10*3/MM3 (ref 140–450)
PMV BLD AUTO: 9.4 FL (ref 6–12)
RBC # BLD AUTO: 2.85 10*6/MM3 (ref 4.14–5.8)
WBC NRBC COR # BLD AUTO: 7.89 10*3/MM3 (ref 3.4–10.8)

## 2025-07-31 PROCEDURE — 97110 THERAPEUTIC EXERCISES: CPT

## 2025-07-31 PROCEDURE — 85027 COMPLETE CBC AUTOMATED: CPT | Performed by: INTERNAL MEDICINE

## 2025-07-31 RX ADMIN — ACETAMINOPHEN 650 MG: 325 TABLET ORAL at 20:09

## 2025-07-31 RX ADMIN — FAMOTIDINE 40 MG: 20 TABLET, FILM COATED ORAL at 08:43

## 2025-08-01 ENCOUNTER — RESULTS FOLLOW-UP (OUTPATIENT)
Dept: GASTROENTEROLOGY | Facility: HOSPITAL | Age: 85
End: 2025-08-01
Payer: MEDICARE

## 2025-08-01 RX ADMIN — ALPRAZOLAM 0.5 MG: 0.25 TABLET ORAL at 07:55

## 2025-08-01 RX ADMIN — ALPRAZOLAM 0.5 MG: 0.25 TABLET ORAL at 19:58

## 2025-08-01 RX ADMIN — FAMOTIDINE 40 MG: 20 TABLET, FILM COATED ORAL at 07:55

## 2025-08-02 RX ORDER — FINASTERIDE 5 MG/1
5 TABLET, FILM COATED ORAL DAILY
Status: DISCONTINUED | OUTPATIENT
Start: 2025-08-03 | End: 2025-08-04 | Stop reason: HOSPADM

## 2025-08-02 RX ORDER — TAMSULOSIN HYDROCHLORIDE 0.4 MG/1
0.4 CAPSULE ORAL DAILY
Status: DISCONTINUED | OUTPATIENT
Start: 2025-08-03 | End: 2025-08-04 | Stop reason: HOSPADM

## 2025-08-02 RX ORDER — CARVEDILOL 6.25 MG/1
6.25 TABLET ORAL 2 TIMES DAILY WITH MEALS
Status: DISCONTINUED | OUTPATIENT
Start: 2025-08-02 | End: 2025-08-04 | Stop reason: HOSPADM

## 2025-08-02 RX ORDER — POTASSIUM CHLORIDE 750 MG/1
10 CAPSULE, EXTENDED RELEASE ORAL DAILY
Status: DISCONTINUED | OUTPATIENT
Start: 2025-08-03 | End: 2025-08-04 | Stop reason: HOSPADM

## 2025-08-02 RX ORDER — CARBIDOPA AND LEVODOPA 25; 100 MG/1; MG/1
2 TABLET, EXTENDED RELEASE ORAL 4 TIMES DAILY
Status: DISCONTINUED | OUTPATIENT
Start: 2025-08-03 | End: 2025-08-02 | Stop reason: ALTCHOICE

## 2025-08-02 RX ORDER — EZETIMIBE 10 MG/1
10 TABLET ORAL DAILY
Status: DISCONTINUED | OUTPATIENT
Start: 2025-08-03 | End: 2025-08-02

## 2025-08-02 RX ORDER — ALBUTEROL SULFATE 0.83 MG/ML
2.5 SOLUTION RESPIRATORY (INHALATION) EVERY 4 HOURS PRN
Status: DISCONTINUED | OUTPATIENT
Start: 2025-08-02 | End: 2025-08-04 | Stop reason: HOSPADM

## 2025-08-02 RX ORDER — CARBIDOPA AND LEVODOPA 25; 100 MG/1; MG/1
2 TABLET ORAL 4 TIMES DAILY
Status: DISCONTINUED | OUTPATIENT
Start: 2025-08-02 | End: 2025-08-04 | Stop reason: HOSPADM

## 2025-08-02 RX ORDER — LEVOTHYROXINE SODIUM 50 UG/1
50 TABLET ORAL DAILY
Status: DISCONTINUED | OUTPATIENT
Start: 2025-08-03 | End: 2025-08-04 | Stop reason: HOSPADM

## 2025-08-02 RX ORDER — MULTIPLE VITAMINS W/ MINERALS TAB 9MG-400MCG
1 TAB ORAL DAILY
Status: DISCONTINUED | OUTPATIENT
Start: 2025-08-03 | End: 2025-08-04 | Stop reason: HOSPADM

## 2025-08-02 RX ORDER — ESCITALOPRAM OXALATE 10 MG/1
20 TABLET ORAL DAILY
Status: DISCONTINUED | OUTPATIENT
Start: 2025-08-03 | End: 2025-08-04 | Stop reason: HOSPADM

## 2025-08-02 RX ORDER — ACETAMINOPHEN 500 MG
500 TABLET ORAL EVERY 6 HOURS PRN
Status: DISCONTINUED | OUTPATIENT
Start: 2025-08-02 | End: 2025-08-04 | Stop reason: HOSPADM

## 2025-08-02 RX ORDER — FUROSEMIDE 40 MG/1
40 TABLET ORAL DAILY
Status: DISCONTINUED | OUTPATIENT
Start: 2025-08-03 | End: 2025-08-04 | Stop reason: HOSPADM

## 2025-08-02 RX ORDER — MULTIVITAMIN WITH IRON
1000 TABLET ORAL DAILY
Status: DISCONTINUED | OUTPATIENT
Start: 2025-08-03 | End: 2025-08-04 | Stop reason: HOSPADM

## 2025-08-02 RX ORDER — DILTIAZEM HYDROCHLORIDE 120 MG/1
120 CAPSULE, COATED, EXTENDED RELEASE ORAL
Status: DISCONTINUED | OUTPATIENT
Start: 2025-08-03 | End: 2025-08-04 | Stop reason: HOSPADM

## 2025-08-02 RX ADMIN — ACETAMINOPHEN 650 MG: 325 TABLET ORAL at 10:50

## 2025-08-02 RX ADMIN — ALPRAZOLAM 0.5 MG: 0.25 TABLET ORAL at 19:50

## 2025-08-02 RX ADMIN — Medication 10 MG: at 23:42

## 2025-08-02 RX ADMIN — FAMOTIDINE 40 MG: 20 TABLET, FILM COATED ORAL at 10:50

## 2025-08-02 RX ADMIN — CARVEDILOL 6.25 MG: 6.25 TABLET, FILM COATED ORAL at 23:41

## 2025-08-02 RX ADMIN — CARBIDOPA AND LEVODOPA 2 TABLET: 25; 100 TABLET ORAL at 23:41

## 2025-08-03 LAB
ALBUMIN SERPL-MCNC: 3.6 G/DL (ref 3.5–5.2)
ALBUMIN/GLOB SERPL: 1.3 G/DL
ALP SERPL-CCNC: 131 U/L (ref 39–117)
ALT SERPL W P-5'-P-CCNC: <5 U/L (ref 1–41)
ANION GAP SERPL CALCULATED.3IONS-SCNC: 8.1 MMOL/L (ref 5–15)
AST SERPL-CCNC: 28 U/L (ref 1–40)
BILIRUB SERPL-MCNC: 1.2 MG/DL (ref 0–1.2)
BUN SERPL-MCNC: 21.8 MG/DL (ref 8–23)
BUN/CREAT SERPL: 18.2 (ref 7–25)
CALCIUM SPEC-SCNC: 9.4 MG/DL (ref 8.6–10.5)
CHLORIDE SERPL-SCNC: 102 MMOL/L (ref 98–107)
CO2 SERPL-SCNC: 26.9 MMOL/L (ref 22–29)
CREAT SERPL-MCNC: 1.2 MG/DL (ref 0.76–1.27)
DEPRECATED RDW RBC AUTO: 67.2 FL (ref 37–54)
EGFRCR SERPLBLD CKD-EPI 2021: 59.6 ML/MIN/1.73
ERYTHROCYTE [DISTWIDTH] IN BLOOD BY AUTOMATED COUNT: 17.4 % (ref 12.3–15.4)
GLOBULIN UR ELPH-MCNC: 2.7 GM/DL
GLUCOSE SERPL-MCNC: 101 MG/DL (ref 65–99)
HCT VFR BLD AUTO: 30.7 % (ref 37.5–51)
HGB BLD-MCNC: 9.5 G/DL (ref 13–17.7)
MCH RBC QN AUTO: 32.8 PG (ref 26.6–33)
MCHC RBC AUTO-ENTMCNC: 30.9 G/DL (ref 31.5–35.7)
MCV RBC AUTO: 105.9 FL (ref 79–97)
PLATELET # BLD AUTO: 223 10*3/MM3 (ref 140–450)
PMV BLD AUTO: 9.3 FL (ref 6–12)
POTASSIUM SERPL-SCNC: 4.6 MMOL/L (ref 3.5–5.2)
PROT SERPL-MCNC: 6.3 G/DL (ref 6–8.5)
RBC # BLD AUTO: 2.9 10*6/MM3 (ref 4.14–5.8)
SODIUM SERPL-SCNC: 137 MMOL/L (ref 136–145)
WBC NRBC COR # BLD AUTO: 8.25 10*3/MM3 (ref 3.4–10.8)

## 2025-08-03 PROCEDURE — 80053 COMPREHEN METABOLIC PANEL: CPT | Performed by: INTERNAL MEDICINE

## 2025-08-03 PROCEDURE — 85027 COMPLETE CBC AUTOMATED: CPT | Performed by: INTERNAL MEDICINE

## 2025-08-03 RX ADMIN — FUROSEMIDE 40 MG: 40 TABLET ORAL at 10:12

## 2025-08-03 RX ADMIN — CARVEDILOL 6.25 MG: 6.25 TABLET, FILM COATED ORAL at 10:12

## 2025-08-03 RX ADMIN — CARBIDOPA AND LEVODOPA 2 TABLET: 25; 100 TABLET ORAL at 20:22

## 2025-08-03 RX ADMIN — ESCITALOPRAM OXALATE 20 MG: 10 TABLET ORAL at 10:10

## 2025-08-03 RX ADMIN — Medication 1 TABLET: at 10:12

## 2025-08-03 RX ADMIN — CARVEDILOL 6.25 MG: 6.25 TABLET, FILM COATED ORAL at 18:23

## 2025-08-03 RX ADMIN — ACETAMINOPHEN 500 MG: 500 TABLET ORAL at 14:31

## 2025-08-03 RX ADMIN — CARBIDOPA AND LEVODOPA 2 TABLET: 25; 100 TABLET ORAL at 14:30

## 2025-08-03 RX ADMIN — CARBIDOPA AND LEVODOPA 2 TABLET: 25; 100 TABLET ORAL at 18:23

## 2025-08-03 RX ADMIN — TAMSULOSIN HYDROCHLORIDE 0.4 MG: 0.4 CAPSULE ORAL at 10:13

## 2025-08-03 RX ADMIN — ALPRAZOLAM 0.5 MG: 0.25 TABLET ORAL at 20:22

## 2025-08-03 RX ADMIN — FINASTERIDE 5 MG: 5 TABLET, FILM COATED ORAL at 10:14

## 2025-08-03 RX ADMIN — POTASSIUM CHLORIDE 10 MEQ: 750 CAPSULE, EXTENDED RELEASE ORAL at 10:10

## 2025-08-03 RX ADMIN — DILTIAZEM HYDROCHLORIDE 120 MG: 120 CAPSULE, COATED, EXTENDED RELEASE ORAL at 10:14

## 2025-08-03 RX ADMIN — Medication 5000 UNITS: at 10:12

## 2025-08-03 RX ADMIN — FAMOTIDINE 40 MG: 20 TABLET, FILM COATED ORAL at 10:13

## 2025-08-03 RX ADMIN — CARBIDOPA AND LEVODOPA 2 TABLET: 25; 100 TABLET ORAL at 10:13

## 2025-08-03 RX ADMIN — CYANOCOBALAMIN TAB 500 MCG 1000 MCG: 500 TAB at 10:13

## 2025-08-03 RX ADMIN — Medication 10 MG: at 20:22

## 2025-08-03 RX ADMIN — SENNOSIDES, DOCUSATE SODIUM 2 TABLET: 50; 8.6 TABLET, FILM COATED ORAL at 14:30

## 2025-08-03 RX ADMIN — LEVOTHYROXINE SODIUM 50 MCG: 0.05 TABLET ORAL at 10:13

## 2025-08-04 ENCOUNTER — READMISSION MANAGEMENT (OUTPATIENT)
Dept: CALL CENTER | Facility: HOSPITAL | Age: 85
End: 2025-08-04
Payer: MEDICARE

## 2025-08-04 VITALS
WEIGHT: 196.21 LBS | HEIGHT: 74 IN | OXYGEN SATURATION: 99 % | SYSTOLIC BLOOD PRESSURE: 99 MMHG | BODY MASS INDEX: 25.18 KG/M2 | RESPIRATION RATE: 16 BRPM | DIASTOLIC BLOOD PRESSURE: 68 MMHG | HEART RATE: 80 BPM | TEMPERATURE: 97.9 F

## 2025-08-04 PROBLEM — D62 ACUTE BLOOD LOSS ANEMIA: Status: RESOLVED | Noted: 2025-07-28 | Resolved: 2025-08-04

## 2025-08-04 RX ADMIN — ESCITALOPRAM OXALATE 20 MG: 10 TABLET ORAL at 09:10

## 2025-08-04 RX ADMIN — CARVEDILOL 6.25 MG: 6.25 TABLET, FILM COATED ORAL at 17:46

## 2025-08-04 RX ADMIN — CARBIDOPA AND LEVODOPA 2 TABLET: 25; 100 TABLET ORAL at 15:49

## 2025-08-04 RX ADMIN — FAMOTIDINE 40 MG: 20 TABLET, FILM COATED ORAL at 09:12

## 2025-08-04 RX ADMIN — CARBIDOPA AND LEVODOPA 2 TABLET: 25; 100 TABLET ORAL at 17:46

## 2025-08-04 RX ADMIN — DILTIAZEM HYDROCHLORIDE 120 MG: 120 CAPSULE, COATED, EXTENDED RELEASE ORAL at 09:11

## 2025-08-04 RX ADMIN — Medication 5000 UNITS: at 09:11

## 2025-08-04 RX ADMIN — TAMSULOSIN HYDROCHLORIDE 0.4 MG: 0.4 CAPSULE ORAL at 09:11

## 2025-08-04 RX ADMIN — Medication 10 ML: at 09:20

## 2025-08-04 RX ADMIN — POTASSIUM CHLORIDE 10 MEQ: 750 CAPSULE, EXTENDED RELEASE ORAL at 09:11

## 2025-08-04 RX ADMIN — FINASTERIDE 5 MG: 5 TABLET, FILM COATED ORAL at 09:12

## 2025-08-04 RX ADMIN — LEVOTHYROXINE SODIUM 50 MCG: 0.05 TABLET ORAL at 09:11

## 2025-08-04 RX ADMIN — Medication 1 TABLET: at 09:10

## 2025-08-04 RX ADMIN — CYANOCOBALAMIN TAB 500 MCG 1000 MCG: 500 TAB at 09:11

## 2025-08-04 RX ADMIN — CARBIDOPA AND LEVODOPA 2 TABLET: 25; 100 TABLET ORAL at 09:13

## 2025-08-04 RX ADMIN — FUROSEMIDE 40 MG: 40 TABLET ORAL at 09:11

## 2025-08-04 RX ADMIN — ACETAMINOPHEN 500 MG: 500 TABLET ORAL at 15:49

## 2025-08-04 RX ADMIN — CARVEDILOL 6.25 MG: 6.25 TABLET, FILM COATED ORAL at 09:12

## 2025-08-04 RX ADMIN — SENNOSIDES, DOCUSATE SODIUM 2 TABLET: 50; 8.6 TABLET, FILM COATED ORAL at 15:49

## 2025-08-04 NOTE — TELEPHONE ENCOUNTER
Allie from hospital called, she is going to relay to the patient to call us if still having symptoms or if they need us in the future. She said at this time labs are stable and not seeing anymore blood.

## 2025-08-04 NOTE — TELEPHONE ENCOUNTER
S/w pt wife Soledad she states he is still in the hospital and is supposed to go to Rehab, Advised we do not have an DANNI on file. Will postpone several days and try again,.

## 2025-08-08 ENCOUNTER — READMISSION MANAGEMENT (OUTPATIENT)
Dept: CALL CENTER | Facility: HOSPITAL | Age: 85
End: 2025-08-08
Payer: MEDICARE

## 2025-08-11 ENCOUNTER — OFFICE VISIT (OUTPATIENT)
Dept: CARDIOLOGY | Facility: CLINIC | Age: 85
End: 2025-08-11
Payer: MEDICARE

## 2025-08-11 VITALS — SYSTOLIC BLOOD PRESSURE: 118 MMHG | OXYGEN SATURATION: 89 % | HEART RATE: 116 BPM | DIASTOLIC BLOOD PRESSURE: 91 MMHG

## 2025-08-11 DIAGNOSIS — I50.32 CHRONIC HEART FAILURE WITH PRESERVED EJECTION FRACTION (HFPEF): ICD-10-CM

## 2025-08-11 DIAGNOSIS — I10 PRIMARY HYPERTENSION: ICD-10-CM

## 2025-08-11 DIAGNOSIS — I48.19 ATRIAL FIBRILLATION, PERSISTENT: Primary | ICD-10-CM

## 2025-08-11 PROBLEM — R55 POSTURAL DIZZINESS WITH PRESYNCOPE: Status: RESOLVED | Noted: 2025-02-25 | Resolved: 2025-08-11

## 2025-08-11 PROBLEM — R42 POSTURAL DIZZINESS WITH PRESYNCOPE: Status: RESOLVED | Noted: 2025-02-25 | Resolved: 2025-08-11

## 2025-08-11 PROBLEM — R06.09 DYSPNEA ON EXERTION: Status: RESOLVED | Noted: 2025-06-24 | Resolved: 2025-08-11

## 2025-08-11 PROCEDURE — 1159F MED LIST DOCD IN RCRD: CPT | Performed by: NURSE PRACTITIONER

## 2025-08-11 PROCEDURE — 3074F SYST BP LT 130 MM HG: CPT | Performed by: NURSE PRACTITIONER

## 2025-08-11 PROCEDURE — 93000 ELECTROCARDIOGRAM COMPLETE: CPT | Performed by: NURSE PRACTITIONER

## 2025-08-11 PROCEDURE — 1160F RVW MEDS BY RX/DR IN RCRD: CPT | Performed by: NURSE PRACTITIONER

## 2025-08-11 PROCEDURE — 3080F DIAST BP >= 90 MM HG: CPT | Performed by: NURSE PRACTITIONER

## 2025-08-11 PROCEDURE — 99214 OFFICE O/P EST MOD 30 MIN: CPT | Performed by: NURSE PRACTITIONER

## 2025-08-15 ENCOUNTER — TELEPHONE (OUTPATIENT)
Dept: ORTHOPEDIC SURGERY | Facility: CLINIC | Age: 85
End: 2025-08-15
Payer: MEDICARE

## 2025-08-16 LAB
QT INTERVAL: 363 MS
QTC INTERVAL: 476 MS

## 2025-08-28 ENCOUNTER — LAB (OUTPATIENT)
Dept: LAB | Facility: HOSPITAL | Age: 85
End: 2025-08-28
Payer: MEDICARE

## 2025-08-28 DIAGNOSIS — I48.19 ATRIAL FIBRILLATION, PERSISTENT: ICD-10-CM

## 2025-08-28 LAB
DEPRECATED RDW RBC AUTO: 54.7 FL (ref 37–54)
ERYTHROCYTE [DISTWIDTH] IN BLOOD BY AUTOMATED COUNT: 14.3 % (ref 12.3–15.4)
HCT VFR BLD AUTO: 36.7 % (ref 37.5–51)
HGB BLD-MCNC: 12 G/DL (ref 13–17.7)
MCH RBC QN AUTO: 33.8 PG (ref 26.6–33)
MCHC RBC AUTO-ENTMCNC: 32.7 G/DL (ref 31.5–35.7)
MCV RBC AUTO: 103.4 FL (ref 79–97)
PLATELET # BLD AUTO: 154 10*3/MM3 (ref 140–450)
PMV BLD AUTO: 10 FL (ref 6–12)
RBC # BLD AUTO: 3.55 10*6/MM3 (ref 4.14–5.8)
WBC NRBC COR # BLD AUTO: 6.1 10*3/MM3 (ref 3.4–10.8)

## 2025-08-28 PROCEDURE — 36415 COLL VENOUS BLD VENIPUNCTURE: CPT

## 2025-08-28 PROCEDURE — 85027 COMPLETE CBC AUTOMATED: CPT

## 2025-08-29 ENCOUNTER — RESULTS FOLLOW-UP (OUTPATIENT)
Dept: CARDIOLOGY | Facility: CLINIC | Age: 85
End: 2025-08-29
Payer: MEDICARE

## 2025-08-29 DIAGNOSIS — I48.19 ATRIAL FIBRILLATION, PERSISTENT: Primary | ICD-10-CM

## (undated) DEVICE — SNAR POLYP CAPTIFLEX XS/OVL 11X2.4MM 240CM 1P/U

## (undated) DEVICE — DEFENDO AIR WATER SUCTION AND BIOPSY VALVE KIT: Brand: DEFENDO AIR/WATER/SUCTION AND BIOPSY VALVE

## (undated) DEVICE — LINER SURG CANSTR SXN S/RIGD 1500CC

## (undated) DEVICE — THE SINGLE USE ETRAP – POLYP TRAP IS USED FOR SUCTION RETRIEVAL OF ENDOSCOPICALLY REMOVED POLYPS.: Brand: ETRAP

## (undated) DEVICE — SOL IRRG H2O PL/BG 1000ML STRL

## (undated) DEVICE — THE STERILE LIGHT HANDLE COVER IS USED WITH STERIS SURGICAL LIGHTING AND VISUALIZATION SYSTEMS.

## (undated) DEVICE — Device

## (undated) DEVICE — SOLIDIFIER LIQLOC PLS 1500CC BT

## (undated) DEVICE — CONN JET HYDRA H20 AUXILIARY DISP